# Patient Record
Sex: MALE | Race: WHITE | Employment: FULL TIME | URBAN - METROPOLITAN AREA
[De-identification: names, ages, dates, MRNs, and addresses within clinical notes are randomized per-mention and may not be internally consistent; named-entity substitution may affect disease eponyms.]

---

## 2017-03-14 PROBLEM — F43.20 ADJUSTMENT DISORDER: Status: ACTIVE | Noted: 2017-03-14

## 2017-04-08 ENCOUNTER — OFFICE VISIT (OUTPATIENT)
Dept: FAMILY MEDICINE CLINIC | Facility: CLINIC | Age: 57
End: 2017-04-08

## 2017-04-08 VITALS
BODY MASS INDEX: 27.27 KG/M2 | SYSTOLIC BLOOD PRESSURE: 138 MMHG | RESPIRATION RATE: 16 BRPM | HEIGHT: 71 IN | WEIGHT: 194.81 LBS | TEMPERATURE: 98 F | DIASTOLIC BLOOD PRESSURE: 60 MMHG | HEART RATE: 72 BPM

## 2017-04-08 DIAGNOSIS — J01.00 ACUTE MAXILLARY SINUSITIS, RECURRENCE NOT SPECIFIED: Primary | ICD-10-CM

## 2017-04-08 PROCEDURE — 99213 OFFICE O/P EST LOW 20 MIN: CPT | Performed by: PHYSICIAN ASSISTANT

## 2017-04-08 RX ORDER — IPRATROPIUM BROMIDE 21 UG/1
2 SPRAY, METERED NASAL EVERY 12 HOURS
Qty: 1 BOTTLE | Refills: 0 | Status: SHIPPED | OUTPATIENT
Start: 2017-04-08 | End: 2017-05-31

## 2017-04-08 RX ORDER — AMOXICILLIN AND CLAVULANATE POTASSIUM 875; 125 MG/1; MG/1
1 TABLET, FILM COATED ORAL 2 TIMES DAILY
Qty: 20 TABLET | Refills: 0 | Status: SHIPPED | OUTPATIENT
Start: 2017-04-08 | End: 2017-04-18

## 2017-04-08 NOTE — PATIENT INSTRUCTIONS
Thank you for choosing aMnsi Langston PA-C at [place] [address]  To Do: Paty Ahuja  1. Pt to begin medications as prescribed  2. OTC Allegra or Zyrtec  3.  If symptoms persist or increase pt to call office    • Please signup for MY CHART, which i make you healthier and to improve your quality of life.     Referrals, and Radiology Information:    If your insurance requires a referral to a specialist, please allow 5 business days to process your referral request.    If Camila Carr PA-C orders a

## 2017-04-08 NOTE — PROGRESS NOTES
[facility] Internal Medicine Progress Note    CC:  Patient presents with:  Sore Throat: x 3 days - has gotten worse  Sinus Problem  Headache      HPI:   HPI  Sore throat, sinus  Congestion  Sore throat  PND  A little cough  Denies any f/c/n/v/sob Cardiovascular: Negative for chest pain. Gastrointestinal: Negative for nausea and vomiting. Neurological: Negative for dizziness, light-headedness and headaches.          /60 mmHg  Pulse 72  Temp(Src) 98 °F (36.7 °C) (Temporal)  Resp 16  Ht 71\ barriers to learning. Medical education done. Outcome: Patient verbalizes understanding.     Problem List:  Patient Active Problem List:     BELLA on CPAP     GERD (gastroesophageal reflux disease)     Asthma     Nasal septal deviation     Depression     Empt

## 2017-04-27 ENCOUNTER — TELEPHONE (OUTPATIENT)
Dept: FAMILY MEDICINE CLINIC | Facility: CLINIC | Age: 57
End: 2017-04-27

## 2017-04-27 DIAGNOSIS — R05.9 COUGH: Primary | ICD-10-CM

## 2017-04-27 NOTE — TELEPHONE ENCOUNTER
Coughing, nasal congestion, has to constantly clear his throat, no fevers or chills, chest congestion, sore throat, and post nasal drip. Patient states Atrovent did not help. Would like to request a chest XR to rule out pneumonia.  Offered pt an appt, pt de

## 2017-04-28 ENCOUNTER — HOSPITAL ENCOUNTER (OUTPATIENT)
Dept: GENERAL RADIOLOGY | Age: 57
Discharge: HOME | End: 2017-04-28
Attending: INTERNAL MEDICINE
Payer: COMMERCIAL

## 2017-04-28 DIAGNOSIS — R05.9 COUGH: ICD-10-CM

## 2017-04-28 PROCEDURE — 71020 XR CHEST PA + LAT CHEST (CPT=71020): CPT

## 2017-04-28 RX ORDER — LEVOFLOXACIN 500 MG/1
500 TABLET, FILM COATED ORAL DAILY
Qty: 7 TABLET | Refills: 0 | Status: SHIPPED | OUTPATIENT
Start: 2017-04-28 | End: 2017-05-05

## 2017-05-30 RX ORDER — OMEPRAZOLE 20 MG/1
CAPSULE, DELAYED RELEASE ORAL
Qty: 90 CAPSULE | Refills: 3 | Status: SHIPPED | OUTPATIENT
Start: 2017-05-30 | End: 2018-05-25

## 2017-05-30 NOTE — TELEPHONE ENCOUNTER
Requesting Omeprazole 20mg  LOV: 4/8/17  RTC: prn  Last Labs: n/a  Filled: 4/19/16 #90 with 3 refills    Future Appointments  Date Time Provider Trace Carmen   5/31/2017 9:00 AM Pipo STEPHENS PA-C [facility] [address] [facility] [address]   6/13/2017 11:15 AM N[place]

## 2017-05-31 ENCOUNTER — OFFICE VISIT (OUTPATIENT)
Dept: FAMILY MEDICINE CLINIC | Facility: CLINIC | Age: 57
End: 2017-05-31

## 2017-05-31 VITALS
HEART RATE: 86 BPM | DIASTOLIC BLOOD PRESSURE: 76 MMHG | WEIGHT: 192 LBS | SYSTOLIC BLOOD PRESSURE: 130 MMHG | HEIGHT: 71 IN | RESPIRATION RATE: 16 BRPM | TEMPERATURE: 97 F | BODY MASS INDEX: 26.88 KG/M2

## 2017-05-31 DIAGNOSIS — B00.1 COLD SORE: Primary | ICD-10-CM

## 2017-05-31 DIAGNOSIS — H65.01 RIGHT ACUTE SEROUS OTITIS MEDIA, RECURRENCE NOT SPECIFIED: ICD-10-CM

## 2017-05-31 DIAGNOSIS — J45.20 MILD INTERMITTENT ASTHMA WITHOUT COMPLICATION: ICD-10-CM

## 2017-05-31 PROCEDURE — 99214 OFFICE O/P EST MOD 30 MIN: CPT | Performed by: PHYSICIAN ASSISTANT

## 2017-05-31 RX ORDER — VALACYCLOVIR HYDROCHLORIDE 500 MG/1
2000 TABLET, FILM COATED ORAL 2 TIMES DAILY
Qty: 8 TABLET | Refills: 0 | Status: SHIPPED | OUTPATIENT
Start: 2017-05-31 | End: 2017-11-10

## 2017-05-31 RX ORDER — IPRATROPIUM BROMIDE 21 UG/1
2 SPRAY, METERED NASAL EVERY 12 HOURS
Qty: 1 BOTTLE | Refills: 0 | Status: SHIPPED | OUTPATIENT
Start: 2017-05-31 | End: 2017-05-31

## 2017-05-31 RX ORDER — VALACYCLOVIR HYDROCHLORIDE 500 MG/1
2000 TABLET, FILM COATED ORAL 2 TIMES DAILY
Qty: 8 TABLET | Refills: 0 | Status: SHIPPED | OUTPATIENT
Start: 2017-05-31 | End: 2017-05-31

## 2017-05-31 RX ORDER — IPRATROPIUM BROMIDE 21 UG/1
2 SPRAY, METERED NASAL EVERY 12 HOURS
Qty: 1 BOTTLE | Refills: 0 | Status: SHIPPED | OUTPATIENT
Start: 2017-05-31 | End: 2019-01-28

## 2017-05-31 NOTE — PATIENT INSTRUCTIONS
Thank you for choosing Mansi Langston PA-C at [place] [address]  To Do: Paty Ahuja  1. Pt to begin medications as prescribed  2.  If symptoms persist or increase pt to call office    • Please signup for [facility] CHART, which is electronic access to you improve your quality of life.     Referrals, and Radiology Information:    If your insurance requires a referral to a specialist, please allow 5 business days to process your referral request.    If Mansi Langston PA-C orders a CT or MRI, it may take up

## 2017-05-31 NOTE — PROGRESS NOTES
[facility] Internal Medicine Progress Note    CC:  Patient presents with:  Derm Problem: cold sores  Ear Pain: R ear x 2 days - feels clogged      HPI:   HPI  Cold sore  Pt developed a cold sore about 3 days ago  Has been using OTC meds without r congestion, postnasal drip, rhinorrhea, sinus pressure and sore throat. Respiratory: Negative for cough, chest tightness, shortness of breath and wheezing. Cardiovascular: Negative for chest pain. Gastrointestinal: Negative for nausea and vomiting. Refills    ValACYclovir HCl 500 MG Oral Tab 8 tablet 0      Sig: Take 4 tablets (2,000 mg total) by mouth 2 (two) times daily.  X 1 day      Ipratropium Bromide (ATROVENT) 0.03 % Nasal Solution 1 Bottle 0      Si sprays by Nasal route every 12 (twelve)

## 2017-07-03 ENCOUNTER — PATIENT MESSAGE (OUTPATIENT)
Dept: FAMILY MEDICINE CLINIC | Facility: CLINIC | Age: 57
End: 2017-07-03

## 2017-07-03 DIAGNOSIS — Z12.5 ENCOUNTER FOR SCREENING FOR MALIGNANT NEOPLASM OF PROSTATE: Primary | ICD-10-CM

## 2017-07-03 DIAGNOSIS — IMO0001 UNCONTROLLED TYPE 2 DIABETES MELLITUS WITHOUT COMPLICATION, WITHOUT LONG-TERM CURRENT USE OF INSULIN: ICD-10-CM

## 2017-07-05 NOTE — TELEPHONE ENCOUNTER
Patient had general labwork in December. I see a psa was last collected in July 2016. I have pended that order. Do you feel you want any other labs?     Requesting Labs  LOV: 5/31/17  RTC: prn  Last Labs: 12/2016    Future Appointments  Date Time Provider

## 2017-07-05 NOTE — TELEPHONE ENCOUNTER
From: Kwan Jordan  To: Rogers Mccloud MD  Sent: 7/3/2017 11:15 AM CDT  Subject: Non-Urgent Medical Question    I will be going for blood work before July 18th for Dr. Ken Hidalgo and I was requesting for any needed blood work from Dr. Jluis England?   Kwan Jordan  (8

## 2017-07-17 ENCOUNTER — LAB ENCOUNTER (OUTPATIENT)
Dept: LAB | Age: 57
End: 2017-07-17
Attending: INTERNAL MEDICINE
Payer: COMMERCIAL

## 2017-07-17 DIAGNOSIS — E23.0 HYPOGONADOTROPIC HYPOGONADISM (HCC): ICD-10-CM

## 2017-07-17 DIAGNOSIS — E11.65 UNCONTROLLED TYPE 2 DIABETES MELLITUS WITH HYPERGLYCEMIA, WITHOUT LONG-TERM CURRENT USE OF INSULIN (HCC): ICD-10-CM

## 2017-07-17 DIAGNOSIS — E23.6 EMPTY SELLA SYNDROME (HCC): ICD-10-CM

## 2017-07-17 DIAGNOSIS — Z12.5 ENCOUNTER FOR SCREENING FOR MALIGNANT NEOPLASM OF PROSTATE: ICD-10-CM

## 2017-07-17 DIAGNOSIS — E78.2 MIXED HYPERLIPIDEMIA: ICD-10-CM

## 2017-07-17 DIAGNOSIS — IMO0001 UNCONTROLLED TYPE 2 DIABETES MELLITUS WITHOUT COMPLICATION, WITHOUT LONG-TERM CURRENT USE OF INSULIN: ICD-10-CM

## 2017-07-17 DIAGNOSIS — E03.9 ACQUIRED HYPOTHYROIDISM: ICD-10-CM

## 2017-07-17 LAB
COMPLEXED PSA SERPL-MCNC: 0.88 NG/ML (ref 0.01–4)
CREAT UR-SCNC: 74.7 MG/DL
EST. AVERAGE GLUCOSE BLD GHB EST-MCNC: 157 MG/DL (ref 68–126)
FREE T4: 0.8 NG/DL (ref 0.9–1.8)
HBA1C MFR BLD HPLC: 7.1 % (ref ?–5.7)
MICROALBUMIN UR-MCNC: <0.5 MG/DL

## 2017-07-17 PROCEDURE — 82043 UR ALBUMIN QUANTITATIVE: CPT

## 2017-07-17 PROCEDURE — 83036 HEMOGLOBIN GLYCOSYLATED A1C: CPT

## 2017-07-17 PROCEDURE — 84439 ASSAY OF FREE THYROXINE: CPT

## 2017-07-17 PROCEDURE — 82570 ASSAY OF URINE CREATININE: CPT

## 2017-07-17 PROCEDURE — 36415 COLL VENOUS BLD VENIPUNCTURE: CPT

## 2017-10-01 ENCOUNTER — MED REC SCAN ONLY (OUTPATIENT)
Dept: FAMILY MEDICINE CLINIC | Facility: CLINIC | Age: 57
End: 2017-10-01

## 2017-10-17 ENCOUNTER — IMMUNIZATION (OUTPATIENT)
Dept: FAMILY MEDICINE CLINIC | Facility: CLINIC | Age: 57
End: 2017-10-17

## 2017-10-17 DIAGNOSIS — Z23 NEED FOR VACCINATION: ICD-10-CM

## 2017-10-17 PROCEDURE — 90686 IIV4 VACC NO PRSV 0.5 ML IM: CPT | Performed by: INTERNAL MEDICINE

## 2017-10-17 PROCEDURE — 90471 IMMUNIZATION ADMIN: CPT | Performed by: INTERNAL MEDICINE

## 2017-11-10 ENCOUNTER — OFFICE VISIT (OUTPATIENT)
Dept: FAMILY MEDICINE CLINIC | Facility: CLINIC | Age: 57
End: 2017-11-10

## 2017-11-10 ENCOUNTER — TELEPHONE (OUTPATIENT)
Dept: FAMILY MEDICINE CLINIC | Facility: CLINIC | Age: 57
End: 2017-11-10

## 2017-11-10 ENCOUNTER — HOSPITAL ENCOUNTER (OUTPATIENT)
Dept: GENERAL RADIOLOGY | Age: 57
Discharge: HOME | End: 2017-11-10
Attending: FAMILY MEDICINE
Payer: COMMERCIAL

## 2017-11-10 VITALS
RESPIRATION RATE: 16 BRPM | WEIGHT: 199 LBS | HEART RATE: 78 BPM | DIASTOLIC BLOOD PRESSURE: 70 MMHG | SYSTOLIC BLOOD PRESSURE: 142 MMHG | OXYGEN SATURATION: 99 % | HEIGHT: 71 IN | BODY MASS INDEX: 27.86 KG/M2

## 2017-11-10 DIAGNOSIS — R06.02 SHORTNESS OF BREATH: Primary | ICD-10-CM

## 2017-11-10 DIAGNOSIS — R06.02 SHORTNESS OF BREATH: ICD-10-CM

## 2017-11-10 DIAGNOSIS — R03.0 ELEVATED BLOOD PRESSURE READING: ICD-10-CM

## 2017-11-10 PROCEDURE — 99214 OFFICE O/P EST MOD 30 MIN: CPT | Performed by: FAMILY MEDICINE

## 2017-11-10 PROCEDURE — 71020 XR CHEST PA + LAT CHEST (CPT=71020): CPT | Performed by: FAMILY MEDICINE

## 2017-11-10 NOTE — PROGRESS NOTES
HPI:   Jada Wong is a 65year old male that presents for SOB for 2 days. , exposed 2 days ago to ceiling debris at work. Very yvette, possible asbestos. Notes trouble taking deep breaths since then. No cough, wheezing.   Hx of asthma, us breath  - hx of asthma and possible exposure at work, will get CXR and start trial of flovent for SOB. Continue albuterol PRN. - CXR negative  - Fluticasone Propionate  MCG/ACT Inhalation Aerosol; Inhale 2 puffs into the lungs 2 (two) times daily.

## 2017-11-10 NOTE — PATIENT INSTRUCTIONS
Flovent 2 puffs twice a day for one month  Albuterol 2 puffs every 4-6 hours as needed  Chest xray today  Follow up if not improving or go to ER if shortness of breath worsens     Check blood pressure daily at home and bring log to next visit.   Goal <140/9 on the amount of caffeine and stimulants you consume. Follow-up care  Follow up with your healthcare provider, or as advised. If tests were done, you will be told if your treatment needs to be changed. You can call as directed for the results.   If an X-r

## 2017-11-10 NOTE — TELEPHONE ENCOUNTER
Patint states he was exposed to debris from the ceiling at work on 11/7/17, 11/8/17 and 11/9/17. Patient states after the exposure on 11/8/17 he started to experience heaviness in his lungs.  He states he has hx of asthma but this symptom feels much differe

## 2017-11-10 NOTE — TELEPHONE ENCOUNTER
Dr. Wilber Hermosillo- Pt working with dust at work and has Asthma. Pt would like to discuss the dust and cement dust that he has breathed in. Asthma is acting up. Please call. We can offer an appointment next week.   Pt wants to make sure he can wait until next week

## 2017-11-10 NOTE — TELEPHONE ENCOUNTER
Urgent messaged  Paged from patient  He has asbestos exposure at work and c/o difficul breathing  And asthma  He wants us to call    Please call him and ask him how bad is it, i'm sure he can see one of us today

## 2017-11-13 RX ORDER — ESCITALOPRAM OXALATE 10 MG/1
TABLET ORAL
Qty: 90 TABLET | Refills: 1 | Status: SHIPPED | OUTPATIENT
Start: 2017-11-13 | End: 2018-05-12

## 2017-11-13 NOTE — TELEPHONE ENCOUNTER
Requesting Escitalopram  LOV: 5/31/17  RTC: prn  Last Relevant Labs: n/a  Filled: 11/16/16 #90 with 3 refills  Time started: 109    Time ended: 113    Total time spent on chart: 4 min      Future Appointments  Date Time Provider Trace Carmen   11/27/2

## 2017-11-27 ENCOUNTER — OFFICE VISIT (OUTPATIENT)
Dept: FAMILY MEDICINE CLINIC | Facility: CLINIC | Age: 57
End: 2017-11-27

## 2017-11-27 VITALS
BODY MASS INDEX: 27.58 KG/M2 | SYSTOLIC BLOOD PRESSURE: 140 MMHG | HEART RATE: 70 BPM | HEIGHT: 71 IN | RESPIRATION RATE: 16 BRPM | WEIGHT: 197 LBS | TEMPERATURE: 99 F | DIASTOLIC BLOOD PRESSURE: 74 MMHG

## 2017-11-27 DIAGNOSIS — I10 BENIGN ESSENTIAL HTN: ICD-10-CM

## 2017-11-27 DIAGNOSIS — T46.6X5A STATIN MYOPATHY: ICD-10-CM

## 2017-11-27 DIAGNOSIS — G43.709 CHRONIC MIGRAINE WITHOUT AURA WITHOUT STATUS MIGRAINOSUS, NOT INTRACTABLE: ICD-10-CM

## 2017-11-27 DIAGNOSIS — E78.5 DYSLIPIDEMIA: ICD-10-CM

## 2017-11-27 DIAGNOSIS — E11.65 UNCONTROLLED TYPE 2 DIABETES MELLITUS WITH HYPERGLYCEMIA, WITHOUT LONG-TERM CURRENT USE OF INSULIN (HCC): Primary | ICD-10-CM

## 2017-11-27 DIAGNOSIS — G72.0 STATIN MYOPATHY: ICD-10-CM

## 2017-11-27 PROCEDURE — 99214 OFFICE O/P EST MOD 30 MIN: CPT | Performed by: INTERNAL MEDICINE

## 2017-11-27 RX ORDER — LISINOPRIL 10 MG/1
10 TABLET ORAL DAILY
Qty: 30 TABLET | Refills: 3 | Status: SHIPPED | OUTPATIENT
Start: 2017-11-27 | End: 2018-01-22

## 2017-11-27 NOTE — PATIENT INSTRUCTIONS
Thank you for choosing Alan Crawley MD at [place] [address]  To Do: Paty Ahuaj  1.  Stop simvastatin for 2 weeks and see if your body aches go away it means your simvastatin is causing aches- and then call us back we can change it to something else exercise may trigger headache    Drink Plenty of Water • A normal adult should drink plenty of water throughout the day   • Dehydration may cause headaches    Limit Caffeine, Alcohol and other Drugs • Caffeine is a stimulant and caffeine withdrawal may cau lab is closed daily from 12-12:30  Saturday lab hours would be based upon their providers schedules at that office. Patients should call the lab for appointments and hours details.    The Lab phone is the same: [phone]  • Please signup for MY CHART, w to a specialist, please allow 5 business days to process your referral request.    If Kianna Spears MD orders a CT or MRI, it may take up to 10 business days to receive approval from your insurance company.  Once our office has called informing you that the

## 2017-11-27 NOTE — PROGRESS NOTES
[facility] Internal Medicine Office Note  Chief Complaint:   Patient presents with:  Headache  Muscle Pain  HTN  Cholesterol  Diabetes      HPI:   This is a 65year old male coming in for  HPI  Pt with dm and uncontrolled htn  Seeing dorita  C/o bran Disp: 1 kit Rfl: 0   VENTOLIN  (90 BASE) MCG/ACT Inhalation Aero Soln INHALE 1 PUFF INTO THE LUNGS EVERY 6 HOURS AS NEEDED FOR WHEEZING Disp: 18 g Rfl: 0   SIMVASTATIN 40 MG Oral Tab TAKE ONE AND ONE-HALF TABLETS DAILY Disp: 135 tablet Rfl: 1   Aspi has no cervical adenopathy. Neurological: He is alert and oriented to person, place, and time. He displays normal reflexes. No cranial nerve deficit. Coordination and gait normal.   Skin: Skin is warm. No lesion and no rash noted. No erythema.    Psychiat verbalizes understanding. Patient is notified to call with any questions, complications, allergies, or worsening or changing symptoms. Patient is to call with any side effects or complications from the treatments as a result of today.    Patient Active Prob

## 2017-12-13 ENCOUNTER — TELEPHONE (OUTPATIENT)
Dept: FAMILY MEDICINE CLINIC | Facility: CLINIC | Age: 57
End: 2017-12-13

## 2017-12-13 DIAGNOSIS — G47.30 SLEEP APNEA, UNSPECIFIED TYPE: Primary | ICD-10-CM

## 2017-12-13 NOTE — TELEPHONE ENCOUNTER
Contacted pt and asked where he is going to obtain the mouth piece from. Per pt he will be going to specialized dental location that creates dental impressions and than makes the mouth piece, pt states he will call with information.  Will await information

## 2017-12-13 NOTE — TELEPHONE ENCOUNTER
Sleep study and office note from 4/20/15 faxed to [facility] @ [phone], ATTN: Soraida Osman.

## 2018-01-22 ENCOUNTER — OFFICE VISIT (OUTPATIENT)
Dept: FAMILY MEDICINE CLINIC | Facility: CLINIC | Age: 58
End: 2018-01-22

## 2018-01-22 VITALS
DIASTOLIC BLOOD PRESSURE: 72 MMHG | HEIGHT: 70 IN | OXYGEN SATURATION: 96 % | WEIGHT: 196 LBS | RESPIRATION RATE: 15 BRPM | HEART RATE: 103 BPM | BODY MASS INDEX: 28.06 KG/M2 | TEMPERATURE: 98 F | SYSTOLIC BLOOD PRESSURE: 138 MMHG

## 2018-01-22 DIAGNOSIS — G47.33 OSA ON CPAP: ICD-10-CM

## 2018-01-22 DIAGNOSIS — E11.65 UNCONTROLLED TYPE 2 DIABETES MELLITUS WITH HYPERGLYCEMIA, WITHOUT LONG-TERM CURRENT USE OF INSULIN (HCC): Primary | ICD-10-CM

## 2018-01-22 DIAGNOSIS — Z99.89 OSA ON CPAP: ICD-10-CM

## 2018-01-22 DIAGNOSIS — Z13.21 SCREENING FOR ENDOCRINE, NUTRITIONAL, METABOLIC AND IMMUNITY DISORDER: ICD-10-CM

## 2018-01-22 DIAGNOSIS — Z13.29 SCREENING FOR ENDOCRINE, NUTRITIONAL, METABOLIC AND IMMUNITY DISORDER: ICD-10-CM

## 2018-01-22 DIAGNOSIS — Z13.0 SCREENING FOR ENDOCRINE, NUTRITIONAL, METABOLIC AND IMMUNITY DISORDER: ICD-10-CM

## 2018-01-22 DIAGNOSIS — Z13.21 ENCOUNTER FOR VITAMIN DEFICIENCY SCREENING: ICD-10-CM

## 2018-01-22 DIAGNOSIS — Z13.228 SCREENING FOR ENDOCRINE, NUTRITIONAL, METABOLIC AND IMMUNITY DISORDER: ICD-10-CM

## 2018-01-22 DIAGNOSIS — I10 ESSENTIAL HYPERTENSION: ICD-10-CM

## 2018-01-22 DIAGNOSIS — E78.2 MIXED HYPERLIPIDEMIA: ICD-10-CM

## 2018-01-22 PROCEDURE — 99396 PREV VISIT EST AGE 40-64: CPT | Performed by: FAMILY MEDICINE

## 2018-01-22 RX ORDER — LOSARTAN POTASSIUM 50 MG/1
50 TABLET ORAL DAILY
Qty: 30 TABLET | Refills: 1 | Status: SHIPPED | OUTPATIENT
Start: 2018-01-22 | End: 2018-03-07

## 2018-01-22 NOTE — PROGRESS NOTES
Patient presents with:  Physical: NP, physical       Joseeta Cabot is a 65year old year old who presents for recheck of diabetes. Pt has been checking feet on a regular basis. Pt denies any tingling of the feet. Pt denies any sx's of depression.   Pt co Retinopathy - - No - - - No -   Comments - - - - - - - -   Last Depression Screening Date - - - - - - - -   How was patient screened? - - - - - - - -   PHQ2 Date - - 1/15/2018 - - - - -   PHQ2 Score - - 0 - - - - -   PHQ4 Score - - - - - - - -       Boby Cholesterol (mg/dL)   Date Value   12/17/2016 67   07/08/2016 131 (H)   04/08/2016 78   ----------  AST (U/L)   Date Value   12/17/2016 13 (L)   07/08/2016 20   09/14/2015 15   07/08/2014 24   01/13/2014 28   01/16/2013 18   06/22/2011 14 (L)   ---------- Problem List:     BELLA on CPAP     GERD (gastroesophageal reflux disease)     Asthma     Nasal septal deviation     Depression     Empty sella (HCC)     Diverticula of colon     Internal hemorrhoids     Schatzki's ring     Allergic rhinitis     Gastritis Carotid stenosis 7/15/2016   • Depression 6/20/11   • Diabetes mellitus, type 2 ([facility] [address]    • Diastolic dysfunction 2/97/9084   • Diverticula of colon 9/26/11    of sigmoid colon   • Empty sella ([facility] 7/1/2011    a 1.2 cm mass is identified within th pituitar no JVD present. No edema present. No mass and no thyromegaly present. Cardiovascular: Normal rate, regular rhythm and intact distal pulses. No murmur, rubs or gallops. Pulmonary/Chest: Effort normal and breath sounds normal. No respiratory distress.  Allen Sigala Chronic Complications, Behavior Change Strategies, Risk Reduction Strategies   Reducing Risk: Daily foot checks, BP Control, Lipid Control, Dilated eye exam, Skin/dental care, Urine for microalbumin, Weight Control  Medication: Take at appropriate times, T

## 2018-01-24 ENCOUNTER — LAB ENCOUNTER (OUTPATIENT)
Dept: LAB | Age: 58
End: 2018-01-24
Attending: FAMILY MEDICINE
Payer: COMMERCIAL

## 2018-01-24 DIAGNOSIS — Z13.21 SCREENING FOR ENDOCRINE, NUTRITIONAL, METABOLIC AND IMMUNITY DISORDER: ICD-10-CM

## 2018-01-24 DIAGNOSIS — E11.65 UNCONTROLLED TYPE 2 DIABETES MELLITUS WITH HYPERGLYCEMIA, WITHOUT LONG-TERM CURRENT USE OF INSULIN (HCC): ICD-10-CM

## 2018-01-24 DIAGNOSIS — E23.0 HYPOGONADOTROPIC HYPOGONADISM (HCC): ICD-10-CM

## 2018-01-24 DIAGNOSIS — Z13.29 SCREENING FOR ENDOCRINE, NUTRITIONAL, METABOLIC AND IMMUNITY DISORDER: ICD-10-CM

## 2018-01-24 DIAGNOSIS — E23.6 EMPTY SELLA (HCC): ICD-10-CM

## 2018-01-24 DIAGNOSIS — Z13.228 SCREENING FOR ENDOCRINE, NUTRITIONAL, METABOLIC AND IMMUNITY DISORDER: ICD-10-CM

## 2018-01-24 DIAGNOSIS — E78.2 MIXED HYPERLIPIDEMIA: ICD-10-CM

## 2018-01-24 DIAGNOSIS — Z13.0 SCREENING FOR ENDOCRINE, NUTRITIONAL, METABOLIC AND IMMUNITY DISORDER: ICD-10-CM

## 2018-01-24 DIAGNOSIS — E03.9 ACQUIRED HYPOTHYROIDISM: ICD-10-CM

## 2018-01-24 DIAGNOSIS — Z13.21 ENCOUNTER FOR VITAMIN DEFICIENCY SCREENING: ICD-10-CM

## 2018-01-24 LAB
25-HYDROXYVITAMIN D (TOTAL): 18.5 NG/ML (ref 30–100)
ALBUMIN SERPL-MCNC: 4.3 G/DL (ref 3.5–4.8)
ALP LIVER SERPL-CCNC: 81 U/L (ref 45–117)
ALT SERPL-CCNC: 30 U/L (ref 17–63)
AST SERPL-CCNC: 17 U/L (ref 15–41)
BASOPHILS # BLD AUTO: 0.03 X10(3) UL (ref 0–0.1)
BASOPHILS NFR BLD AUTO: 0.6 %
BILIRUB SERPL-MCNC: 0.8 MG/DL (ref 0.1–2)
BUN BLD-MCNC: 14 MG/DL (ref 8–20)
CALCIUM BLD-MCNC: 9.1 MG/DL (ref 8.3–10.3)
CHLORIDE: 102 MMOL/L (ref 101–111)
CHOLEST SMN-MCNC: 161 MG/DL (ref ?–200)
CO2: 29 MMOL/L (ref 22–32)
CREAT BLD-MCNC: 0.89 MG/DL (ref 0.7–1.3)
CREAT UR-SCNC: 108 MG/DL
EOSINOPHIL # BLD AUTO: 0.12 X10(3) UL (ref 0–0.3)
EOSINOPHIL NFR BLD AUTO: 2.5 %
ERYTHROCYTE [DISTWIDTH] IN BLOOD BY AUTOMATED COUNT: 13.7 % (ref 11.5–16)
EST. AVERAGE GLUCOSE BLD GHB EST-MCNC: 174 MG/DL (ref 68–126)
FREE T4: 0.7 NG/DL (ref 0.9–1.8)
GLUCOSE BLD-MCNC: 131 MG/DL (ref 70–99)
HBA1C MFR BLD HPLC: 7.7 % (ref ?–5.7)
HCT VFR BLD AUTO: 46.3 % (ref 37–53)
HDLC SERPL-MCNC: 51 MG/DL (ref 45–?)
HDLC SERPL: 3.16 {RATIO} (ref ?–4.97)
HGB BLD-MCNC: 15.2 G/DL (ref 13–17)
IMMATURE GRANULOCYTE COUNT: 0.04 X10(3) UL (ref 0–1)
IMMATURE GRANULOCYTE RATIO %: 0.8 %
LDLC SERPL CALC-MCNC: 89 MG/DL (ref ?–130)
LYMPHOCYTES # BLD AUTO: 1.37 X10(3) UL (ref 0.9–4)
LYMPHOCYTES NFR BLD AUTO: 28.3 %
M PROTEIN MFR SERPL ELPH: 7.7 G/DL (ref 6.1–8.3)
MCH RBC QN AUTO: 27.9 PG (ref 27–33.2)
MCHC RBC AUTO-ENTMCNC: 32.8 G/DL (ref 31–37)
MCV RBC AUTO: 85.1 FL (ref 80–99)
MICROALBUMIN UR-MCNC: 0.77 MG/DL
MICROALBUMIN/CREAT 24H UR-RTO: 7.1 UG/MG (ref ?–30)
MONOCYTES # BLD AUTO: 0.35 X10(3) UL (ref 0.1–0.6)
MONOCYTES NFR BLD AUTO: 7.2 %
NEUTROPHIL ABS PRELIM: 2.93 X10 (3) UL (ref 1.3–6.7)
NEUTROPHILS # BLD AUTO: 2.93 X10(3) UL (ref 1.3–6.7)
NEUTROPHILS NFR BLD AUTO: 60.6 %
NONHDLC SERPL-MCNC: 110 MG/DL (ref ?–130)
PLATELET # BLD AUTO: 179 10(3)UL (ref 150–450)
POTASSIUM SERPL-SCNC: 4.2 MMOL/L (ref 3.6–5.1)
RBC # BLD AUTO: 5.44 X10(6)UL (ref 4.3–5.7)
RED CELL DISTRIBUTION WIDTH-SD: 42.2 FL (ref 35.1–46.3)
SODIUM SERPL-SCNC: 138 MMOL/L (ref 136–144)
TRIGL SERPL-MCNC: 103 MG/DL (ref ?–150)
TSI SER-ACNC: 2.52 MIU/ML (ref 0.35–5.5)
VLDLC SERPL CALC-MCNC: 21 MG/DL (ref 5–40)
WBC # BLD AUTO: 4.8 X10(3) UL (ref 4–13)

## 2018-01-24 PROCEDURE — 80061 LIPID PANEL: CPT | Performed by: FAMILY MEDICINE

## 2018-01-24 PROCEDURE — 82306 VITAMIN D 25 HYDROXY: CPT | Performed by: FAMILY MEDICINE

## 2018-01-24 PROCEDURE — 36415 COLL VENOUS BLD VENIPUNCTURE: CPT | Performed by: FAMILY MEDICINE

## 2018-01-24 PROCEDURE — 85025 COMPLETE CBC W/AUTO DIFF WBC: CPT | Performed by: FAMILY MEDICINE

## 2018-02-06 ENCOUNTER — TELEPHONE (OUTPATIENT)
Dept: FAMILY MEDICINE CLINIC | Facility: CLINIC | Age: 58
End: 2018-02-06

## 2018-02-06 NOTE — TELEPHONE ENCOUNTER
Please see below message. Patient is requesting to try an alternative cholesterol medication d/t muscle aches from simvastatin. Please advise. Thank you!

## 2018-02-06 NOTE — TELEPHONE ENCOUNTER
Patient stopped taking cholesaterol medication and the body aches that he was having went away which this is what Dr. Amol Posadas told him to do. He would like to try a different medication.  Patient had been taking SIMVASTATIN 40 MG Oral Tab however he is only

## 2018-02-07 RX ORDER — ATORVASTATIN CALCIUM 40 MG/1
40 TABLET, FILM COATED ORAL NIGHTLY
Qty: 90 TABLET | Refills: 1 | Status: SHIPPED | OUTPATIENT
Start: 2018-02-07 | End: 2018-06-12

## 2018-02-07 NOTE — TELEPHONE ENCOUNTER
Called patient and spoke with him. Went over [address] below. Patient states understanding. Rx sent to pharmacy for patient as requested.

## 2018-03-07 DIAGNOSIS — I10 ESSENTIAL HYPERTENSION: ICD-10-CM

## 2018-03-07 RX ORDER — LOSARTAN POTASSIUM 50 MG/1
TABLET ORAL
Qty: 30 TABLET | Refills: 0 | Status: SHIPPED | OUTPATIENT
Start: 2018-03-07 | End: 2018-04-06

## 2018-04-06 DIAGNOSIS — I10 ESSENTIAL HYPERTENSION: ICD-10-CM

## 2018-04-06 RX ORDER — LOSARTAN POTASSIUM 50 MG/1
TABLET ORAL
Qty: 30 TABLET | Refills: 0 | Status: SHIPPED | OUTPATIENT
Start: 2018-04-06 | End: 2018-05-10

## 2018-04-17 ENCOUNTER — TELEPHONE (OUTPATIENT)
Dept: FAMILY MEDICINE CLINIC | Facility: CLINIC | Age: 58
End: 2018-04-17

## 2018-04-17 ENCOUNTER — OFFICE VISIT (OUTPATIENT)
Dept: FAMILY MEDICINE CLINIC | Facility: CLINIC | Age: 58
End: 2018-04-17

## 2018-04-17 VITALS
DIASTOLIC BLOOD PRESSURE: 62 MMHG | HEIGHT: 71 IN | WEIGHT: 200.38 LBS | SYSTOLIC BLOOD PRESSURE: 112 MMHG | BODY MASS INDEX: 28.05 KG/M2 | HEART RATE: 84 BPM | RESPIRATION RATE: 16 BRPM | TEMPERATURE: 98 F

## 2018-04-17 DIAGNOSIS — M54.50 ACUTE MIDLINE LOW BACK PAIN WITHOUT SCIATICA: Primary | ICD-10-CM

## 2018-04-17 DIAGNOSIS — M62.838 MUSCLE SPASM: ICD-10-CM

## 2018-04-17 PROCEDURE — 99213 OFFICE O/P EST LOW 20 MIN: CPT | Performed by: FAMILY MEDICINE

## 2018-04-17 RX ORDER — CYCLOBENZAPRINE HCL 10 MG
10 TABLET ORAL NIGHTLY PRN
Qty: 30 TABLET | Refills: 0 | Status: SHIPPED | OUTPATIENT
Start: 2018-04-17 | End: 2018-06-04 | Stop reason: ALTCHOICE

## 2018-04-17 NOTE — PATIENT INSTRUCTIONS
Alternate Tylenol and ibuprofen for pain and inflammation  Can add cyclobenzaprine muscle relaxer at night for up to 3 times per day. This medication can be sedating and do not combine with alcohol.   Follow-up if not improving    Self-Care for Low Back · You have a sharp, stabbing pain. · Your pain is constant. · You have pain or numbness in your leg. · You feel pain in a new area of your back. · You notice that the pain isn’t decreasing after more than a week.    Date Last Reviewed: 9/29/2015 © 2000

## 2018-04-17 NOTE — TELEPHONE ENCOUNTER
Patient scheduled an appointment and has since been seen by a different [facility] office. PCP has been changed. My Chart message sent to patient.

## 2018-04-17 NOTE — TELEPHONE ENCOUNTER
Patient called the office during the lunch hour and left a message. He was at the vet and lifting his dog onto the exam table and pulled a muscle in his back. He is now not able to move.  He has never had back issues in the past. He is requesting we send a

## 2018-04-17 NOTE — PROGRESS NOTES
HPI:   Kassie eJnkins is a 63year old male that presents for low back pain. He lifted his 80 pound dog up onto the Vet table 3 days ago and since then has had midline dull aching low back pain. Pain is 6 out of 10 at worst and does not radiate.   Ibupro DAILY, Disp: 90 tablet, Rfl: 1  •  Ipratropium Bromide (ATROVENT) 0.03 % Nasal Solution, 2 sprays by Nasal route every 12 (twelve) hours. , Disp: 1 Bottle, Rfl: 0  •  OMEPRAZOLE 20 MG Oral Capsule Delayed Release, TAKE 1 CAPSULE DAILY, Disp: 90 capsule, Rfl repair. Strength in right lower extremity normal.      ASSESSMENT AND PLAN:      1. Acute midline low back pain without sciatica, Muscle spasm  -Alternate Tylenol and ibuprofen for pain and inflammation.   Add muscle relaxer at night.  -Heating pad as need

## 2018-05-10 DIAGNOSIS — I10 ESSENTIAL HYPERTENSION: ICD-10-CM

## 2018-05-10 RX ORDER — LOSARTAN POTASSIUM 50 MG/1
TABLET ORAL
Qty: 30 TABLET | Refills: 0 | Status: SHIPPED | OUTPATIENT
Start: 2018-05-10 | End: 2018-06-12

## 2018-05-14 RX ORDER — ESCITALOPRAM OXALATE 10 MG/1
TABLET ORAL
Qty: 90 TABLET | Refills: 0 | Status: SHIPPED | OUTPATIENT
Start: 2018-05-14 | End: 2018-08-12

## 2018-05-14 NOTE — TELEPHONE ENCOUNTER
Requesting Escitalopram   LOV: 4/17/18  RTC: none   Last Relevant Labs: n/a   Filled: 11/13/17 #90 with 1 refills    Future Appointments  Date Time Provider Trace Carmen   7/16/2018 9:00 AM MD JOANNE Potter [facility]       Pended if okay

## 2018-05-14 NOTE — TELEPHONE ENCOUNTER
Attempted to reach pt no answer, left detailed message informing pt that he is due for his annual physical. Left office information to contact the office and schedule OV

## 2018-05-25 RX ORDER — OMEPRAZOLE 20 MG/1
CAPSULE, DELAYED RELEASE ORAL
Qty: 90 CAPSULE | Refills: 3 | Status: SHIPPED | OUTPATIENT
Start: 2018-05-25 | End: 2019-05-20

## 2018-05-25 NOTE — TELEPHONE ENCOUNTER
Requesting: Omeprazole 20mg  LOV: 4/17/18 - acute  RTC: if no improvement  Last Relevant Labs: 1/24/18 - from Dr. Chung Littlefield: 5/30/17 #90 with 3 refills    Future Appointments  Date Time Provider Trace Caremn   6/4/2018 9:00 AM Tao Moore

## 2018-06-04 ENCOUNTER — OFFICE VISIT (OUTPATIENT)
Dept: FAMILY MEDICINE CLINIC | Facility: CLINIC | Age: 58
End: 2018-06-04

## 2018-06-04 VITALS
RESPIRATION RATE: 16 BRPM | DIASTOLIC BLOOD PRESSURE: 70 MMHG | SYSTOLIC BLOOD PRESSURE: 120 MMHG | WEIGHT: 194.25 LBS | HEIGHT: 71 IN | HEART RATE: 72 BPM | BODY MASS INDEX: 27.19 KG/M2 | TEMPERATURE: 97 F

## 2018-06-04 DIAGNOSIS — E23.6 EMPTY SELLA SYNDROME (HCC): ICD-10-CM

## 2018-06-04 DIAGNOSIS — E78.2 MIXED HYPERLIPIDEMIA: ICD-10-CM

## 2018-06-04 DIAGNOSIS — F32.A DEPRESSION, UNSPECIFIED DEPRESSION TYPE: ICD-10-CM

## 2018-06-04 DIAGNOSIS — E03.8 SUBCLINICAL HYPOTHYROIDISM: ICD-10-CM

## 2018-06-04 DIAGNOSIS — E11.65 UNCONTROLLED TYPE 2 DIABETES MELLITUS WITH HYPERGLYCEMIA, WITHOUT LONG-TERM CURRENT USE OF INSULIN (HCC): Primary | ICD-10-CM

## 2018-06-04 DIAGNOSIS — K21.9 GASTROESOPHAGEAL REFLUX DISEASE WITHOUT ESOPHAGITIS: ICD-10-CM

## 2018-06-04 DIAGNOSIS — J45.20 MILD INTERMITTENT ASTHMA WITHOUT COMPLICATION: ICD-10-CM

## 2018-06-04 DIAGNOSIS — Z99.89 OSA ON CPAP: ICD-10-CM

## 2018-06-04 DIAGNOSIS — G47.33 OSA ON CPAP: ICD-10-CM

## 2018-06-04 PROCEDURE — 99214 OFFICE O/P EST MOD 30 MIN: CPT | Performed by: FAMILY MEDICINE

## 2018-06-04 RX ORDER — CANAGLIFLOZIN 100 MG/1
1 TABLET, FILM COATED ORAL DAILY
COMMUNITY
Start: 2018-04-25 | End: 2018-07-24

## 2018-06-04 NOTE — PATIENT INSTRUCTIONS
Pneumonia 23 and Shingles vaccine available at pharmacy. Please have them send a record if you receive them. Follow up in 6 months or sooner if needed.        Prevention Guidelines, Men Ages 48 to 59  Screening tests and vaccines are an important part of Obesity All men in this age group At routine exams   Prostate cancer Starting at age 39, talk to healthcare provider about risks and benefits of digital rectal exam (CAL) and prostate-specific antigen (PSA) screening1 At routine exams   Syphilis Men at inc PPSV23: 1 to 2 doses through age 59, or 1 dose at 72 or older (protects against 23 types of pneumococcal bacteria)      Tetanus/diphtheria/  pertussis (Td/Tdap) booster All men in this age group Td every 10 years, or a one-time dose of Tdap instead of a Td

## 2018-06-04 NOTE — PROGRESS NOTES
Jeff Saldana is a 63year old male that presents for follow up. History of BELLA, wears CPAP regularly. Asthma is well controlled with albuterol inhaler use 1-2 times per year, usually triggered by allergies.   Symptoms have been significantly reduc (ACCU-CHEK TERE CONNECT) W/DEVICE Does not apply Kit, 1 each by Does not apply route 4 (four) times daily. , Disp: 1 kit, Rfl: 0  •  VENTOLIN  (90 BASE) MCG/ACT Inhalation Aero Soln, INHALE 1 PUFF INTO THE LUNGS EVERY 6 HOURS AS NEEDED FOR WHEEZING, amelia's ring     Family History   Problem Relation Age of Onset   • Other (colon cancer) Other    • Diabetes Mother    • Other (COPD) Mother    • Prostate Cancer Neg      Social History Narrative  He is  and works at [employer]. He is .   He AND PLAN:   63year old year old male who presents for a complete physical exam.     1. Uncontrolled type 2 diabetes mellitus with hyperglycemia, without long-term current use of insulin (HCC)  A1c trending up.   Last A1c 7.7, repeat in 3 months  Patient on

## 2018-06-11 ENCOUNTER — TELEPHONE (OUTPATIENT)
Dept: FAMILY MEDICINE CLINIC | Facility: CLINIC | Age: 58
End: 2018-06-11

## 2018-06-11 DIAGNOSIS — I10 ESSENTIAL HYPERTENSION: ICD-10-CM

## 2018-06-11 NOTE — TELEPHONE ENCOUNTER
Pt states Dr. David Constantino is going to call in Two rx's for pt.    atorvastatin (LIPITOR) 40 MG Oral Tab 90 tablet 1 2/7/2018     Sig - Route: Take 1 tablet (40 mg total) by mouth nightly.  - Oral    E-Prescribing Status: Receipt confirmed by pharmacy (2/7/2018

## 2018-06-11 NOTE — TELEPHONE ENCOUNTER
S/W patient he called 's office for medication refill. He will be seeing . Awake/Alert and oriented to person, place and time

## 2018-06-12 ENCOUNTER — TELEPHONE (OUTPATIENT)
Dept: FAMILY MEDICINE CLINIC | Facility: CLINIC | Age: 58
End: 2018-06-12

## 2018-06-12 DIAGNOSIS — I10 ESSENTIAL HYPERTENSION: ICD-10-CM

## 2018-06-12 RX ORDER — ATORVASTATIN CALCIUM 40 MG/1
40 TABLET, FILM COATED ORAL NIGHTLY
Qty: 90 TABLET | Refills: 1 | Status: SHIPPED | OUTPATIENT
Start: 2018-06-12 | End: 2018-11-22

## 2018-06-12 RX ORDER — LOSARTAN POTASSIUM 50 MG/1
50 TABLET ORAL
Qty: 30 TABLET | Refills: 0 | OUTPATIENT
Start: 2018-06-12

## 2018-06-12 RX ORDER — ATORVASTATIN CALCIUM 40 MG/1
40 TABLET, FILM COATED ORAL NIGHTLY
Qty: 90 TABLET | Refills: 1 | OUTPATIENT
Start: 2018-06-12

## 2018-06-12 RX ORDER — LOSARTAN POTASSIUM 50 MG/1
50 TABLET ORAL
Qty: 90 TABLET | Refills: 1 | Status: SHIPPED | OUTPATIENT
Start: 2018-06-12 | End: 2018-11-22

## 2018-06-12 NOTE — TELEPHONE ENCOUNTER
Patient was seen last week by Dr. Kenji Noland. He will be continuing to see her as his PCP. He will needs refills on Atorvastatin and Losartan sent to the [facility].

## 2018-06-12 NOTE — TELEPHONE ENCOUNTER
Requesting Atorvastatin and Losartan  LOV: 6/4/18  RTC: 6 months  Last Relevant Labs: 1/24/18  Filled: 2/7/18 #90 with 1 refills  Atorvastatin  Filled: 5/10/18 #30 with 0 refills  Losartan    Future Appointments  Date Time Provider Trace Carmen   7/16

## 2018-06-12 NOTE — TELEPHONE ENCOUNTER
Future Appointments  Date Time Provider Trace Nella   7/16/2018 9:00 AM MD JOANEN Velazco [facility]   12/3/2018 10:00 AM Maria Luisa Martin DO [facility] [address] [facility] [address]     Patient has been receiving refills / appointments from [facility] [id number] a

## 2018-06-13 ENCOUNTER — APPOINTMENT (OUTPATIENT)
Dept: LAB | Age: 58
End: 2018-06-13
Attending: INTERNAL MEDICINE
Payer: COMMERCIAL

## 2018-06-13 DIAGNOSIS — E23.6 EMPTY SELLA SYNDROME (HCC): ICD-10-CM

## 2018-06-13 DIAGNOSIS — E23.0 HYPOGONADOTROPIC HYPOGONADISM (HCC): ICD-10-CM

## 2018-06-13 DIAGNOSIS — E03.9 ACQUIRED HYPOTHYROIDISM: ICD-10-CM

## 2018-06-13 DIAGNOSIS — I10 ESSENTIAL HYPERTENSION: ICD-10-CM

## 2018-06-13 DIAGNOSIS — E11.65 UNCONTROLLED TYPE 2 DIABETES MELLITUS WITH HYPERGLYCEMIA, WITHOUT LONG-TERM CURRENT USE OF INSULIN (HCC): ICD-10-CM

## 2018-06-13 DIAGNOSIS — E78.2 MIXED HYPERLIPIDEMIA: ICD-10-CM

## 2018-06-13 PROCEDURE — 82043 UR ALBUMIN QUANTITATIVE: CPT

## 2018-06-13 PROCEDURE — 80061 LIPID PANEL: CPT

## 2018-06-13 PROCEDURE — 83036 HEMOGLOBIN GLYCOSYLATED A1C: CPT

## 2018-06-13 PROCEDURE — 80053 COMPREHEN METABOLIC PANEL: CPT

## 2018-06-13 PROCEDURE — 84443 ASSAY THYROID STIM HORMONE: CPT

## 2018-06-13 PROCEDURE — 82570 ASSAY OF URINE CREATININE: CPT

## 2018-06-13 PROCEDURE — 36415 COLL VENOUS BLD VENIPUNCTURE: CPT

## 2018-06-13 PROCEDURE — 82306 VITAMIN D 25 HYDROXY: CPT

## 2018-06-13 PROCEDURE — 84439 ASSAY OF FREE THYROXINE: CPT

## 2018-08-13 RX ORDER — ESCITALOPRAM OXALATE 10 MG/1
TABLET ORAL
Qty: 90 TABLET | Refills: 0 | Status: SHIPPED | OUTPATIENT
Start: 2018-08-13 | End: 2018-11-10

## 2018-08-13 NOTE — TELEPHONE ENCOUNTER
Requesting Escitalopram   LOV: 6/4/18  RTC: 6 months   Last Relevant Labs: n/a   Filled: 5/14/18 #90 with 0 refills    Future Appointments  Date Time Provider Trace Carmen   12/3/2018 10:00 AM Ludwin Martin,  [facility] [address] [facility] [address]     Pended if oka

## 2018-09-01 ENCOUNTER — MED REC SCAN ONLY (OUTPATIENT)
Dept: FAMILY MEDICINE CLINIC | Facility: CLINIC | Age: 58
End: 2018-09-01

## 2018-09-18 ENCOUNTER — PATIENT MESSAGE (OUTPATIENT)
Dept: FAMILY MEDICINE CLINIC | Facility: CLINIC | Age: 58
End: 2018-09-18

## 2018-09-18 NOTE — TELEPHONE ENCOUNTER
From: Krys Randhawa  To: Jina Willard DO  Sent: 9/18/2018 2:48 PM CDT  Subject: Other    I recently just had my eye exam for my yearly diabetic and they should have sent you the results?  Please let me know so I can follow up with [facility]

## 2018-09-18 NOTE — PROGRESS NOTES
[address] [facility]  Phone: [phone]  LOV: 9/17/18  They will fax over [address] notes for Dr Chelle Garica to review      Patient health maintenance updated.

## 2018-09-24 ENCOUNTER — IMMUNIZATION (OUTPATIENT)
Dept: FAMILY MEDICINE CLINIC | Facility: CLINIC | Age: 58
End: 2018-09-24
Payer: COMMERCIAL

## 2018-09-24 DIAGNOSIS — Z23 NEED FOR VACCINATION: ICD-10-CM

## 2018-09-24 PROCEDURE — 90686 IIV4 VACC NO PRSV 0.5 ML IM: CPT | Performed by: FAMILY MEDICINE

## 2018-09-24 PROCEDURE — 90471 IMMUNIZATION ADMIN: CPT | Performed by: FAMILY MEDICINE

## 2018-09-26 ENCOUNTER — OFFICE VISIT (OUTPATIENT)
Dept: FAMILY MEDICINE CLINIC | Facility: CLINIC | Age: 58
End: 2018-09-26
Payer: COMMERCIAL

## 2018-09-26 VITALS
SYSTOLIC BLOOD PRESSURE: 122 MMHG | HEART RATE: 77 BPM | HEIGHT: 71 IN | RESPIRATION RATE: 16 BRPM | TEMPERATURE: 98 F | WEIGHT: 201.38 LBS | BODY MASS INDEX: 28.19 KG/M2 | DIASTOLIC BLOOD PRESSURE: 78 MMHG

## 2018-09-26 DIAGNOSIS — M54.32 LEFT SIDED SCIATICA: Primary | ICD-10-CM

## 2018-09-26 PROBLEM — M62.08 DIASTASIS OF RECTUS ABDOMINIS: Status: ACTIVE | Noted: 2018-09-26

## 2018-09-26 PROCEDURE — 99213 OFFICE O/P EST LOW 20 MIN: CPT | Performed by: FAMILY MEDICINE

## 2018-09-26 NOTE — PROGRESS NOTES
HPI:   Mariangel Tinoco is a 63year old male that presents for left buttock for 2 weeks. States it feels like sciatic nerve pain. He had back surgery for it previously in 90s. He is taking aleve for pain which helps.      Pain in left buttock, worse with be 100 MG Oral Tab, TAKE 1 TABLET DAILY, Disp: 90 tablet, Rfl: 1  •  METFORMIN HCL 1000 MG Oral Tab, TAKE 1 TABLET TWICE A DAY WITH MEALS, Disp: 180 tablet, Rfl: 1  •  Losartan Potassium 50 MG Oral Tab, Take 1 tablet (50 mg total) by mouth once daily. , Disp: Soft, nondistended, nontender, bowel sounds normal in all 4 quadrants, no masses, no hepatosplenomegaly. +diastasis recti  EXTREMITIES:  No LE edema, no cyanosis, 2+ radial and dp pulses b/l.    BACK: full ROM, no spinous process tenderness, no lumbar par

## 2018-09-26 NOTE — PATIENT INSTRUCTIONS
Sciatica    Sciatica is a condition that causes pain in the lower back that spreads down into the buttock, hip, and leg. Sometimes the leg pain can happen without any back pain.  Sciatica happens when a spinal nerve is irritated or has pressure put on it · When in bed, try to find a position that is comfortable. A firm mattress is best. Try lying flat on your back with pillows under your knees. You can also try lying on your side with your knees bent up toward your chest and a pillow between your knees.   · © 7666-2864 [employer] [address] [facility], [facility] [place]. All rights reserved. This information is not intended as a substitute for professional medical care. Always follow your healthcare professional's instructions.

## 2018-10-01 ENCOUNTER — MED REC SCAN ONLY (OUTPATIENT)
Dept: FAMILY MEDICINE CLINIC | Facility: CLINIC | Age: 58
End: 2018-10-01

## 2018-10-23 PROBLEM — I10 ESSENTIAL HYPERTENSION: Status: ACTIVE | Noted: 2018-10-23

## 2018-10-23 PROBLEM — E11.65 UNCONTROLLED TYPE 2 DIABETES MELLITUS WITH HYPERGLYCEMIA, WITHOUT LONG-TERM CURRENT USE OF INSULIN (HCC): Status: ACTIVE | Noted: 2018-10-23

## 2018-10-23 PROBLEM — E03.9 ACQUIRED HYPOTHYROIDISM: Status: ACTIVE | Noted: 2018-10-23

## 2018-11-10 DIAGNOSIS — F32.A DEPRESSION, UNSPECIFIED DEPRESSION TYPE: Primary | ICD-10-CM

## 2018-11-10 NOTE — TELEPHONE ENCOUNTER
Requesting: Escitalopram 10mg  LOV: 9/26/18  RTC: 3 months  Last Relevant Labs: 6/13/18 from Dr. Valeriano Guzmán: 8/13/18 #90 with 0 refills    Future Appointments   Date Time Provider Trace Carmen   12/3/2018 10:00 AM Ishan Martin Adjutant, DO [facility]

## 2018-11-12 RX ORDER — ESCITALOPRAM OXALATE 10 MG/1
TABLET ORAL
Qty: 90 TABLET | Refills: 3 | Status: SHIPPED | OUTPATIENT
Start: 2018-11-12 | End: 2019-11-09

## 2018-11-22 DIAGNOSIS — I10 ESSENTIAL HYPERTENSION: ICD-10-CM

## 2018-11-22 DIAGNOSIS — E78.2 MIXED HYPERLIPIDEMIA: Primary | ICD-10-CM

## 2018-11-26 RX ORDER — ATORVASTATIN CALCIUM 40 MG/1
TABLET, FILM COATED ORAL
Qty: 90 TABLET | Refills: 1 | Status: SHIPPED | OUTPATIENT
Start: 2018-11-26 | End: 2019-04-08

## 2018-11-26 RX ORDER — LOSARTAN POTASSIUM 50 MG/1
TABLET ORAL
Qty: 90 TABLET | Refills: 1 | Status: SHIPPED | OUTPATIENT
Start: 2018-11-26 | End: 2019-04-08

## 2018-11-26 NOTE — TELEPHONE ENCOUNTER
Requesting:Atorvastatin 40 mg Tabs &                Losartan Potassium 50 mg Tabs  LOV: 09/26/18 Acute & 06/04/18 for physical  RTC: 3 months  Last Relevant Labs: 06/13/18  Both Filled: 06/12/18 #90 with 1 refills    Future Appointments   Date Time Provide

## 2018-12-03 ENCOUNTER — OFFICE VISIT (OUTPATIENT)
Dept: FAMILY MEDICINE CLINIC | Facility: CLINIC | Age: 58
End: 2018-12-03
Payer: COMMERCIAL

## 2018-12-03 VITALS
HEIGHT: 71 IN | HEART RATE: 80 BPM | WEIGHT: 200.38 LBS | DIASTOLIC BLOOD PRESSURE: 70 MMHG | RESPIRATION RATE: 16 BRPM | BODY MASS INDEX: 28.05 KG/M2 | SYSTOLIC BLOOD PRESSURE: 132 MMHG | TEMPERATURE: 98 F

## 2018-12-03 DIAGNOSIS — Z23 NEED FOR VACCINATION: ICD-10-CM

## 2018-12-03 DIAGNOSIS — M54.42 CHRONIC LEFT-SIDED LOW BACK PAIN WITH LEFT-SIDED SCIATICA: Primary | ICD-10-CM

## 2018-12-03 DIAGNOSIS — G89.29 CHRONIC LEFT-SIDED LOW BACK PAIN WITH LEFT-SIDED SCIATICA: Primary | ICD-10-CM

## 2018-12-03 PROCEDURE — 90471 IMMUNIZATION ADMIN: CPT | Performed by: FAMILY MEDICINE

## 2018-12-03 PROCEDURE — 90732 PPSV23 VACC 2 YRS+ SUBQ/IM: CPT | Performed by: FAMILY MEDICINE

## 2018-12-03 PROCEDURE — 99213 OFFICE O/P EST LOW 20 MIN: CPT | Performed by: FAMILY MEDICINE

## 2018-12-03 NOTE — PROGRESS NOTES
HPI:   Claudio Covarrubias is a 63year old male that presents for follow up sciatica.   He completed PT and is doing home exercises which help somewhat but as soon as he bends forward for anything or ties his shoes he has severe shooting sciatic pains in left tablet, Rfl: 1  •  ESCITALOPRAM 10 MG Oral Tab, TAKE 1 TABLET DAILY, Disp: 90 tablet, Rfl: 3  •  INVOKANA 100 MG Oral Tab, TAKE 1 TABLET DAILY, Disp: 90 tablet, Rfl: 1  •  METFORMIN HCL 1000 MG Oral Tab, TAKE 1 TABLET TWICE A DAY WITH MEALS, Disp: 180 tabl negative SLR on R, + left lumbar paraspinal and SI joint tenderness   EXTREMITIES:  No edema, no cyanosis, 2+ radial pulses b/l. NEURO:  A&Ox3. Gait stable, coordination intact. Normal strength, sensation and reflexes throughout.          ASSESSMENT AND

## 2018-12-10 ENCOUNTER — TELEPHONE (OUTPATIENT)
Dept: SURGERY | Facility: CLINIC | Age: 58
End: 2018-12-10

## 2018-12-10 ENCOUNTER — HOSPITAL ENCOUNTER (OUTPATIENT)
Dept: MRI IMAGING | Age: 58
Discharge: HOME | End: 2018-12-10
Attending: FAMILY MEDICINE
Payer: COMMERCIAL

## 2018-12-10 DIAGNOSIS — G89.29 CHRONIC LEFT-SIDED LOW BACK PAIN WITH LEFT-SIDED SCIATICA: ICD-10-CM

## 2018-12-10 DIAGNOSIS — M54.42 CHRONIC LEFT-SIDED LOW BACK PAIN WITH LEFT-SIDED SCIATICA: ICD-10-CM

## 2018-12-10 PROCEDURE — 72148 MRI LUMBAR SPINE W/O DYE: CPT | Performed by: FAMILY MEDICINE

## 2018-12-10 NOTE — TELEPHONE ENCOUNTER
I reviewed his MRI of the lumbar spine. It does look like arachnoiditis. There does not appear to be any compressive pathology that would be amenable to intervention. He should likely see pain management.

## 2018-12-11 ENCOUNTER — TELEPHONE (OUTPATIENT)
Dept: FAMILY MEDICINE CLINIC | Facility: CLINIC | Age: 58
End: 2018-12-11

## 2018-12-11 DIAGNOSIS — G03.9 ARACHNOIDITIS: Primary | ICD-10-CM

## 2018-12-11 NOTE — TELEPHONE ENCOUNTER
Patient called back, again, to discuss his MRI results. I did explain to him. He is aware that neurosurgeon is reviewing MRI and will let us know if this is something they can help with. He is to proceed with seeing pain management, Dr. Perlita Elder.   Patient w

## 2018-12-11 NOTE — TELEPHONE ENCOUNTER
Attempted to return call to Dr. Alec Renya office, office currently  Closed. Was transferred to 's Voicemail. LM on VM for Dr. Maikel Blanc her of message below from Dr. Anabel Palomo.     No possible surgical intervention patient should be referre

## 2018-12-26 ENCOUNTER — OFFICE VISIT (OUTPATIENT)
Dept: PAIN CLINIC | Facility: CLINIC | Age: 58
End: 2018-12-26
Payer: COMMERCIAL

## 2018-12-26 VITALS
SYSTOLIC BLOOD PRESSURE: 150 MMHG | HEIGHT: 71 IN | OXYGEN SATURATION: 97 % | DIASTOLIC BLOOD PRESSURE: 68 MMHG | BODY MASS INDEX: 28.56 KG/M2 | HEART RATE: 89 BPM | WEIGHT: 204 LBS

## 2018-12-26 DIAGNOSIS — M54.16 LUMBAR RADICULITIS: Primary | ICD-10-CM

## 2018-12-26 PROCEDURE — 99204 OFFICE O/P NEW MOD 45 MIN: CPT | Performed by: ANESTHESIOLOGY

## 2018-12-26 RX ORDER — GABAPENTIN 100 MG/1
100 CAPSULE ORAL 3 TIMES DAILY
Qty: 90 CAPSULE | Refills: 0 | Status: SHIPPED | OUTPATIENT
Start: 2018-12-26 | End: 2019-04-08

## 2018-12-26 NOTE — PROGRESS NOTES
Physical Exam   Constitutional:             Location of Pain: left buttocks and left leg     Date Pain Began: 10/4/18          Work Related:   No        Receiving Work Comp/Disability:   No    Numeric Rating Scale:  Pain at Present:  3

## 2018-12-26 NOTE — PATIENT INSTRUCTIONS
Refill policies:    • Allow 2-3 business days for refills; controlled substances may take longer.   • Contact your pharmacy at least 5 days prior to running out of medication and have them send an electronic request or submit request through the “request re entire amount billed. Precertification and Prior Authorizations: If your physician has recommended that you have a procedure or additional testing performed.   [facility]) will contact your insurance carrier to obtain pre-certi

## 2018-12-29 NOTE — PROGRESS NOTES
Name: Keyanna Huang   : 3/22/1960   DOS: 2018     Chief complaint: Low back pain    History of present illness:  Keyanna Huang is a 63year old male complaining of   pain in the lower back for 2 months.   The patient had lumbar discectomy durin 12/05/2002   • Type 2 diabetes mellitus, uncontrolled ([facility] 4/7/2016        Current Outpatient Medications:  gabapentin 100 MG Oral Cap Take 1 capsule (100 mg total) by mouth 3 (three) times daily.  Disp: 90 capsule Rfl: 0   METFORMIN HCL 1000 MG Oral Tab T exertion, ischemia in extremities. Endocrine: Negative. Specifically denies thyroid disorder, diabetes mellitus, osteoporosis or any other endocrine problems. Respiratory:  Denies shortness of breath, wheezing, coughing.   Gastrointestinal:  Denies abdom Reflexes:            Biceps:     5   5   Triceps:    5   5   Brachioradialis:               5   5  Sensory Examination:    R   L   C5:     N   N   C6:     N   N   C7:     N   N   C8:     N   N   T1:     N   N    Cardiovascular system: Regular rate and rhyt N    Radiology diagnostic studies: MRI of lumbar spine was reviewed. MRI of lumbar spine showed postoperative changes at L5-S1 level, degenerative disc disease multiple level and there is a possibility of arachnoiditis at L2 level.     Assessment:  Lum

## 2018-12-31 ENCOUNTER — TELEPHONE (OUTPATIENT)
Dept: NEUROLOGY | Facility: CLINIC | Age: 58
End: 2018-12-31

## 2018-12-31 NOTE — TELEPHONE ENCOUNTER
Spoke with Delisa Cervantes at [address]    Per Delisa Cervantes request does not meet medical criteria. Provider needs to contact [facility] for physician review at [phone] and request to speak to physician reviewer and give patient's name/.      Patient is scheduled for MRI on 19

## 2019-01-02 ENCOUNTER — TELEPHONE (OUTPATIENT)
Dept: PAIN CLINIC | Facility: CLINIC | Age: 59
End: 2019-01-02

## 2019-01-03 ENCOUNTER — TELEPHONE (OUTPATIENT)
Dept: FAMILY MEDICINE CLINIC | Facility: CLINIC | Age: 59
End: 2019-01-03

## 2019-01-03 NOTE — TELEPHONE ENCOUNTER
Patient would like to know if he should continue to take his BP medication. He saw on the news that it has been recalled due to cancer causing issues.

## 2019-01-03 NOTE — TELEPHONE ENCOUNTER
DAYNAOM for pt to call our office to schedule an appt to discuss his concerns regarding his medicaiton.

## 2019-01-04 NOTE — TELEPHONE ENCOUNTER
Left message on pt's vm letting him know he needs to CB to schedule an appt to discuss medication with pcp

## 2019-01-14 ENCOUNTER — HOSPITAL ENCOUNTER (OUTPATIENT)
Dept: MRI IMAGING | Facility: HOSPITAL | Age: 59
Discharge: HOME | End: 2019-01-14
Attending: ANESTHESIOLOGY
Payer: COMMERCIAL

## 2019-01-14 DIAGNOSIS — M54.16 LUMBAR RADICULITIS: ICD-10-CM

## 2019-01-14 PROCEDURE — 82565 ASSAY OF CREATININE: CPT

## 2019-01-14 PROCEDURE — 72158 MRI LUMBAR SPINE W/O & W/DYE: CPT | Performed by: ANESTHESIOLOGY

## 2019-01-14 PROCEDURE — A9575 INJ GADOTERATE MEGLUMI 0.1ML: HCPCS | Performed by: ANESTHESIOLOGY

## 2019-01-15 LAB — CREAT SERPL-MCNC: 0.8 MG/DL (ref 0.7–1.3)

## 2019-01-16 ENCOUNTER — OFFICE VISIT (OUTPATIENT)
Dept: SURGERY | Facility: CLINIC | Age: 59
End: 2019-01-16
Payer: COMMERCIAL

## 2019-01-16 VITALS
HEART RATE: 80 BPM | BODY MASS INDEX: 28.56 KG/M2 | SYSTOLIC BLOOD PRESSURE: 140 MMHG | WEIGHT: 204 LBS | HEIGHT: 71 IN | DIASTOLIC BLOOD PRESSURE: 80 MMHG

## 2019-01-16 DIAGNOSIS — M54.42 LEFT-SIDED LOW BACK PAIN WITH LEFT-SIDED SCIATICA, UNSPECIFIED CHRONICITY: Primary | ICD-10-CM

## 2019-01-16 PROCEDURE — 99214 OFFICE O/P EST MOD 30 MIN: CPT | Performed by: PHYSICIAN ASSISTANT

## 2019-01-16 NOTE — H&P
Neurosurgery Clinic Visit  2019    Allen Lind PCP:  Nicolas Shahid DO    3/22/1960 MRN [id number]       CC:  Back/Left leg pain    HPI:    Sarah Begum is a very pleasant 63year old male who presents with back and left leg symptoms for the last f Social History    Socioeconomic History      Marital status:       Spouse name: Not on file      Number of children: Not on file      Years of education: Not on file      Highest education level: Not on file    Occupational History      Occupati 5 5 5 5     Lower extremity strength:    Iliopsoas Quad Hamstring D-Flexion EHL P-Flexion Eversion Inversion   Right 5 5 5 5 5 5 5 5   Left 5 5 5 5 5 5 5 5     Reflexes:    Biceps Brachioradialis Triceps Patellar Ankle Martinez's Clonus   Right 2+ 2+ 2+ 2+

## 2019-01-16 NOTE — PROGRESS NOTES
Location of Pain:  Pt states that he has lower back pain left sided. Pt states that he has radiating pain. In the left left. Pt states that he has no numbness or tingling in the left leg. Pt states that he has no weakness in the left leg.  Pt states that he

## 2019-01-28 ENCOUNTER — OFFICE VISIT (OUTPATIENT)
Dept: FAMILY MEDICINE CLINIC | Facility: CLINIC | Age: 59
End: 2019-01-28
Payer: COMMERCIAL

## 2019-01-28 VITALS
TEMPERATURE: 98 F | HEART RATE: 103 BPM | DIASTOLIC BLOOD PRESSURE: 70 MMHG | WEIGHT: 202 LBS | OXYGEN SATURATION: 99 % | RESPIRATION RATE: 16 BRPM | SYSTOLIC BLOOD PRESSURE: 128 MMHG | HEIGHT: 71 IN | BODY MASS INDEX: 28.28 KG/M2

## 2019-01-28 DIAGNOSIS — R05.9 COUGH: Primary | ICD-10-CM

## 2019-01-28 PROCEDURE — 99213 OFFICE O/P EST LOW 20 MIN: CPT | Performed by: FAMILY MEDICINE

## 2019-01-28 RX ORDER — ALBUTEROL SULFATE 90 UG/1
2 AEROSOL, METERED RESPIRATORY (INHALATION) EVERY 4 HOURS PRN
Qty: 1 INHALER | Refills: 5 | Status: SHIPPED | OUTPATIENT
Start: 2019-01-28 | End: 2019-12-09

## 2019-01-28 RX ORDER — BENZONATATE 100 MG/1
100 CAPSULE ORAL 3 TIMES DAILY PRN
Qty: 30 CAPSULE | Refills: 0 | Status: SHIPPED | OUTPATIENT
Start: 2019-01-28 | End: 2019-03-13

## 2019-01-28 RX ORDER — PREDNISONE 20 MG/1
20 TABLET ORAL DAILY
Qty: 5 TABLET | Refills: 0 | Status: SHIPPED | OUTPATIENT
Start: 2019-01-28 | End: 2019-02-02

## 2019-01-28 RX ORDER — AZITHROMYCIN 250 MG/1
TABLET, FILM COATED ORAL
Qty: 6 TABLET | Refills: 0 | Status: SHIPPED | OUTPATIENT
Start: 2019-01-28 | End: 2019-03-13

## 2019-01-28 NOTE — PROGRESS NOTES
HPI:   Mina Jacobo is a 63year old male that presents for chest cold for one week. States it started with runny nose then moved to chest about 4 days ago. Sick contacts: Yes, wife works at school and also sick.   Medications tried at home: albuterol in Aero Soln, Inhale 2 puffs into the lungs every 4 (four) hours as needed for Wheezing or Shortness of Breath., Disp: 1 Inhaler, Rfl: 5  •  predniSONE 20 MG Oral Tab, Take 1 tablet (20 mg total) by mouth daily for 5 days. , Disp: 5 tablet, Rfl: 0  •  azithrom effusion   Nose: patent, no nasal discharge    Throat:  No tonsillar erythema or exudate. Mouth:  No oral lesions or ulcerations, good dentition. NECK: Supple, shoddy tender cervical LAD, no thyromegaly.   SKIN: No rashes, no skin lesion, no bruising,

## 2019-03-13 ENCOUNTER — OFFICE VISIT (OUTPATIENT)
Dept: FAMILY MEDICINE CLINIC | Facility: CLINIC | Age: 59
End: 2019-03-13
Payer: COMMERCIAL

## 2019-03-13 VITALS
RESPIRATION RATE: 16 BRPM | SYSTOLIC BLOOD PRESSURE: 126 MMHG | BODY MASS INDEX: 27.86 KG/M2 | WEIGHT: 199 LBS | HEIGHT: 71 IN | HEART RATE: 71 BPM | DIASTOLIC BLOOD PRESSURE: 64 MMHG | TEMPERATURE: 97 F | OXYGEN SATURATION: 98 %

## 2019-03-13 DIAGNOSIS — B00.1 COLD SORE: Primary | ICD-10-CM

## 2019-03-13 PROCEDURE — 99213 OFFICE O/P EST LOW 20 MIN: CPT | Performed by: FAMILY MEDICINE

## 2019-03-13 RX ORDER — VALACYCLOVIR HYDROCHLORIDE 1 G/1
TABLET, FILM COATED ORAL
Qty: 30 TABLET | Refills: 1 | Status: SHIPPED | OUTPATIENT
Start: 2019-03-13 | End: 2021-06-21

## 2019-03-13 NOTE — PROGRESS NOTES
HPI:   Rosi Rogers is a 63year old male that presents for sore on lips, blister, burning sensation for past 4 days. He has been using OTC neosporin without relief. He has had cold sores in the past. Has not taken pills before. No recent illness. INVOKANA 100 MG Oral Tab, TAKE 1 TABLET DAILY, Disp: 90 tablet, Rfl: 1  •  gabapentin 100 MG Oral Cap, Take 1 capsule (100 mg total) by mouth 3 (three) times daily. , Disp: 90 capsule, Rfl: 0  •  METFORMIN HCL 1000 MG Oral Tab, TAKE 1 TABLET TWICE A DAY WIT sore  - valACYclovir HCl 1 G Oral Tab; Take 2 tablets at first sign of cold sore then repeat in 12 hours, then stop. Dispense: 30 tablet; Refill: 1      Risks, benefits, and alternatives of current treatment plan discussed in detail.   Questions and concer

## 2019-03-13 NOTE — PATIENT INSTRUCTIONS
Abreva topical cream  Valtrex pills for one day at the first sign of symptoms. Understanding Cold Sores  Cold sores are small blisters or sores on the lip or sometimes inside the mouth. Many people get them from time to time.  Cold sores usually are develops 12 to 24 hours before the sore become visible. · Flu-like symptoms, including swollen glands, headache, body ache, or fever. These typically occur only at the time of the first infection. Cold sores may also occur on fingers.  They may rarely inf area of a cold sore. When this happens, wash your hands thoroughly, for at least 20 seconds. When you can’t wash with soap and water, use an alcohol-based hand .   · Disinfect things you touch often, such as phones and keyboards.    · If you feel a

## 2019-04-01 ENCOUNTER — TELEPHONE (OUTPATIENT)
Dept: FAMILY MEDICINE CLINIC | Facility: CLINIC | Age: 59
End: 2019-04-01

## 2019-04-01 DIAGNOSIS — I10 ESSENTIAL HYPERTENSION: Primary | ICD-10-CM

## 2019-04-01 NOTE — TELEPHONE ENCOUNTER
Future Appointments   Date Time Provider Trace Carmen   4/8/2019  8:30 AM Cierra Martin, DO [facility] [address] [facility] [address]   4/22/2019  9:45 AM Ana Roth MD [facility]     CBC pended for review.   Everything else Dr. Manav Ray ordered for patient to have

## 2019-04-02 ENCOUNTER — LAB ENCOUNTER (OUTPATIENT)
Dept: LAB | Age: 59
End: 2019-04-02
Attending: INTERNAL MEDICINE
Payer: COMMERCIAL

## 2019-04-02 DIAGNOSIS — E23.6 EMPTY SELLA (HCC): ICD-10-CM

## 2019-04-02 DIAGNOSIS — E03.9 ACQUIRED HYPOTHYROIDISM: ICD-10-CM

## 2019-04-02 DIAGNOSIS — E23.0 HYPOGONADOTROPIC HYPOGONADISM (HCC): ICD-10-CM

## 2019-04-02 DIAGNOSIS — I10 ESSENTIAL HYPERTENSION: ICD-10-CM

## 2019-04-02 DIAGNOSIS — E11.65 UNCONTROLLED TYPE 2 DIABETES MELLITUS WITH HYPERGLYCEMIA, WITHOUT LONG-TERM CURRENT USE OF INSULIN (HCC): ICD-10-CM

## 2019-04-02 DIAGNOSIS — E78.2 MIXED HYPERLIPIDEMIA: ICD-10-CM

## 2019-04-02 PROCEDURE — 84443 ASSAY THYROID STIM HORMONE: CPT

## 2019-04-02 PROCEDURE — 36415 COLL VENOUS BLD VENIPUNCTURE: CPT

## 2019-04-02 PROCEDURE — 85025 COMPLETE CBC W/AUTO DIFF WBC: CPT

## 2019-04-02 PROCEDURE — 80061 LIPID PANEL: CPT

## 2019-04-02 PROCEDURE — 84439 ASSAY OF FREE THYROXINE: CPT

## 2019-04-02 PROCEDURE — 80053 COMPREHEN METABOLIC PANEL: CPT

## 2019-04-02 PROCEDURE — 83036 HEMOGLOBIN GLYCOSYLATED A1C: CPT

## 2019-04-08 ENCOUNTER — OFFICE VISIT (OUTPATIENT)
Dept: FAMILY MEDICINE CLINIC | Facility: CLINIC | Age: 59
End: 2019-04-08
Payer: COMMERCIAL

## 2019-04-08 VITALS
TEMPERATURE: 97 F | HEIGHT: 71 IN | SYSTOLIC BLOOD PRESSURE: 120 MMHG | HEART RATE: 88 BPM | BODY MASS INDEX: 27.91 KG/M2 | DIASTOLIC BLOOD PRESSURE: 60 MMHG | WEIGHT: 199.38 LBS | RESPIRATION RATE: 16 BRPM

## 2019-04-08 DIAGNOSIS — Z13.31 NEGATIVE DEPRESSION SCREENING: ICD-10-CM

## 2019-04-08 DIAGNOSIS — E11.65 UNCONTROLLED TYPE 2 DIABETES MELLITUS WITH HYPERGLYCEMIA, WITHOUT LONG-TERM CURRENT USE OF INSULIN (HCC): ICD-10-CM

## 2019-04-08 DIAGNOSIS — I10 ESSENTIAL HYPERTENSION: ICD-10-CM

## 2019-04-08 DIAGNOSIS — E78.2 MIXED HYPERLIPIDEMIA: ICD-10-CM

## 2019-04-08 DIAGNOSIS — J45.20 MILD INTERMITTENT ASTHMA WITHOUT COMPLICATION: ICD-10-CM

## 2019-04-08 DIAGNOSIS — Z00.00 ANNUAL PHYSICAL EXAM: Primary | ICD-10-CM

## 2019-04-08 PROCEDURE — 99213 OFFICE O/P EST LOW 20 MIN: CPT | Performed by: FAMILY MEDICINE

## 2019-04-08 PROCEDURE — 99396 PREV VISIT EST AGE 40-64: CPT | Performed by: FAMILY MEDICINE

## 2019-04-08 RX ORDER — GABAPENTIN 100 MG/1
100 CAPSULE ORAL 3 TIMES DAILY
Qty: 90 CAPSULE | Refills: 0 | Status: SHIPPED | OUTPATIENT
Start: 2019-04-08 | End: 2019-10-18

## 2019-04-08 RX ORDER — LOSARTAN POTASSIUM 50 MG/1
50 TABLET ORAL
Qty: 90 TABLET | Refills: 3 | Status: SHIPPED | OUTPATIENT
Start: 2019-04-08 | End: 2020-04-27

## 2019-04-08 RX ORDER — ATORVASTATIN CALCIUM 40 MG/1
TABLET, FILM COATED ORAL
Qty: 90 TABLET | Refills: 3 | Status: SHIPPED | OUTPATIENT
Start: 2019-04-08 | End: 2020-04-27

## 2019-04-08 NOTE — PATIENT INSTRUCTIONS
Thank you for allowing me to participate in your care today. I will contact you with any results from today's visit. Lab results are typically available in 2-3 days for blood tests, and 3-5 days for any cultures or Paps.    Please let me know if you hav every 3 years (yearly if your blood sugar has already begun to rise)   Type 2 diabetes All men with prediabetes Every year   Hepatitis C Men at increased risk for infection – talk with your healthcare provider At routine exams.  All men ages 48 to 79 should mumps, rubella (MMR) Men in this age group through their late 46s who have no record of these infections or vaccines 1 or 2 doses; ask your healthcare provider   Meningococcal Men at increased risk for infection – talk with your healthcare provider 1 or mo

## 2019-04-08 NOTE — PROGRESS NOTES
Isak Ratliff is a 63year old male that presents for annual physical exam. Labs completed and reviewed with patient in detail.      Diet and exercise: No  STI testing desired: No  Colonoscopy: 9/26/11  PSA: done 7/17/17 - WNL  PHQ2: 0  Vaccines: UTD    C consistent with            empty sella, a benign             process      Depression      Schatzki's ring             UGI revealed what looks to be a Schatzki's ring            2014            Colleen gi          Current Outpatient Medications:   •  Gabo sella, a benign process   • GERD (gastroesophageal reflux disease)    • Hypogonadotropic hypogonadism ([facility] 4/7/2016   • Internal hemorrhoids    • Low back pain 4/20/10    recurrent   • Mixed hyperlipidemia 4/7/2016   • Mononucleosis 7.2014   • Murmur, car Not on file        Relationship status: Not on file      Intimate partner violence:        Fear of current or ex partner: Not on file        Emotionally abused: Not on file        Physically abused: Not on file        Forced sexual activity: Not on file palpable nodules  CHEST: no chest tenderness  LUNGS: clear to auscultation  CARDIO: RRR without murmur  GI: +bowel sounds, no masses, HSM or tenderness  MUSCULOSKELETAL: back is not tender  EXTREMITIES: no cyanosis, clubbing or edema.   Bilateral barefoot s once daily. Dispense: 90 tablet; Refill: 3  - Semaglutide (OZEMPIC) 0.25 or 0.5 MG/DOSE Subcutaneous Solution Pen-injector; Inject 0.25 mg into the skin once a week.   Dispense: 1 pen; Refill: 0      Risks, benefits, and alternatives of current treatment p

## 2019-04-09 ENCOUNTER — TELEPHONE (OUTPATIENT)
Dept: FAMILY MEDICINE CLINIC | Facility: CLINIC | Age: 59
End: 2019-04-09

## 2019-04-09 DIAGNOSIS — E11.65 UNCONTROLLED TYPE 2 DIABETES MELLITUS WITH HYPERGLYCEMIA, WITHOUT LONG-TERM CURRENT USE OF INSULIN (HCC): Primary | ICD-10-CM

## 2019-04-10 NOTE — TELEPHONE ENCOUNTER
Patient has a question about his medication  Semaglutide (OZEMPIC) 0.25 or 0.5 MG/DOSE Subcutaneous Solution Pen-injector  He was given samples to use and is wondering if he is supposed to increase to 0.5 after a period of time or just stay with 0.25 injec

## 2019-04-10 NOTE — TELEPHONE ENCOUNTER
See attached phone message.   Last OV 4-8-19 Dr. Jarvis Peterson Physical partial notes:    - will add ozempic 0.25 mg weekly for 6 weeks (sample given) then titrate up to 0.5 mg weekly (will need rx) if tolerating well, recheck a1c in 3 months, patient will resume

## 2019-04-22 DIAGNOSIS — E11.65 UNCONTROLLED TYPE 2 DIABETES MELLITUS WITH HYPERGLYCEMIA, WITHOUT LONG-TERM CURRENT USE OF INSULIN (HCC): ICD-10-CM

## 2019-04-22 NOTE — TELEPHONE ENCOUNTER
Pt is calling to update Dr. eSlvin Ahmadi on new medication. This is working well and would like a refill sent to [address], [facility] [phone], [phone]    Please call in 90 day supply per insurance.     Kyle Carcamo

## 2019-04-24 NOTE — TELEPHONE ENCOUNTER
Requested Medications      Semaglutide (OZEMPIC) 0.25 or 0.5 MG/DOSE Subcutaneous Solution Pen-injector         Sig: Inject 0.25 mg into the skin once a week.     Disp:  3 pen    Refills:  0    Start: 4/22/2019    Class: Normal    For: Uncontrolled type 2 d

## 2019-05-20 RX ORDER — OMEPRAZOLE 20 MG/1
CAPSULE, DELAYED RELEASE ORAL
Qty: 90 CAPSULE | Refills: 3 | Status: SHIPPED | OUTPATIENT
Start: 2019-05-20 | End: 2020-05-14

## 2019-05-20 NOTE — TELEPHONE ENCOUNTER
Requesting: Omeprazole 20mg  LOV: 4/8/19  RTC: 3 months  Last Relevant Labs: 4/2/19  Filled: 5/25/18 #90 with 3 refills    Future Appointments   Date Time Provider Trace Carmen   7/29/2019 10:15 AM Kristofer Navarro MD [address] [place] [address]     -Medication pen

## 2019-07-08 ENCOUNTER — APPOINTMENT (OUTPATIENT)
Dept: LAB | Age: 59
End: 2019-07-08
Attending: FAMILY MEDICINE
Payer: COMMERCIAL

## 2019-07-08 DIAGNOSIS — E11.65 UNCONTROLLED TYPE 2 DIABETES MELLITUS WITH HYPERGLYCEMIA, WITHOUT LONG-TERM CURRENT USE OF INSULIN (HCC): ICD-10-CM

## 2019-07-08 LAB
EST. AVERAGE GLUCOSE BLD GHB EST-MCNC: 160 MG/DL (ref 68–126)
HBA1C MFR BLD HPLC: 7.2 % (ref ?–5.7)

## 2019-07-08 PROCEDURE — 83036 HEMOGLOBIN GLYCOSYLATED A1C: CPT

## 2019-07-08 PROCEDURE — 36415 COLL VENOUS BLD VENIPUNCTURE: CPT

## 2019-07-15 DIAGNOSIS — E11.65 UNCONTROLLED TYPE 2 DIABETES MELLITUS WITH HYPERGLYCEMIA, WITHOUT LONG-TERM CURRENT USE OF INSULIN (HCC): ICD-10-CM

## 2019-07-15 NOTE — TELEPHONE ENCOUNTER
Pt is out of town in [place] and forgot to bring the ozempic. Please send to [facility] , [address], [place]. Phone number [phone].

## 2019-08-05 ENCOUNTER — OFFICE VISIT (OUTPATIENT)
Dept: FAMILY MEDICINE CLINIC | Facility: CLINIC | Age: 59
End: 2019-08-05
Payer: COMMERCIAL

## 2019-08-05 VITALS
BODY MASS INDEX: 26.77 KG/M2 | RESPIRATION RATE: 16 BRPM | DIASTOLIC BLOOD PRESSURE: 60 MMHG | HEIGHT: 71 IN | SYSTOLIC BLOOD PRESSURE: 110 MMHG | HEART RATE: 83 BPM | TEMPERATURE: 98 F | WEIGHT: 191.25 LBS | OXYGEN SATURATION: 99 %

## 2019-08-05 DIAGNOSIS — E11.65 UNCONTROLLED TYPE 2 DIABETES MELLITUS WITH HYPERGLYCEMIA, WITHOUT LONG-TERM CURRENT USE OF INSULIN (HCC): Primary | ICD-10-CM

## 2019-08-05 DIAGNOSIS — Z23 NEED FOR VACCINATION: ICD-10-CM

## 2019-08-05 DIAGNOSIS — Z12.5 PROSTATE CANCER SCREENING: ICD-10-CM

## 2019-08-05 LAB
CREAT UR-SCNC: 68 MG/DL
MICROALBUMIN UR-MCNC: 0.64 MG/DL
MICROALBUMIN/CREAT 24H UR-RTO: 9.4 UG/MG (ref ?–30)

## 2019-08-05 PROCEDURE — 90471 IMMUNIZATION ADMIN: CPT | Performed by: FAMILY MEDICINE

## 2019-08-05 PROCEDURE — 99213 OFFICE O/P EST LOW 20 MIN: CPT | Performed by: FAMILY MEDICINE

## 2019-08-05 PROCEDURE — 82043 UR ALBUMIN QUANTITATIVE: CPT | Performed by: FAMILY MEDICINE

## 2019-08-05 PROCEDURE — 90750 HZV VACC RECOMBINANT IM: CPT | Performed by: FAMILY MEDICINE

## 2019-08-05 PROCEDURE — 82570 ASSAY OF URINE CREATININE: CPT | Performed by: FAMILY MEDICINE

## 2019-08-05 NOTE — PROGRESS NOTES
HPI:   Marilin Lorenzo is a 63year old male that presents for diabetes check-up. a1c improving 8.1-->7.2 with ozempic. Feels he still gets some high sugars on home readings.       Current meds: metformin, invokana, ozempic   Home glucose readings: glu 150 Disp: 3 pen, Rfl: 1  •  OMEPRAZOLE 20 MG Oral Capsule Delayed Release, TAKE 1 CAPSULE DAILY, Disp: 90 capsule, Rfl: 3  •  Losartan Potassium 50 MG Oral Tab, Take 1 tablet (50 mg total) by mouth once daily. , Disp: 90 tablet, Rfl: 3  •  atorvastatin 40 MG Or gallops. LUNGS: Clear to auscultation bilterally, no rales/rhonchi/wheezing. ABDOMEN:  Soft, nondistended, nontender, bowel sounds normal in all 4 quadrants, no masses, no hepatosplenomegaly. EXTREMITIES:  No edema, no cyanosis, 2+ radial pulses b/l.

## 2019-08-05 NOTE — PATIENT INSTRUCTIONS
Increase ozempic to 0.5 mg once per week  Return for labs after October 8th  Schedule eye exam, due in October

## 2019-08-12 ENCOUNTER — TELEPHONE (OUTPATIENT)
Dept: FAMILY MEDICINE CLINIC | Facility: CLINIC | Age: 59
End: 2019-08-12

## 2019-08-12 RX ORDER — PEN NEEDLE, DIABETIC 30 GX3/16"
1 NEEDLE, DISPOSABLE MISCELLANEOUS
Qty: 90 EACH | Refills: 0 | Status: SHIPPED | OUTPATIENT
Start: 2019-08-12

## 2019-08-12 NOTE — TELEPHONE ENCOUNTER
Attempted to reach pt, LMOM per HIPAA consent advising new scripts sent to [facility] for Ozempic.

## 2019-08-12 NOTE — TELEPHONE ENCOUNTER
Patient states the Ozempic .5 (increased dose) was supposed to go to mail order, can you please re-direct to [facility].

## 2019-08-19 ENCOUNTER — TELEPHONE (OUTPATIENT)
Dept: FAMILY MEDICINE CLINIC | Facility: CLINIC | Age: 59
End: 2019-08-19

## 2019-08-19 NOTE — TELEPHONE ENCOUNTER
Pt states he is having really bad sciatica pain radiating down his leg.  He would like to know what he needs to do

## 2019-08-19 NOTE — TELEPHONE ENCOUNTER
Bending a lot at work recently, pain left leg \"whole leg\", tingling and numbness in left foot, constant. Restarted the Gabapentin, on Ibuprofen. MRI Lumbar 12/2018, saw Dr. Arie Jacobson. Scheduled OV with Dr. Ashely Jimenez for 8-20-19.

## 2019-08-20 ENCOUNTER — OFFICE VISIT (OUTPATIENT)
Dept: FAMILY MEDICINE CLINIC | Facility: CLINIC | Age: 59
End: 2019-08-20
Payer: COMMERCIAL

## 2019-08-20 VITALS
BODY MASS INDEX: 26.11 KG/M2 | SYSTOLIC BLOOD PRESSURE: 110 MMHG | RESPIRATION RATE: 16 BRPM | DIASTOLIC BLOOD PRESSURE: 60 MMHG | TEMPERATURE: 97 F | HEART RATE: 88 BPM | HEIGHT: 71 IN | WEIGHT: 186.5 LBS

## 2019-08-20 DIAGNOSIS — M54.32 SCIATICA, LEFT SIDE: Primary | ICD-10-CM

## 2019-08-20 PROCEDURE — 99213 OFFICE O/P EST LOW 20 MIN: CPT | Performed by: FAMILY MEDICINE

## 2019-08-20 RX ORDER — CYCLOBENZAPRINE HCL 10 MG
10 TABLET ORAL NIGHTLY PRN
Qty: 15 TABLET | Refills: 0 | Status: SHIPPED | OUTPATIENT
Start: 2019-08-20 | End: 2020-11-23

## 2019-08-20 NOTE — PATIENT INSTRUCTIONS
Sciatic Pain  Increase gabapentin to 200 mg nightly during flare up  Cyclobenzaprine 5-10 mg nightly as needed   Call if not improving can try physical and/or steroid dose pack  Continue home stretches

## 2019-08-20 NOTE — PROGRESS NOTES
HPI:   Martínez Duncan is a 63year old male that presents for sciatica flare up, shooting pain from buttock going down left leg to foot. Some tingling at the bottom of feet b/l.   States gabapentin not helping as much as before, he is doing his PT exercis Insulin Pen Needle (PEN NEEDLES) 32G X 4 MM Does not apply Misc, 1 each by Does not apply route every 7 days. , Disp: 90 each, Rfl: 0  •  Semaglutide (OZEMPIC) 0.25 or 0.5 MG/DOSE Subcutaneous Solution Pen-injector, Inject 0.5 mg into the skin once a week. , Normocephalic, atraumatic    Eyes: no scleral icterus, conjunctivae clear, no eye discharge   NECK: Supple, no cervical LAD, no thyromegaly. SKIN: No rashes, no skin lesion, no bruising, good turgor.   HEART:  Regular rate and rhythm, no murmurs, rubs or g

## 2019-09-09 ENCOUNTER — TELEPHONE (OUTPATIENT)
Dept: FAMILY MEDICINE CLINIC | Facility: CLINIC | Age: 59
End: 2019-09-09

## 2019-09-09 NOTE — TELEPHONE ENCOUNTER
Dr. Ej William- Pt went to see Dr. Isael Mueller and it was suggested to Steroid injections. Please advise. Please call [phone]  Pt will come in for appointment to discuss if needed.

## 2019-09-09 NOTE — TELEPHONE ENCOUNTER
Informed pt of Dr. Jared Maldonado recommendations and he expressed understanding and agreement. Task completed.

## 2019-09-09 NOTE — TELEPHONE ENCOUNTER
I think trial of steroid injection is reasonable. Should not affect sugars significantly and if it helps for a few months, that would be good.       Anh Cohen, DO  Family Medicine

## 2019-09-09 NOTE — TELEPHONE ENCOUNTER
Patient went to see Dr Ren Marcano, and they recommended steroid injections for bulging disc. Patient would like Dr Ryne Post recommendations/ if MD thinks this is a good idea. Patient states he does not want to take oral steroids.      Dr Ren Marcano  Address: [address] [place] [address] [phone]

## 2019-09-09 NOTE — TELEPHONE ENCOUNTER
Attempted to reach pt, no answer, left message to contact the office. OV 8/20/19  1. Sciatica, left side  - Cyclobenzaprine HCl 10 MG Oral Tab; Take 1 tablet (10 mg total) by mouth nightly as needed for Muscle spasms. Dispense: 15 tablet;  Refill: 0  -

## 2019-09-30 ENCOUNTER — IMMUNIZATION (OUTPATIENT)
Dept: FAMILY MEDICINE CLINIC | Facility: CLINIC | Age: 59
End: 2019-09-30
Payer: COMMERCIAL

## 2019-09-30 ENCOUNTER — APPOINTMENT (OUTPATIENT)
Dept: LAB | Age: 59
End: 2019-09-30
Attending: FAMILY MEDICINE
Payer: COMMERCIAL

## 2019-09-30 DIAGNOSIS — E11.65 UNCONTROLLED TYPE 2 DIABETES MELLITUS WITH HYPERGLYCEMIA, WITHOUT LONG-TERM CURRENT USE OF INSULIN (HCC): ICD-10-CM

## 2019-09-30 DIAGNOSIS — Z23 NEED FOR VACCINATION: ICD-10-CM

## 2019-09-30 DIAGNOSIS — Z12.5 PROSTATE CANCER SCREENING: ICD-10-CM

## 2019-09-30 PROCEDURE — 80053 COMPREHEN METABOLIC PANEL: CPT

## 2019-09-30 PROCEDURE — 84153 ASSAY OF PSA TOTAL: CPT

## 2019-09-30 PROCEDURE — 36415 COLL VENOUS BLD VENIPUNCTURE: CPT

## 2019-09-30 PROCEDURE — 83036 HEMOGLOBIN GLYCOSYLATED A1C: CPT

## 2019-09-30 PROCEDURE — 90686 IIV4 VACC NO PRSV 0.5 ML IM: CPT | Performed by: FAMILY MEDICINE

## 2019-09-30 PROCEDURE — 90471 IMMUNIZATION ADMIN: CPT | Performed by: FAMILY MEDICINE

## 2019-10-05 ENCOUNTER — HOSPITAL ENCOUNTER (EMERGENCY)
Age: 59
Discharge: HOME | End: 2019-10-05
Attending: EMERGENCY MEDICINE
Payer: COMMERCIAL

## 2019-10-05 VITALS
DIASTOLIC BLOOD PRESSURE: 74 MMHG | SYSTOLIC BLOOD PRESSURE: 126 MMHG | BODY MASS INDEX: 25.2 KG/M2 | HEART RATE: 75 BPM | TEMPERATURE: 99 F | RESPIRATION RATE: 14 BRPM | WEIGHT: 180 LBS | OXYGEN SATURATION: 97 % | HEIGHT: 71 IN

## 2019-10-05 DIAGNOSIS — M54.32 SCIATICA OF LEFT SIDE: Primary | ICD-10-CM

## 2019-10-05 PROCEDURE — 99284 EMERGENCY DEPT VISIT MOD MDM: CPT

## 2019-10-05 PROCEDURE — 96374 THER/PROPH/DIAG INJ IV PUSH: CPT

## 2019-10-05 PROCEDURE — 96375 TX/PRO/DX INJ NEW DRUG ADDON: CPT

## 2019-10-05 RX ORDER — KETOROLAC TROMETHAMINE 30 MG/ML
30 INJECTION, SOLUTION INTRAMUSCULAR; INTRAVENOUS ONCE
Status: COMPLETED | OUTPATIENT
Start: 2019-10-05 | End: 2019-10-05

## 2019-10-05 RX ORDER — HYDROCODONE BITARTRATE AND ACETAMINOPHEN 5; 325 MG/1; MG/1
1-2 TABLET ORAL EVERY 6 HOURS PRN
Qty: 10 TABLET | Refills: 0 | Status: SHIPPED | OUTPATIENT
Start: 2019-10-05 | End: 2019-10-12

## 2019-10-05 RX ORDER — HYDROMORPHONE HYDROCHLORIDE 1 MG/ML
1 INJECTION, SOLUTION INTRAMUSCULAR; INTRAVENOUS; SUBCUTANEOUS ONCE
Status: COMPLETED | OUTPATIENT
Start: 2019-10-05 | End: 2019-10-05

## 2019-10-05 NOTE — ED PROVIDER NOTES
Patient Seen in: THE [facility] Emergency Department In [place]      History   Patient presents with:  Lower Extremity Injury (musculoskeletal)  Back Pain (musculoskeletal)    Stated Complaint: lt side leg pain/sciatica    HPI    Patient is a 50-year-old male 7/15/2016   • BELLA on CPAP    • Schatzki's ring 12/05/2002   • Type 2 diabetes mellitus, uncontrolled ([facility] 4/7/2016              Past Surgical History:   Procedure Laterality Date   • BACK SURGERY  1997    repair shattered L5 disc  no metal remains   • COL thomas. NEURO: Patient answers questions appropriately. No focal deficits appreciated. ED Course   Labs Reviewed - No data to display               MDM     Patient did have an IV established is given Toradol initially.   Patient had mild improveme

## 2019-10-05 NOTE — ED INITIAL ASSESSMENT (HPI)
Pt here via ems for lt sided leg pain/sciatic pain. States he was lifting a mattress today after receiving a cortisone injection on 10/2 then felt severe pain shooting down lt leg with tingling to lt foot.

## 2019-10-08 ENCOUNTER — TELEPHONE (OUTPATIENT)
Dept: SURGERY | Facility: CLINIC | Age: 59
End: 2019-10-08

## 2019-10-08 NOTE — TELEPHONE ENCOUNTER
pt is having severe sciatica pain. Pain is 7-8 on 10 scale. Pain starts left buttocks down left leg. Numbness & tingling in foot. Has had pain injection with Dr Michelle Noonan last Wednesday, will have  fajorge over the notes.  Has appt with Khalida Marie on 10/11/19

## 2019-10-08 NOTE — TELEPHONE ENCOUNTER
Relapse of symptoms from January. Had steroid  Injection last week and had temporary relief, pain is manageable, he is uncomfortable. He is able to hold out until Friday for his appointment with Astrid Arzate.   He alternating pain medication and muscle relax

## 2019-10-08 NOTE — TELEPHONE ENCOUNTER
rcvd disability benefit claim form to be completed. Has appt on 10/11/19.  Endorsed to RN to put in [address]lder for appt

## 2019-10-09 ENCOUNTER — TELEPHONE (OUTPATIENT)
Dept: SURGERY | Facility: CLINIC | Age: 59
End: 2019-10-09

## 2019-10-11 ENCOUNTER — TELEPHONE (OUTPATIENT)
Dept: SURGERY | Facility: CLINIC | Age: 59
End: 2019-10-11

## 2019-10-11 ENCOUNTER — OFFICE VISIT (OUTPATIENT)
Dept: SURGERY | Facility: CLINIC | Age: 59
End: 2019-10-11
Payer: COMMERCIAL

## 2019-10-11 VITALS — SYSTOLIC BLOOD PRESSURE: 118 MMHG | DIASTOLIC BLOOD PRESSURE: 70 MMHG | HEART RATE: 80 BPM

## 2019-10-11 DIAGNOSIS — M54.42 LEFT-SIDED LOW BACK PAIN WITH LEFT-SIDED SCIATICA, UNSPECIFIED CHRONICITY: Primary | ICD-10-CM

## 2019-10-11 PROCEDURE — 99213 OFFICE O/P EST LOW 20 MIN: CPT | Performed by: PHYSICIAN ASSISTANT

## 2019-10-11 RX ORDER — METHYLPREDNISOLONE 4 MG/1
TABLET ORAL
COMMUNITY
Start: 2019-10-08 | End: 2019-12-09 | Stop reason: ALTCHOICE

## 2019-10-11 NOTE — TELEPHONE ENCOUNTER
Note: patient had page 1 of forms which he needed to complete; pt completed this form during check out post OV. Completed page added to remaining pages on MCKAYLA Leone's desk with MCKAYLA Hannon's help; MCKAYLA Gray already left for the day.

## 2019-10-11 NOTE — TELEPHONE ENCOUNTER
Pt requesting completed forms to be faxed to both his employer & his home fax; home fax #[phone], employer's fax # on forms; per patient, forms given directly to provider during [facility]

## 2019-10-11 NOTE — PROGRESS NOTES
Pt is here for follow up/ Pt was last seen by Dr Harmony Shepherd 1/16/19. Pt was recently seen in the ER on 10/5/19 for left sciatica pain. Epidural injection lumbar: 10/2/19 with Dr Adelina Segura.      Pt states that he was at work and had another flair up where he w

## 2019-10-11 NOTE — PROGRESS NOTES
Neurosurgery Clinic Visit  10/11/2019    Kassie Gregory PCP:  Sneha Olmedo DO    3/22/1960 MRN [id number]     CC:  Back/Left leg pain    HPI:    Jody Zhou is here for f/u. Last seen 9 months ago. 2 months ago he had flare up of left leg pain.   He ha   With mixed depression and anxiety- psychology dr Dunia Banks 3.2017   • Asthma     • Carotid stenosis 7/15/2016   • Depression 1/94/93   • Diastolic dysfunction 1/23/8137   • Diverticula of colon 9/26/11     of sigmoid colon   • Empty sella ([address] 7/1/2 performed.   Neurologic: Alert and Oriented x 3, CN 2-12 GI, Speech Fluent, Negative Romberg, Negative Pronator Drift, Finger-to-Nose Intact, SILT, Gait Normal  Wound well-healed, no erythema, edema, or discharge  M/S:   Cervical Spinous Processes Non-tende

## 2019-10-14 RX ORDER — SEMAGLUTIDE 1.34 MG/ML
INJECTION, SOLUTION SUBCUTANEOUS
Qty: 4.5 ML | Refills: 4 | OUTPATIENT
Start: 2019-10-14

## 2019-10-14 NOTE — TELEPHONE ENCOUNTER
Requested Prescriptions      Name from pharmacy: [address] [place] [address] 0.25/0.5MG 1.5ML 2MG/1.5         Will file in chart as: OZEMPIC, 0.25 OR 0.5 MG/DOSE, 2 MG/1.5ML Subcutaneous Solution Pen-injector    The source prescription was discontinued on 8/20/2019 by

## 2019-10-15 ENCOUNTER — OFFICE VISIT (OUTPATIENT)
Dept: PHYSICAL THERAPY | Age: 59
End: 2019-10-15
Attending: PHYSICIAN ASSISTANT
Payer: COMMERCIAL

## 2019-10-15 ENCOUNTER — TELEPHONE (OUTPATIENT)
Dept: PHYSICAL THERAPY | Age: 59
End: 2019-10-15

## 2019-10-15 DIAGNOSIS — M54.42 LEFT-SIDED LOW BACK PAIN WITH LEFT-SIDED SCIATICA, UNSPECIFIED CHRONICITY: ICD-10-CM

## 2019-10-15 PROCEDURE — 97110 THERAPEUTIC EXERCISES: CPT

## 2019-10-15 PROCEDURE — 97162 PT EVAL MOD COMPLEX 30 MIN: CPT

## 2019-10-15 NOTE — PROGRESS NOTES
DM eye exam received dated 10/15/19 from Dr. Marcos Soriano. Exam shows NO retinopathy. Report has been abstracted.

## 2019-10-16 ENCOUNTER — TELEPHONE (OUTPATIENT)
Dept: SURGERY | Facility: CLINIC | Age: 59
End: 2019-10-16

## 2019-10-18 ENCOUNTER — TELEPHONE (OUTPATIENT)
Dept: SURGERY | Facility: CLINIC | Age: 59
End: 2019-10-18

## 2019-10-18 ENCOUNTER — APPOINTMENT (OUTPATIENT)
Dept: PHYSICAL THERAPY | Age: 59
End: 2019-10-18
Attending: PHYSICIAN ASSISTANT
Payer: COMMERCIAL

## 2019-10-18 DIAGNOSIS — E11.65 UNCONTROLLED TYPE 2 DIABETES MELLITUS WITH HYPERGLYCEMIA, WITHOUT LONG-TERM CURRENT USE OF INSULIN (HCC): ICD-10-CM

## 2019-10-21 ENCOUNTER — TELEPHONE (OUTPATIENT)
Dept: SURGERY | Facility: CLINIC | Age: 59
End: 2019-10-21

## 2019-10-21 NOTE — TELEPHONE ENCOUNTER
Spoke with patient who stated he will call [facility] to see if anything else is needed. Patient will call the office and update us if anything further needs to be done.

## 2019-10-21 NOTE — TELEPHONE ENCOUNTER
Fax from [employer] shows disability and medical leave of absence approved through 10/27/19. Pt states he will need letter or possible OV re: continued disability by Dr Frederick Peace. Please call patient.   Endorsed to [place]

## 2019-10-21 NOTE — TELEPHONE ENCOUNTER
Notice received that patient's disability was approved through 10/27/19. Faxed notice back informing [facility] patient should be off until he is re-evaluated at next appointment 11/15/19.  (Also, attached previously completed disability form and loast off

## 2019-10-22 ENCOUNTER — APPOINTMENT (OUTPATIENT)
Dept: PHYSICAL THERAPY | Age: 59
End: 2019-10-22
Attending: PHYSICIAN ASSISTANT
Payer: COMMERCIAL

## 2019-10-22 RX ORDER — GABAPENTIN 100 MG/1
CAPSULE ORAL
Qty: 90 CAPSULE | Refills: 3 | Status: SHIPPED | OUTPATIENT
Start: 2019-10-22

## 2019-10-22 NOTE — TELEPHONE ENCOUNTER
Requested Prescriptions      Name from pharmacy: GABAPENTIN CAPS 100MG         Will file in chart as: GABAPENTIN 100 MG Oral Cap         Sig: TAKE 1 CAPSULE THREE TIMES A DAY    Disp:  90 capsule    Refills:  12    Start: 10/18/2019    Class: Normal    For

## 2019-10-25 ENCOUNTER — APPOINTMENT (OUTPATIENT)
Dept: PHYSICAL THERAPY | Age: 59
End: 2019-10-25
Attending: PHYSICIAN ASSISTANT
Payer: COMMERCIAL

## 2019-11-09 DIAGNOSIS — F32.A DEPRESSION, UNSPECIFIED DEPRESSION TYPE: ICD-10-CM

## 2019-11-11 RX ORDER — ESCITALOPRAM OXALATE 10 MG/1
TABLET ORAL
Qty: 90 TABLET | Refills: 1 | Status: SHIPPED | OUTPATIENT
Start: 2019-11-11 | End: 2020-05-07

## 2019-11-11 NOTE — TELEPHONE ENCOUNTER
Requesting: escitalopram 10mg  LOV: 8/20/19 for sciatic pain  RTC: -  Last Relevant Labs: 9/30/19 - CMP  Filled: 11/12/18 #90 with 3 refills    Future Appointments   Date Time Provider Trace Carmen   11/13/2019 10:30 AM Rusty Figueroa MD [facility]

## 2019-11-13 ENCOUNTER — OFFICE VISIT (OUTPATIENT)
Dept: SURGERY | Facility: CLINIC | Age: 59
End: 2019-11-13
Payer: COMMERCIAL

## 2019-11-13 ENCOUNTER — TELEPHONE (OUTPATIENT)
Dept: SURGERY | Facility: CLINIC | Age: 59
End: 2019-11-13

## 2019-11-13 DIAGNOSIS — M54.42 LEFT-SIDED LOW BACK PAIN WITH LEFT-SIDED SCIATICA, UNSPECIFIED CHRONICITY: Primary | ICD-10-CM

## 2019-11-13 NOTE — TELEPHONE ENCOUNTER
Paperwork faxed to [facility] at fax #: [phone]. Patient received copy of form at office. Copy sent to scan.

## 2019-11-13 NOTE — PROGRESS NOTES
No visit today. No Charge. Keshia Bedolla just needed paperwork to return to work tomorrow. He is doing well and will call us if he has issues.

## 2019-11-14 ENCOUNTER — TELEPHONE (OUTPATIENT)
Dept: SURGERY | Facility: CLINIC | Age: 59
End: 2019-11-14

## 2019-12-03 ENCOUNTER — TELEPHONE (OUTPATIENT)
Dept: FAMILY MEDICINE CLINIC | Facility: CLINIC | Age: 59
End: 2019-12-03

## 2019-12-03 ENCOUNTER — TELEPHONE (OUTPATIENT)
Dept: SURGERY | Facility: CLINIC | Age: 59
End: 2019-12-03

## 2019-12-03 DIAGNOSIS — E11.65 UNCONTROLLED TYPE 2 DIABETES MELLITUS WITH HYPERGLYCEMIA, WITHOUT LONG-TERM CURRENT USE OF INSULIN (HCC): ICD-10-CM

## 2019-12-03 NOTE — TELEPHONE ENCOUNTER
Spoke with pt, states he received Ozempic from [facility] and the Rx states \"inject 0.25mg weekly\". Advised pt that the last Rx we sent in August was for 0.5mg.  Pt states he is very confused and was shorted 2 injections with the last pen because Exp

## 2019-12-03 NOTE — TELEPHONE ENCOUNTER
[facility] [facility] Discharge notification needing signature- endorsed to Migue Norris PLAUR[address] on 10/11/19 per MCKAYLA Lucas on:    \"A/P:     1. Left-sided low back pain with left-sided sciatica, unspecified chronicity       Donato Flannery is recovering from acute ra

## 2019-12-03 NOTE — TELEPHONE ENCOUNTER
- Pt is calling to discuss rx. Semaglutide (OZEMPIC) 0.25 or 0.5 MG/DOSE Subcutaneous Solution Pen-injector 3 pen 1 8/5/2019     Sig - Route: Inject 0.5 mg into the skin once a week. - Subcutaneous    Sent to pharmacy as: Semaglutide 0.25 or 0. Hourly Rounding/Enhanced Supervision

## 2019-12-04 NOTE — TELEPHONE ENCOUNTER
Spoke with [facility], pharmacy tech states that the last Rx was filled for 0.25mg weekly. Advised pharm tech that the correct dosing is \"0.5mg weekly\". Pharmacy tech states that the last Rx they received was for 0.25mg.  Went over [employer] sent, states

## 2019-12-09 ENCOUNTER — OFFICE VISIT (OUTPATIENT)
Dept: FAMILY MEDICINE CLINIC | Facility: CLINIC | Age: 59
End: 2019-12-09
Payer: COMMERCIAL

## 2019-12-09 VITALS
SYSTOLIC BLOOD PRESSURE: 100 MMHG | RESPIRATION RATE: 16 BRPM | BODY MASS INDEX: 26.39 KG/M2 | HEIGHT: 71 IN | DIASTOLIC BLOOD PRESSURE: 60 MMHG | WEIGHT: 188.5 LBS | HEART RATE: 96 BPM

## 2019-12-09 DIAGNOSIS — R20.8 NUMBNESS OF LEFT FOOT: Primary | ICD-10-CM

## 2019-12-09 DIAGNOSIS — M54.32 SCIATICA, LEFT SIDE: ICD-10-CM

## 2019-12-09 DIAGNOSIS — R05.9 COUGH: ICD-10-CM

## 2019-12-09 PROCEDURE — 99213 OFFICE O/P EST LOW 20 MIN: CPT | Performed by: FAMILY MEDICINE

## 2019-12-09 RX ORDER — ALBUTEROL SULFATE 90 UG/1
2 AEROSOL, METERED RESPIRATORY (INHALATION) EVERY 4 HOURS PRN
Qty: 3 INHALER | Refills: 1 | Status: SHIPPED | OUTPATIENT
Start: 2019-12-09 | End: 2020-06-09

## 2019-12-09 NOTE — PROGRESS NOTES
HPI:   Kailash Rob is a 63year old male that presents for medication follow-up. Sciatica is much better. Had spinal injection which went great.   He felt so good, he got ambitious and tried to lift heavy mattress which flared up his pain, but reso ring             UGI revealed what looks to be a Michael's ring            2014            Colleen gi          Current Outpatient Medications:   •  Albuterol Sulfate HFA (VENTOLIN HFA) 108 (90 Base) MCG/ACT Inhalation Aero Soln, Inhale 2 puffs into the thomas of this encounter: 71\". Weight as of this encounter: 188 lb 8 oz (85.5 kg). Vital signs reviewed. Appears stated age, well groomed. Physical Exam:  GEN:  Patient is alert, awake and oriented, well developed, well nourished, no apparent distress.   SAMEER

## 2020-01-21 NOTE — PROGRESS NOTES
INITIAL EVALUATION:   Referring Physician: Dr. Radha Villeda  Diagnosis: Left-sided low back pain with left-sided sciatica, unspecified chronicity (M54.42)        Date of Service: 10/15/2019     PATIENT SUMMARY/ASSESSMENT   Claudio Covarrubias is a 63year old y/o ma Quality 402: Tobacco Use And Help With Quitting Among Adolescents: Patient screened for tobacco and never smoked Quality 130: Documentation Of Current Medications In The Medical Record: Current Medications Documented for long periods of time, decreased LE flexibility  Clinical Presentation: Evolving   Darryle Bidding describes prior level of function as independent without limitations or significant complaints of pain.  Pt goals include decreasing his complaints of pain and retu Detail Level: Detailed lying with decreased complaints of pain in the left lower extremity and decreased complaints of numbness.  Provided patient education regarding posture and position changes    Charges: PT Eval Moderate complexity x1, TherEx x 1      Total Timed Treatment: 8

## 2020-02-03 ENCOUNTER — OFFICE VISIT (OUTPATIENT)
Dept: FAMILY MEDICINE CLINIC | Facility: CLINIC | Age: 60
End: 2020-02-03
Payer: COMMERCIAL

## 2020-02-03 VITALS
HEIGHT: 71 IN | RESPIRATION RATE: 16 BRPM | BODY MASS INDEX: 25.81 KG/M2 | WEIGHT: 184.38 LBS | TEMPERATURE: 97 F | HEART RATE: 84 BPM | DIASTOLIC BLOOD PRESSURE: 70 MMHG | SYSTOLIC BLOOD PRESSURE: 120 MMHG

## 2020-02-03 DIAGNOSIS — E11.65 UNCONTROLLED TYPE 2 DIABETES MELLITUS WITH HYPERGLYCEMIA, WITHOUT LONG-TERM CURRENT USE OF INSULIN (HCC): Primary | ICD-10-CM

## 2020-02-03 DIAGNOSIS — R05.9 COUGH: ICD-10-CM

## 2020-02-03 DIAGNOSIS — Z00.00 LABORATORY EXAM ORDERED AS PART OF ROUTINE GENERAL MEDICAL EXAMINATION: ICD-10-CM

## 2020-02-03 DIAGNOSIS — E03.9 ACQUIRED HYPOTHYROIDISM: ICD-10-CM

## 2020-02-03 DIAGNOSIS — D50.9 MICROCYTIC ANEMIA: ICD-10-CM

## 2020-02-03 DIAGNOSIS — Z23 NEED FOR VACCINATION: ICD-10-CM

## 2020-02-03 DIAGNOSIS — E78.2 MIXED HYPERLIPIDEMIA: ICD-10-CM

## 2020-02-03 LAB — HEMOGLOBIN A1C: 6.9 % (ref 4.3–5.6)

## 2020-02-03 PROCEDURE — 90750 HZV VACC RECOMBINANT IM: CPT | Performed by: FAMILY MEDICINE

## 2020-02-03 PROCEDURE — 99214 OFFICE O/P EST MOD 30 MIN: CPT | Performed by: FAMILY MEDICINE

## 2020-02-03 PROCEDURE — 90471 IMMUNIZATION ADMIN: CPT | Performed by: FAMILY MEDICINE

## 2020-02-03 RX ORDER — BENZONATATE 100 MG/1
100 CAPSULE ORAL 3 TIMES DAILY PRN
Qty: 30 CAPSULE | Refills: 0 | Status: SHIPPED | OUTPATIENT
Start: 2020-02-03 | End: 2020-06-09 | Stop reason: ALTCHOICE

## 2020-02-03 RX ORDER — AZITHROMYCIN 250 MG/1
TABLET, FILM COATED ORAL
Qty: 6 TABLET | Refills: 0 | Status: SHIPPED | OUTPATIENT
Start: 2020-02-03 | End: 2020-03-03 | Stop reason: ALTCHOICE

## 2020-02-03 NOTE — PROGRESS NOTES
HPI:   Mina Jacobo is a 63year old male that presents for diabetes follow-up. a1c stable on current meds, 6.9. He is UTD on eye and foot exam.  Sugars 120-140s fasting. Denies med side effects.       Patient also presents with a cold symptoms x 1 we Michael's ring            2014            [facility]          Current Outpatient Medications:   •  benzonatate 100 MG Oral Cap, Take 1 capsule (100 mg total) by mouth 3 (three) times daily as needed for cough. , Disp: 30 capsule, Rfl: 0  •  azithromycin (Z negative unless noted in HPI    PHYSICAL EXAM:   /70   Pulse 84   Temp 97.3 °F (36.3 °C) (Temporal)   Resp 16   Ht 71\"   Wt 184 lb 6 oz (83.6 kg)   BMI 25.72 kg/m²  Estimated body mass index is 25.72 kg/m² as calculated from the following:    Height RECOMBINANT IM NJX    4. Mixed hyperlipidemia  - COMP METABOLIC PANEL (14); Future  - LIPID PANEL; Future    5. Acquired hypothyroidism  - TSH W REFLEX TO FREE T4; Future    6. Microcytic anemia  - CBC WITH DIFFERENTIAL WITH PLATELET; Future    7.  Abigail Guardado

## 2020-03-03 ENCOUNTER — OFFICE VISIT (OUTPATIENT)
Dept: FAMILY MEDICINE CLINIC | Facility: CLINIC | Age: 60
End: 2020-03-03
Payer: COMMERCIAL

## 2020-03-03 VITALS
RESPIRATION RATE: 16 BRPM | BODY MASS INDEX: 25.83 KG/M2 | OXYGEN SATURATION: 98 % | WEIGHT: 184.5 LBS | SYSTOLIC BLOOD PRESSURE: 110 MMHG | TEMPERATURE: 98 F | DIASTOLIC BLOOD PRESSURE: 60 MMHG | HEART RATE: 105 BPM | HEIGHT: 71 IN

## 2020-03-03 DIAGNOSIS — J45.21 MILD INTERMITTENT ASTHMA WITH ACUTE EXACERBATION: Primary | ICD-10-CM

## 2020-03-03 PROCEDURE — 99213 OFFICE O/P EST LOW 20 MIN: CPT | Performed by: FAMILY MEDICINE

## 2020-03-03 RX ORDER — MONTELUKAST SODIUM 10 MG/1
10 TABLET ORAL DAILY
Qty: 30 TABLET | Refills: 0 | Status: SHIPPED | OUTPATIENT
Start: 2020-03-03 | End: 2020-06-09

## 2020-03-03 NOTE — PROGRESS NOTES
HPI:   Kumar Small is a 63year old male that presents for chest cold for past month, symptoms mostly resolved but cough is lingering. Took zpak, tessalon, cough drops and albuterol.   Sick contacts: No.  No fever, chills, headache, rash, ear pain, sor mg total) by mouth daily. , Disp: 30 tablet, Rfl: 0  •  benzonatate 100 MG Oral Cap, Take 1 capsule (100 mg total) by mouth 3 (three) times daily as needed for cough. , Disp: 30 capsule, Rfl: 0  •  Albuterol Sulfate HFA (VENTOLIN HFA) 108 (90 Base) MCG/ACT I kg/m²  Estimated body mass index is 25.73 kg/m² as calculated from the following:    Height as of this encounter: 71\". Weight as of this encounter: 184 lb 8 oz (83.7 kg). Vital signs reviewed. Appears stated age, well groomed.   Physical Exam:  GEN:  P

## 2020-03-05 NOTE — PATIENT INSTRUCTIONS
Discharge Instructions for Asthma  You have been diagnosed with an asthma attack.  With the help of your healthcare provider, you can keep your asthma under control and have less emergency department visits and stays in the hospital.    Managing asthma  · rooms with these items. · Have someone bathe your pets every week. And brush them often. If you smoke, do your best to quit. · Enroll in a stop-smoking program to increase your chance of success.   · Ask your healthcare provider about medicines or other

## 2020-03-17 ENCOUNTER — TELEPHONE (OUTPATIENT)
Dept: FAMILY MEDICINE CLINIC | Facility: CLINIC | Age: 60
End: 2020-03-17

## 2020-03-17 RX ORDER — METHYLPREDNISOLONE 4 MG/1
TABLET ORAL
Qty: 1 KIT | Refills: 0 | Status: SHIPPED | OUTPATIENT
Start: 2020-03-17 | End: 2020-06-09 | Stop reason: ALTCHOICE

## 2020-03-17 RX ORDER — DOXYCYCLINE HYCLATE 100 MG
100 TABLET ORAL 2 TIMES DAILY
Qty: 20 TABLET | Refills: 0 | Status: SHIPPED | OUTPATIENT
Start: 2020-03-17 | End: 2020-06-09 | Stop reason: ALTCHOICE

## 2020-03-17 NOTE — TELEPHONE ENCOUNTER
Pt complaining of lingering cough, PND, constantly having to clear his throat, SOB at times. He has been using his inhaler as well. Denies any f/c/n/v/CP.     Pt has not travelled to affected 5 Countries, has not travelled internationally in last 14 days

## 2020-04-01 DIAGNOSIS — E11.65 UNCONTROLLED TYPE 2 DIABETES MELLITUS WITH HYPERGLYCEMIA, WITHOUT LONG-TERM CURRENT USE OF INSULIN (HCC): ICD-10-CM

## 2020-04-02 RX ORDER — CANAGLIFLOZIN 100 MG/1
TABLET, FILM COATED ORAL
Qty: 90 TABLET | Refills: 0 | Status: SHIPPED | OUTPATIENT
Start: 2020-04-02 | End: 2020-06-09

## 2020-04-02 NOTE — TELEPHONE ENCOUNTER
Requested Prescriptions     Pending Prescriptions Disp Refills   • INVOKANA 100 MG Oral Tab [Pharmacy Med Name: INVOKANA TABS 100MG] 90 tablet 3     Sig: TAKE 1 TABLET DAILY     LOV: 3/3/20  Last Relevant Labs: 9/30/20  Filled: 4/8/19 #90 with 3 refills

## 2020-04-26 DIAGNOSIS — I10 ESSENTIAL HYPERTENSION: ICD-10-CM

## 2020-04-26 DIAGNOSIS — E78.2 MIXED HYPERLIPIDEMIA: ICD-10-CM

## 2020-04-27 RX ORDER — LOSARTAN POTASSIUM 50 MG/1
TABLET ORAL
Qty: 90 TABLET | Refills: 0 | Status: SHIPPED | OUTPATIENT
Start: 2020-04-27 | End: 2020-06-09

## 2020-04-27 RX ORDER — ATORVASTATIN CALCIUM 40 MG/1
TABLET, FILM COATED ORAL
Qty: 90 TABLET | Refills: 0 | Status: SHIPPED | OUTPATIENT
Start: 2020-04-27 | End: 2020-06-09

## 2020-04-27 NOTE — TELEPHONE ENCOUNTER
Requested Prescriptions     Pending Prescriptions Disp Refills   • ATORVASTATIN 40 MG Oral Tab [Pharmacy Med Name: ATORVASTATIN TABS 40MG] 90 tablet 3     Sig: TAKE 1 TABLET NIGHTLY   • LOSARTAN POTASSIUM 50 MG Oral Tab [Pharmacy Med Name: Mila Quintero

## 2020-05-06 DIAGNOSIS — F32.A DEPRESSION, UNSPECIFIED DEPRESSION TYPE: ICD-10-CM

## 2020-05-07 RX ORDER — ESCITALOPRAM OXALATE 10 MG/1
TABLET ORAL
Qty: 90 TABLET | Refills: 1 | Status: SHIPPED | OUTPATIENT
Start: 2020-05-07 | End: 2020-12-29

## 2020-05-07 NOTE — TELEPHONE ENCOUNTER
Requested Prescriptions     Pending Prescriptions Disp Refills   • ESCITALOPRAM 10 MG Oral Tab [Pharmacy Med Name: ESCITALOPRAM TABS 10MG] 90 tablet 3     Sig: TAKE 1 TABLET DAILY       LOV: 3/3/20 for acute asthma exacerbation  RTC: if fail to improve  Fi

## 2020-05-13 DIAGNOSIS — E11.65 UNCONTROLLED TYPE 2 DIABETES MELLITUS WITH HYPERGLYCEMIA, WITHOUT LONG-TERM CURRENT USE OF INSULIN (HCC): ICD-10-CM

## 2020-05-14 ENCOUNTER — VIRTUAL PHONE E/M (OUTPATIENT)
Dept: FAMILY MEDICINE CLINIC | Facility: CLINIC | Age: 60
End: 2020-05-14
Payer: COMMERCIAL

## 2020-05-14 ENCOUNTER — TELEPHONE (OUTPATIENT)
Dept: FAMILY MEDICINE CLINIC | Facility: CLINIC | Age: 60
End: 2020-05-14

## 2020-05-14 DIAGNOSIS — Z02.89 ENCOUNTER FOR COMPLETION OF FORM WITH PATIENT: ICD-10-CM

## 2020-05-14 DIAGNOSIS — J30.2 SEASONAL ALLERGIES: Primary | ICD-10-CM

## 2020-05-14 PROCEDURE — 99213 OFFICE O/P EST LOW 20 MIN: CPT | Performed by: FAMILY MEDICINE

## 2020-05-14 RX ORDER — OMEPRAZOLE 20 MG/1
CAPSULE, DELAYED RELEASE ORAL
Qty: 90 CAPSULE | Refills: 0 | Status: SHIPPED | OUTPATIENT
Start: 2020-05-14 | End: 2020-06-09

## 2020-05-14 NOTE — TELEPHONE ENCOUNTER
Future Appointments   Date Time Provider Trace Carmen   5/14/2020  4:30 PM Maria Luisa Martin DO [facility] [address]     Patient verbally consents to a virtual/telephone check-in service for the date and time noted above.  Patient understands and accept

## 2020-05-14 NOTE — PROGRESS NOTES
Virtual/Telephone Check-In    Rosi Rogers verbally consents to a Virtual/Telephone Check-In service on 05/14/20. Patient understands and accepts financial responsibility for any deductible, co-insurance and/or co-pays associated with this service. insulin use (HCC)      GERD (gastroesophageal reflux disease)      Mild intermittent asthma without complication      Diverticula of colon            of sigmoid colon      Empty sella (HCC)            fluid within th pituitary gland consistent with Inject 0.5 mg into the skin once a week., Disp: 3 pen, Rfl: 1  •  GABAPENTIN 100 MG Oral Cap, TAKE 1 CAPSULE THREE TIMES A DAY, Disp: 90 capsule, Rfl: 3  •  Cyclobenzaprine HCl 10 MG Oral Tab, Take 1 tablet (10 mg total) by mouth nightly as needed for Musc

## 2020-05-14 NOTE — TELEPHONE ENCOUNTER
Requested Prescriptions     Pending Prescriptions Disp Refills   • METFORMIN HCL 1000 MG Oral Tab [Pharmacy Med Name: METFORMIN HCL TABS 1000MG] 180 tablet 3     Sig: TAKE 1 TABLET TWICE A DAY WITH MEALS   • OMEPRAZOLE 20 MG Oral Capsule Delayed Release [P

## 2020-06-09 ENCOUNTER — OFFICE VISIT (OUTPATIENT)
Dept: FAMILY MEDICINE CLINIC | Facility: CLINIC | Age: 60
End: 2020-06-09
Payer: COMMERCIAL

## 2020-06-09 ENCOUNTER — PATIENT MESSAGE (OUTPATIENT)
Dept: FAMILY MEDICINE CLINIC | Facility: CLINIC | Age: 60
End: 2020-06-09

## 2020-06-09 VITALS
OXYGEN SATURATION: 99 % | HEIGHT: 71 IN | BODY MASS INDEX: 26.32 KG/M2 | HEART RATE: 93 BPM | TEMPERATURE: 97 F | SYSTOLIC BLOOD PRESSURE: 122 MMHG | RESPIRATION RATE: 16 BRPM | WEIGHT: 188 LBS | DIASTOLIC BLOOD PRESSURE: 60 MMHG

## 2020-06-09 DIAGNOSIS — Z00.00 ANNUAL PHYSICAL EXAM: Primary | ICD-10-CM

## 2020-06-09 DIAGNOSIS — E11.9 TYPE 2 DIABETES MELLITUS WITHOUT COMPLICATION, WITHOUT LONG-TERM CURRENT USE OF INSULIN (HCC): ICD-10-CM

## 2020-06-09 DIAGNOSIS — K29.50 CHRONIC GASTRITIS, PRESENCE OF BLEEDING UNSPECIFIED, UNSPECIFIED GASTRITIS TYPE: ICD-10-CM

## 2020-06-09 DIAGNOSIS — J45.20 MILD INTERMITTENT ASTHMA WITHOUT COMPLICATION: ICD-10-CM

## 2020-06-09 DIAGNOSIS — J30.2 SEASONAL ALLERGIES: ICD-10-CM

## 2020-06-09 DIAGNOSIS — Z13.31 NEGATIVE DEPRESSION SCREENING: ICD-10-CM

## 2020-06-09 DIAGNOSIS — E78.2 MIXED HYPERLIPIDEMIA: ICD-10-CM

## 2020-06-09 DIAGNOSIS — I10 ESSENTIAL HYPERTENSION: ICD-10-CM

## 2020-06-09 DIAGNOSIS — Z23 NEED FOR VACCINATION: ICD-10-CM

## 2020-06-09 PROBLEM — E11.65 UNCONTROLLED TYPE 2 DIABETES MELLITUS WITH HYPERGLYCEMIA, WITHOUT LONG-TERM CURRENT USE OF INSULIN (HCC): Status: RESOLVED | Noted: 2018-10-23 | Resolved: 2020-06-09

## 2020-06-09 PROCEDURE — 99396 PREV VISIT EST AGE 40-64: CPT | Performed by: FAMILY MEDICINE

## 2020-06-09 PROCEDURE — 99214 OFFICE O/P EST MOD 30 MIN: CPT | Performed by: FAMILY MEDICINE

## 2020-06-09 PROCEDURE — 90471 IMMUNIZATION ADMIN: CPT | Performed by: FAMILY MEDICINE

## 2020-06-09 PROCEDURE — 90472 IMMUNIZATION ADMIN EACH ADD: CPT | Performed by: FAMILY MEDICINE

## 2020-06-09 PROCEDURE — 90715 TDAP VACCINE 7 YRS/> IM: CPT | Performed by: FAMILY MEDICINE

## 2020-06-09 RX ORDER — OMEPRAZOLE 20 MG/1
20 CAPSULE, DELAYED RELEASE ORAL DAILY
Qty: 90 CAPSULE | Refills: 1 | Status: SHIPPED | OUTPATIENT
Start: 2020-06-09 | End: 2021-01-18

## 2020-06-09 RX ORDER — FLUTICASONE PROPIONATE 50 MCG
2 SPRAY, SUSPENSION (ML) NASAL DAILY
COMMUNITY
End: 2020-06-09

## 2020-06-09 RX ORDER — ALBUTEROL SULFATE 90 UG/1
2 AEROSOL, METERED RESPIRATORY (INHALATION) EVERY 4 HOURS PRN
Qty: 3 INHALER | Refills: 1 | Status: SHIPPED | OUTPATIENT
Start: 2020-06-09

## 2020-06-09 RX ORDER — FLUTICASONE PROPIONATE 50 MCG
2 SPRAY, SUSPENSION (ML) NASAL DAILY
Qty: 3 BOTTLE | Refills: 3 | Status: SHIPPED | OUTPATIENT
Start: 2020-06-09

## 2020-06-09 RX ORDER — ATORVASTATIN CALCIUM 40 MG/1
40 TABLET, FILM COATED ORAL NIGHTLY
Qty: 90 TABLET | Refills: 1 | Status: SHIPPED | OUTPATIENT
Start: 2020-06-09 | End: 2021-03-11

## 2020-06-09 RX ORDER — LOSARTAN POTASSIUM 50 MG/1
50 TABLET ORAL DAILY
Qty: 90 TABLET | Refills: 1 | Status: SHIPPED | OUTPATIENT
Start: 2020-06-09 | End: 2021-02-03

## 2020-06-09 NOTE — TELEPHONE ENCOUNTER
From: Laura Alcala  To: Jose Giron DO  Sent: 6/9/2020 11:50 AM CDT  Subject: Prescription Question    I called [facility] and the prescriptions never got sent to them today?

## 2020-06-09 NOTE — PROGRESS NOTES
Janae Ambriz is a 62year old male that presents for annual physical exam.     Diet and exercise: walking, diabetic diet   STI testing desired: No  Colonoscopy: Colonoscopy due on 09/26/2021   PSA: PSA due on 09/30/2021  PHQ2: 0 CSSR: Neg  Vaccines: due benign             process      Depression      Schatzki's ring             UGI revealed what looks to be a Schatzki's ring            [facility]          Current Outpatient Medications:   •  Fluticasone Propionate 50 MCG/ACT Nasal Suspensi (Patient not taking: Reported on 6/9/2020 ), Disp: 30 tablet, Rfl: 1    Past Medical History:   Diagnosis Date   • Acquired hypothyroidism 4/7/2016   • Adjustment disorder 3/14/2017    With mixed depression and anxiety- psychology dr Lizeth Mcintosh 3.2017   • Sexual Activity      Alcohol use:  Yes        Alcohol/week: 0.0 standard drinks        Frequency: Monthly or less        Drinks per session: 1 or 2      Drug use: No      Sexual activity: Not on file    Lifestyle      Physical activity:        Days per week denies thyroid history  ALL/ASTHMA: + hx of allergy or asthma    EXAM:   /60   Pulse 93   Temp 96.7 °F (35.9 °C) (Tympanic)   Resp 16   Ht 71\"   Wt 188 lb (85.3 kg)   SpO2 99%   BMI 26.22 kg/m²  Estimated body mass index is 26.22 kg/m² as calculated Refill: 1  - Semaglutide,0.25 or 0.5MG/DOS, (OZEMPIC, 0.25 OR 0.5 MG/DOSE,) 2 MG/1.5ML Subcutaneous Solution Pen-injector; Inject 0.5 mg into the skin once a week. Dispense: 3 pen; Refill: 1  - stable on current meds, labs pending    5.  Mixed hyperlipidem

## 2020-06-17 ENCOUNTER — TELEPHONE (OUTPATIENT)
Dept: FAMILY MEDICINE CLINIC | Facility: CLINIC | Age: 60
End: 2020-06-17

## 2020-06-17 NOTE — TELEPHONE ENCOUNTER
Pt will need a note for his employer [employer]) that he is able to wear a mask of his choosing. He currently wears an N95 with air breathers at the sides.  He needs the note to say that it has been approved by the doctor and that it is due to his

## 2020-06-22 NOTE — TELEPHONE ENCOUNTER
D/w risk management and at this time, we are not writing notes for changes to employer recommended PPE. The employee needs to work this out with their HR.   They can request their records to confirm they have a certain medical condition but otherwise we ca

## 2020-06-24 ENCOUNTER — LAB ENCOUNTER (OUTPATIENT)
Dept: LAB | Age: 60
End: 2020-06-24
Attending: FAMILY MEDICINE
Payer: COMMERCIAL

## 2020-06-24 DIAGNOSIS — Z00.00 LABORATORY EXAM ORDERED AS PART OF ROUTINE GENERAL MEDICAL EXAMINATION: ICD-10-CM

## 2020-06-24 DIAGNOSIS — E03.9 ACQUIRED HYPOTHYROIDISM: ICD-10-CM

## 2020-06-24 DIAGNOSIS — D50.9 MICROCYTIC ANEMIA: ICD-10-CM

## 2020-06-24 DIAGNOSIS — E78.2 MIXED HYPERLIPIDEMIA: ICD-10-CM

## 2020-06-24 PROCEDURE — 80061 LIPID PANEL: CPT

## 2020-06-24 PROCEDURE — 36415 COLL VENOUS BLD VENIPUNCTURE: CPT

## 2020-06-24 PROCEDURE — 80053 COMPREHEN METABOLIC PANEL: CPT

## 2020-06-24 PROCEDURE — 84443 ASSAY THYROID STIM HORMONE: CPT

## 2020-06-24 PROCEDURE — 85025 COMPLETE CBC W/AUTO DIFF WBC: CPT

## 2020-07-22 ENCOUNTER — TELEPHONE (OUTPATIENT)
Dept: FAMILY MEDICINE CLINIC | Facility: CLINIC | Age: 60
End: 2020-07-22

## 2020-07-23 ENCOUNTER — VIRTUAL PHONE E/M (OUTPATIENT)
Dept: FAMILY MEDICINE CLINIC | Facility: CLINIC | Age: 60
End: 2020-07-23
Payer: COMMERCIAL

## 2020-07-23 DIAGNOSIS — Z01.84 IMMUNITY STATUS TESTING: Primary | ICD-10-CM

## 2020-07-23 PROCEDURE — 99213 OFFICE O/P EST LOW 20 MIN: CPT | Performed by: FAMILY MEDICINE

## 2020-07-23 NOTE — PATIENT INSTRUCTIONS
Coronavirus Disease 2019 (COVID-19): Prevention  The best prevention is to have no contact with the SARS-CoV-2 virus. There is no vaccine yet. Canceling travel and other outings  Stay informed about COVID-19 in your area.  Follow local instructions about · Clean frequently-touched home surfaces often with disinfectant. This includes desk surfaces, printers, phones, kitchen counters, tables, fridge door handle, bathroom surfaces, and any soiled surface. Closely follow disinfectant label instructions.  Go to · If you feel sick in any way, go home and stay home. · Tell your supervisor if you are well but live with someone who has COVID-19. · Stay at least 6 feet away from all people. · Don't shake hands with anyone.   · Don't attend in-person meetings, or horne If you have been exposed to a person with COVID-19  If you've been exposed within that last 14 days to someone who is suspected of having COVID-19 or has tested positive for it:  · Call your healthcare provider and follow all instructions.  Your activities

## 2020-07-23 NOTE — PROGRESS NOTES
Virtual/Telephone Check-In    Jie Miranda verbally consents to a Virtual/Telephone Check-In service on 07/23/20. Patient understands and accepts financial responsibility for any deductible, co-insurance and/or co-pays associated with this service. Medications:   •  Semaglutide, 1 MG/DOSE, (OZEMPIC, 1 MG/DOSE,) 2 MG/1.5ML Subcutaneous Solution Pen-injector, Inject 1 mg into the skin once a week., Disp: 6 pen, Rfl: 1  •  Fluticasone Propionate 50 MCG/ACT Nasal Suspension, 2 sprays by Each Nare route d denies any dry mouth, no audible congestion in voice, cannot feel any enlarged cervical lymph nodes, can move neck in all directions without pain  RESP: no cough noted, no hoarseness, no audible wheezing  NEURO: A&OX3, mood and behavior appropriate, able t

## 2020-07-24 ENCOUNTER — APPOINTMENT (OUTPATIENT)
Dept: LAB | Age: 60
End: 2020-07-24
Attending: FAMILY MEDICINE
Payer: COMMERCIAL

## 2020-07-24 DIAGNOSIS — Z01.84 IMMUNITY STATUS TESTING: ICD-10-CM

## 2020-07-24 LAB — SARS-COV-2 IGG SERPLBLD QL IA.RAPID: NEGATIVE

## 2020-07-24 PROCEDURE — 36415 COLL VENOUS BLD VENIPUNCTURE: CPT

## 2020-07-24 PROCEDURE — 86769 SARS-COV-2 COVID-19 ANTIBODY: CPT

## 2020-08-06 ENCOUNTER — TELEPHONE (OUTPATIENT)
Dept: FAMILY MEDICINE CLINIC | Facility: CLINIC | Age: 60
End: 2020-08-06

## 2020-08-06 NOTE — TELEPHONE ENCOUNTER
Patient states his Ozempic was increased by his specialist and now he has been nauseated for a week. He also had been using the hand  that got recalled, maybe it's that, he would like to speak to a RN, please advise.

## 2020-08-06 NOTE — TELEPHONE ENCOUNTER
Pt states he increased his Ozempic per his specialist last week and has been feeling nauseated for the last week. Pt states he is concerned about whether it is the Ozempic or the fact that he had been using a hand  that has been recalled.   Inform

## 2020-09-23 ENCOUNTER — VIRTUAL PHONE E/M (OUTPATIENT)
Dept: FAMILY MEDICINE CLINIC | Facility: CLINIC | Age: 60
End: 2020-09-23
Payer: COMMERCIAL

## 2020-09-23 DIAGNOSIS — Z71.1 WORRIED WELL: Primary | ICD-10-CM

## 2020-09-23 PROCEDURE — 99213 OFFICE O/P EST LOW 20 MIN: CPT | Performed by: FAMILY MEDICINE

## 2020-09-23 NOTE — PROGRESS NOTES
Virtual/Telephone Check-In    Sherrill Burrell verbally consents to a Virtual/Telephone Check-In service on 09/23/20. Patient understands and accepts financial responsibility for any deductible, co-insurance and/or co-pays associated with this service. fluid within th pituitary gland consistent with            empty sella, a benign             process      Depression      Schatzki's ring             UGI revealed what looks to be a Schatzki's ring            2014            [facility]          Current mg by mouth daily. , Disp: , Rfl:     REVIEW OF SYSTEMS:     Comprehensive ROS negative unless noted in HPI    PHYSICAL EXAM:     GEN:  Patient is alert, awake and oriented, in no apparent distress, speaking in full sentences  SKIN: pt denies any rash  HEEN

## 2020-12-08 ENCOUNTER — TELEMEDICINE (OUTPATIENT)
Dept: FAMILY MEDICINE CLINIC | Facility: CLINIC | Age: 60
End: 2020-12-08
Payer: COMMERCIAL

## 2020-12-08 DIAGNOSIS — Z20.822 EXPOSURE TO COVID-19 VIRUS: Primary | ICD-10-CM

## 2020-12-08 PROCEDURE — 99213 OFFICE O/P EST LOW 20 MIN: CPT | Performed by: FAMILY MEDICINE

## 2020-12-08 NOTE — PROGRESS NOTES
Video Visit    Remona Min verbally consents to a Video Visit service on 12/08/20. Patient understands and accepts financial responsibility for any deductible, co-insurance and/or co-pays associated with this service.     Duration of the service: 5 m a benign             process      Depression      Schatzki's ring             UGI revealed what looks to be a Schatzki's ring            2014            [facility]          Current Outpatient Medications:   •  Aspirin-Calcium Carbonate  MG Oral Tab, Tab, Take 81 mg by mouth daily. , Disp: , Rfl:     REVIEW OF SYSTEMS:     Comprehensive ROS negative unless noted in HPI    PHYSICAL EXAM:     GEN:  Patient is alert, awake and oriented, in no apparent distress, speaking in full sentences  SKIN: pt denies a was spent on reviewing labs, medications, radiology tests and decision making. Appropriate medical decision-making and tests are ordered as detailed in the plan of care above.

## 2020-12-29 DIAGNOSIS — F32.A DEPRESSION, UNSPECIFIED DEPRESSION TYPE: ICD-10-CM

## 2020-12-29 DIAGNOSIS — E11.9 TYPE 2 DIABETES MELLITUS WITHOUT COMPLICATION, WITHOUT LONG-TERM CURRENT USE OF INSULIN (HCC): Primary | ICD-10-CM

## 2020-12-29 DIAGNOSIS — Z12.5 PROSTATE CANCER SCREENING: ICD-10-CM

## 2020-12-29 RX ORDER — ESCITALOPRAM OXALATE 10 MG/1
10 TABLET ORAL DAILY
Qty: 90 TABLET | Refills: 0 | Status: SHIPPED | OUTPATIENT
Start: 2020-12-29 | End: 2021-03-11

## 2020-12-29 NOTE — TELEPHONE ENCOUNTER
Name from pharmacy: ESCITALOPRAM TABS 10MG          Will file in chart as: ESCITALOPRAM 10 MG Oral Tab    Sig: TAKE 1 TABLET DAILY    Disp:  90 tablet    Refills:  3    Start: 12/29/2020    Class: Normal    Non-formulary For: Depression, unspecified depres

## 2021-01-16 DIAGNOSIS — K29.50 CHRONIC GASTRITIS, PRESENCE OF BLEEDING UNSPECIFIED, UNSPECIFIED GASTRITIS TYPE: ICD-10-CM

## 2021-01-16 DIAGNOSIS — E11.9 TYPE 2 DIABETES MELLITUS WITHOUT COMPLICATION, WITHOUT LONG-TERM CURRENT USE OF INSULIN (HCC): ICD-10-CM

## 2021-01-18 RX ORDER — OMEPRAZOLE 20 MG/1
CAPSULE, DELAYED RELEASE ORAL
Qty: 90 CAPSULE | Refills: 3 | Status: SHIPPED | OUTPATIENT
Start: 2021-01-18 | End: 2022-01-11

## 2021-01-18 NOTE — TELEPHONE ENCOUNTER
Requested Prescriptions     Name from pharmacy: METFORMIN HCL TABS 1000MG         Will file in chart as: METFORMIN HCL 1000 MG Oral Tab    Sig: TAKE 1 TABLET TWICE A DAY WITH MEALS    Disp:  180 tablet    Refills:  3    Start: 1/16/2021    Class: Normal

## 2021-01-27 ENCOUNTER — TELEPHONE (OUTPATIENT)
Dept: FAMILY MEDICINE CLINIC | Facility: CLINIC | Age: 61
End: 2021-01-27

## 2021-01-27 ENCOUNTER — TELEMEDICINE (OUTPATIENT)
Dept: FAMILY MEDICINE CLINIC | Facility: CLINIC | Age: 61
End: 2021-01-27

## 2021-01-27 DIAGNOSIS — R20.8 NUMBNESS OF LEFT FOOT: Primary | ICD-10-CM

## 2021-01-27 PROCEDURE — 99214 OFFICE O/P EST MOD 30 MIN: CPT | Performed by: FAMILY MEDICINE

## 2021-01-27 RX ORDER — METHYLPREDNISOLONE 4 MG/1
TABLET ORAL
Qty: 1 KIT | Refills: 0 | Status: SHIPPED | OUTPATIENT
Start: 2021-01-27 | End: 2021-03-11

## 2021-01-27 NOTE — PROGRESS NOTES
Video Visit    Nicolasa Barbosa verbally consents to a Video Visit service on 01/27/21. Patient understands and accepts financial responsibility for any deductible, co-insurance and/or co-pays associated with this service.     Duration of the service: 10 Kim Watkins          Current Outpatient Medications:   •  methylPREDNISolone (MEDROL) 4 MG Oral Tablet Therapy Pack, As directed., Disp: 1 kit, Rfl: 0  •  OZEMPIC, 1 MG/DOSE, 2 MG/1.5ML Subcutaneous Solution Pen-injector, INJECT 1 MG UNDER THE SKIN ONCE A W HPI    PHYSICAL EXAM:     GEN:  Patient is alert, awake and oriented, in no apparent distress, speaking in full sentences  SKIN: pt denies any rash  HEENT: can move neck in all directions without pain  RESP: no cough noted  NEURO: A&OX3, mood and behavior

## 2021-01-27 NOTE — PATIENT INSTRUCTIONS
Please schedule MRI of spine and start steroid (medrol) dose pack.     Avoid heavy lifting over 10 lbs until imaging results

## 2021-01-27 NOTE — TELEPHONE ENCOUNTER
Future Appointments   Date Time Provider Trace Carmen   1/27/2021 10:45 AM Mynor Minors, DO [facility] [address] [facility] [address]   4/13/2021  9:00 AM Suzie Hicks NP [facility]     Patient verbally consents to a virtual/telephone check-in service for the

## 2021-01-29 ENCOUNTER — TELEPHONE (OUTPATIENT)
Dept: FAMILY MEDICINE CLINIC | Facility: CLINIC | Age: 61
End: 2021-01-29

## 2021-01-29 NOTE — TELEPHONE ENCOUNTER
MRI spine is typically without contrast. What issue did he have? Ok to call radiology and change order if needed.     Preston Angela, DO  Family Medicine

## 2021-01-29 NOTE — TELEPHONE ENCOUNTER
MRI SPINE LUMBAR (CPT=72148) (Order #[id number]) on 1/27/21    · Pt is calling bc he noticed this order was put in without contrast.  · Pt states the last time he had an MRI it was ordered without contrast and he had issues and it needed to be with contrast

## 2021-02-02 ENCOUNTER — HOSPITAL ENCOUNTER (OUTPATIENT)
Dept: MRI IMAGING | Age: 61
Discharge: HOME | End: 2021-02-02
Attending: FAMILY MEDICINE
Payer: COMMERCIAL

## 2021-02-02 DIAGNOSIS — R20.8 NUMBNESS OF LEFT FOOT: ICD-10-CM

## 2021-02-02 DIAGNOSIS — E11.9 TYPE 2 DIABETES MELLITUS WITHOUT COMPLICATION, WITHOUT LONG-TERM CURRENT USE OF INSULIN (HCC): ICD-10-CM

## 2021-02-02 DIAGNOSIS — I10 ESSENTIAL HYPERTENSION: ICD-10-CM

## 2021-02-02 PROCEDURE — 72148 MRI LUMBAR SPINE W/O DYE: CPT | Performed by: FAMILY MEDICINE

## 2021-02-03 RX ORDER — LOSARTAN POTASSIUM 50 MG/1
TABLET ORAL
Qty: 90 TABLET | Refills: 3 | Status: SHIPPED | OUTPATIENT
Start: 2021-02-03 | End: 2022-01-28

## 2021-02-03 RX ORDER — CANAGLIFLOZIN 100 MG/1
TABLET, FILM COATED ORAL
Qty: 90 TABLET | Refills: 3 | Status: SHIPPED | OUTPATIENT
Start: 2021-02-03 | End: 2022-01-06

## 2021-02-03 NOTE — TELEPHONE ENCOUNTER
Name from pharmacy: [facility] TABS 100MG          Will file in chart as: INVOKANA 100 MG Oral Tab    Sig: TAKE 1 TABLET DAILY    Disp:  90 tablet    Refills:  3    Start: 2/2/2021    Class: Normal    Non-formulary For: Type 2 diabetes mellitus without compl

## 2021-02-11 DIAGNOSIS — Z23 NEED FOR VACCINATION: ICD-10-CM

## 2021-02-12 ENCOUNTER — TELEPHONE (OUTPATIENT)
Dept: FAMILY MEDICINE CLINIC | Facility: CLINIC | Age: 61
End: 2021-02-12

## 2021-02-12 NOTE — TELEPHONE ENCOUNTER
Future Appointments   Date Time Provider Trace Nella   2/13/2021  7:40 AM Sue Ross MD [facility] [address] [facility] [address]   2/16/2021 10:00 AM [facility] COVID VACCINE RESOURCE [facility] COVID[facility]AC [place]   4/13/2021  9:00 AM Gerri Carrel, NP [facility]

## 2021-02-16 ENCOUNTER — IMMUNIZATION (OUTPATIENT)
Dept: LAB | Age: 61
End: 2021-02-16
Attending: HOSPITALIST
Payer: COMMERCIAL

## 2021-02-16 DIAGNOSIS — Z23 NEED FOR VACCINATION: Primary | ICD-10-CM

## 2021-02-16 PROCEDURE — 0001A SARSCOV2 VAC 30MCG/0.3ML IM: CPT

## 2021-02-17 ENCOUNTER — TELEPHONE (OUTPATIENT)
Dept: FAMILY MEDICINE CLINIC | Facility: CLINIC | Age: 61
End: 2021-02-17

## 2021-02-17 ENCOUNTER — TELEMEDICINE (OUTPATIENT)
Dept: FAMILY MEDICINE CLINIC | Facility: CLINIC | Age: 61
End: 2021-02-17

## 2021-02-17 ENCOUNTER — PATIENT MESSAGE (OUTPATIENT)
Dept: FAMILY MEDICINE CLINIC | Facility: CLINIC | Age: 61
End: 2021-02-17

## 2021-02-17 DIAGNOSIS — Z20.822 EXPOSURE TO COVID-19 VIRUS: Primary | ICD-10-CM

## 2021-02-17 NOTE — TELEPHONE ENCOUNTER
From: Agus Hudson  To: Lanesboromelba Kruegr DO  Sent: 2/17/2021 11:43 AM CST  Subject: Non-Urgent Medical Question    Hello,  I was exposed to my dentist week ago from last Monday 1-8-2021 and needed to follow my quarantine for 10 days and or get a negativ

## 2021-02-17 NOTE — PATIENT INSTRUCTIONS
Coronavirus Disease 2019 (COVID-19): Prevention  The best prevention is to have no contact with the SARS-CoV-2 virus. There is no vaccine yet. Canceling travel and other outings  Stay informed about COVID-19 in your area.  Follow local instructions about b · Clean frequently-touched home surfaces often with disinfectant. This includes desk surfaces, printers, phones, kitchen counters, tables, fridge door handle, bathroom surfaces, and any soiled surface. Closely follow disinfectant label instructions.  Go to · If you feel sick in any way, go home and stay home. · Tell your supervisor if you are well but live with someone who has COVID-19. · Stay at least 6 feet away from all people. · Don't shake hands with anyone.   · Don't attend in-person meetings, or horne If you have been exposed to a person with COVID-19  If you've been exposed within that last 14 days to someone who is suspected of having COVID-19 or has tested positive for it:  · Call your healthcare provider and follow all instructions.  Your activities

## 2021-02-17 NOTE — PROGRESS NOTES
Negative covid test and completed 10 days of quarantine with no symptoms after exposure. Ok to RTW. Note given. Doing well after first dose of Pfizer vaccine this week. No charge.       Lisa Duff,   Family Medicine

## 2021-02-25 ENCOUNTER — TELEMEDICINE (OUTPATIENT)
Dept: FAMILY MEDICINE CLINIC | Facility: CLINIC | Age: 61
End: 2021-02-25

## 2021-02-25 DIAGNOSIS — R05.9 COUGH: Primary | ICD-10-CM

## 2021-02-25 PROCEDURE — 99213 OFFICE O/P EST LOW 20 MIN: CPT | Performed by: PHYSICIAN ASSISTANT

## 2021-02-25 RX ORDER — BENZONATATE 200 MG/1
200 CAPSULE ORAL 3 TIMES DAILY PRN
Qty: 30 CAPSULE | Refills: 0 | Status: SHIPPED | OUTPATIENT
Start: 2021-02-25 | End: 2021-03-11

## 2021-02-25 NOTE — PATIENT INSTRUCTIONS
Patient Isolation Advice:  • Those with mild symptoms*, are presumed to have COVID unless they test negative and have been cleared by their physician and / or an alternate diagnosis to explain their symptoms has been established.   • Those with mild symptom for asymptomatic patients, 2 days prior to test specimen collection. This also includes any direct physical contact for less than 10 min, of a COVID positive individual for caregiving activities.     What to do if you are sick with coronavirus disease 2019 please do not take aspirin if you have a history of ulcers, Crohn's disease, or Ulcerative Colitis. 6. Famotidine (trade name Pepcid): 20 to 40 mg daily for 28 days.     Call ahead before visiting your doctor  If you have a medical appointment, call the he touching your eyes, nose, and mouth with unwashed hands. Clean all \"high-touch\" surfaces every day  High touch surfaces include counters, tabletops, doorknobs, bathroom fixtures, toilets, phones, keyboards, tablets, and beside tables.  Also, clean any your health at home:  If you have possible or confirmed COVID-19:  • Stay home from work, school, and away from other public places. If you must go out, avoid using any kind of public transportation, ridesharing, or taxis.   • Monitor your symptoms carefull in the same household as a COVID-19 positive patient can consider these 5 interventions. These interventions are only recommended for 1-2 months to get past surge periods of time or high risk situations. 1. Aspirin 81 mg daily  2.  Vitamin D 2,000 IU kenya

## 2021-02-25 NOTE — PROGRESS NOTES
This visit is conducted using Telemedicine with live, interactive video and audio. Patient has been referred to the [facility] website at [email or web address] to review the yearly Consent to Treat document.     Patient understands and accepts financial res Disp: 90 tablet, Rfl: 0    •  Aspirin-Calcium Carbonate  MG Oral Tab, Take 81 mg by mouth., Disp: , Rfl:     •  EPINEPHrine (EPIPEN 2-SELVIN) 0.3 MG/0.3ML Injection Solution Auto-injector, Take as directed--brand or generic ok, Disp: 2 each, Rfl: 0    • distress. Pulmonary/Chest:   No increased effort or work with breathing   Neurological: He is alert and oriented to person, place, and time. Speaking fluently and in clear sentences   Skin: No rash noted. No erythema.    On visible skin   Psychiatric: M higher), chills, dry cough, tiredness; (less common) aches and pains, sore throat, congestion or runny nose, diarrhea, conjunctivitis, headache, loss of taste or smell, skin rash or discoloration of fingers or toes.     **Severe symptoms include: Difficulty taking.)  2. Vitamin D: 5,000 International Units (IU) daily for 28 days. 3. Vitamin C: 500 mg twice daily for 28 days. (Do not exceed 2,000 mg per day.)  4. Iron – Ferrous Sulfate: 325 mg daily for 28 days.  (You may add Colace 100 mg daily as needed for drink glasses, cups, eating utensils, towels, or bedding with other people or pets in your home. After using these items, they should be washed thoroughly with soap and water.     Clean your hands often  Wash your hands often with soap and water for at leas notify the dispatch personnel that you have, or are being evaluated for COVID-19. If possible, put on a facemask before emergency medical services personnel arrive.     Discontinuing home isolation  Patients with confirmed COVID-19 should remain under home only for adult patients (18 and older) who are not currently pregnant or breastfeeding.    Low risk adult patients   No high risk medical conditions as defined by the CDC: [employer]si- done. Outcome: Patient verbalizes understanding.     Problem List:  Patient Active Problem List:     BELLA on CPAP     Type 2 diabetes mellitus without complication, unspecified whether long term insulin use (HCC)     GERD (gastroesophageal reflux disease)

## 2021-03-09 ENCOUNTER — IMMUNIZATION (OUTPATIENT)
Dept: LAB | Age: 61
End: 2021-03-09
Attending: HOSPITALIST
Payer: COMMERCIAL

## 2021-03-09 DIAGNOSIS — Z23 NEED FOR VACCINATION: Primary | ICD-10-CM

## 2021-03-09 PROCEDURE — 0002A SARSCOV2 VAC 30MCG/0.3ML IM: CPT

## 2021-03-11 DIAGNOSIS — E78.2 MIXED HYPERLIPIDEMIA: ICD-10-CM

## 2021-03-11 DIAGNOSIS — F32.A DEPRESSION, UNSPECIFIED DEPRESSION TYPE: ICD-10-CM

## 2021-03-11 RX ORDER — ESCITALOPRAM OXALATE 10 MG/1
TABLET ORAL
Qty: 90 TABLET | Refills: 2 | Status: SHIPPED | OUTPATIENT
Start: 2021-03-11 | End: 2021-11-15

## 2021-03-11 RX ORDER — ATORVASTATIN CALCIUM 40 MG/1
40 TABLET, FILM COATED ORAL NIGHTLY
Qty: 90 TABLET | Refills: 2 | Status: SHIPPED | OUTPATIENT
Start: 2021-03-11 | End: 2021-11-15

## 2021-03-11 NOTE — TELEPHONE ENCOUNTER
Requested Prescriptions     Name from pharmacy: ESCITALOPRAM TABS 10MG         Will file in chart as: ESCITALOPRAM 10 MG Oral Tab    Sig: TAKE 1 TABLET DAILY    Disp:  90 tablet    Refills:  3    Start: 3/11/2021    Class: Normal    Non-formulary         F

## 2021-06-21 ENCOUNTER — LAB ENCOUNTER (OUTPATIENT)
Dept: LAB | Age: 61
End: 2021-06-21
Attending: FAMILY MEDICINE
Payer: COMMERCIAL

## 2021-06-21 ENCOUNTER — OFFICE VISIT (OUTPATIENT)
Dept: FAMILY MEDICINE CLINIC | Facility: CLINIC | Age: 61
End: 2021-06-21
Payer: COMMERCIAL

## 2021-06-21 VITALS
HEART RATE: 101 BPM | RESPIRATION RATE: 18 BRPM | SYSTOLIC BLOOD PRESSURE: 134 MMHG | BODY MASS INDEX: 26.74 KG/M2 | WEIGHT: 191 LBS | DIASTOLIC BLOOD PRESSURE: 74 MMHG | HEIGHT: 71 IN | TEMPERATURE: 98 F

## 2021-06-21 DIAGNOSIS — Z12.5 PROSTATE CANCER SCREENING: ICD-10-CM

## 2021-06-21 DIAGNOSIS — E11.9 TYPE 2 DIABETES MELLITUS WITHOUT COMPLICATION, UNSPECIFIED WHETHER LONG TERM INSULIN USE (HCC): ICD-10-CM

## 2021-06-21 DIAGNOSIS — Z00.00 ANNUAL PHYSICAL EXAM: Primary | ICD-10-CM

## 2021-06-21 DIAGNOSIS — Z12.11 COLON CANCER SCREENING: ICD-10-CM

## 2021-06-21 DIAGNOSIS — B00.1 COLD SORE: ICD-10-CM

## 2021-06-21 DIAGNOSIS — Z00.00 ANNUAL PHYSICAL EXAM: ICD-10-CM

## 2021-06-21 DIAGNOSIS — M17.11 PRIMARY OSTEOARTHRITIS OF RIGHT KNEE: ICD-10-CM

## 2021-06-21 PROBLEM — G62.9 NEUROPATHY: Status: ACTIVE | Noted: 2021-06-21

## 2021-06-21 PROCEDURE — 82570 ASSAY OF URINE CREATININE: CPT | Performed by: FAMILY MEDICINE

## 2021-06-21 PROCEDURE — 36415 COLL VENOUS BLD VENIPUNCTURE: CPT

## 2021-06-21 PROCEDURE — 3075F SYST BP GE 130 - 139MM HG: CPT | Performed by: FAMILY MEDICINE

## 2021-06-21 PROCEDURE — 80053 COMPREHEN METABOLIC PANEL: CPT

## 2021-06-21 PROCEDURE — 99396 PREV VISIT EST AGE 40-64: CPT | Performed by: FAMILY MEDICINE

## 2021-06-21 PROCEDURE — 80061 LIPID PANEL: CPT

## 2021-06-21 PROCEDURE — 84443 ASSAY THYROID STIM HORMONE: CPT

## 2021-06-21 PROCEDURE — 85025 COMPLETE CBC W/AUTO DIFF WBC: CPT

## 2021-06-21 PROCEDURE — 3008F BODY MASS INDEX DOCD: CPT | Performed by: FAMILY MEDICINE

## 2021-06-21 PROCEDURE — 3078F DIAST BP <80 MM HG: CPT | Performed by: FAMILY MEDICINE

## 2021-06-21 PROCEDURE — 82043 UR ALBUMIN QUANTITATIVE: CPT | Performed by: FAMILY MEDICINE

## 2021-06-21 RX ORDER — VALACYCLOVIR HYDROCHLORIDE 1 G/1
TABLET, FILM COATED ORAL
Qty: 30 TABLET | Refills: 1 | Status: SHIPPED | OUTPATIENT
Start: 2021-06-21

## 2021-06-21 RX ORDER — VALACYCLOVIR HYDROCHLORIDE 1 G/1
TABLET, FILM COATED ORAL
Qty: 30 TABLET | Refills: 1 | Status: SHIPPED | OUTPATIENT
Start: 2021-06-21 | End: 2021-06-21

## 2021-06-21 NOTE — PROGRESS NOTES
Claudio Covarrubias is a 62year old male that presents for annual physical exam.     Patient presents with: Annual: PHQ2:0, CSSR: 0. routine, pend labs-PSA 12/29/2020 active. A1c 6.7% 04/2021.    Asthma: No routine inhalers until peak season, uses once or twi with            empty sella, a benign             process      Depression      Schatzki's ring             UGI revealed what looks to be a Schatzki's ring            2014            [facility]          Current Outpatient Medications:   •  valACYclovir HCl hypothyroidism 4/7/2016   • Adjustment disorder 3/14/2017    With mixed depression and anxiety- psychology dr Eulogio Torres 3.2017   • Asthma    • Carotid stenosis 7/15/2016   • Depression 0/81/84   • Diastolic dysfunction 2/63/8268   • Diverticula of colon Asked        Sleep Concern: Not Asked        Stress Concern: Not Asked        Weight Concern: Not Asked        Special Diet: Not Asked        Back Care: Not Asked        Exercise: Yes          walking        Bike Helmet: Not Asked        Seat Belt: Not Ask BMI 26.64 kg/m²  Estimated body mass index is 26.64 kg/m² as calculated from the following:    Height as of this encounter: 5' 11\" (1.803 m). Weight as of this encounter: 191 lb (86.6 kg).    GENERAL: well developed, well nourished, in no apparent dist detail. Questions and concerns addressed. Red flags to RTC or ED reviewed. Patient (or parent) agrees to plan. Return in about 6 months (around 12/21/2021) for medication management, or sooner if needed.     Rosa Paz, DO  Family Medicine   6/2

## 2021-06-28 ENCOUNTER — TELEPHONE (OUTPATIENT)
Dept: FAMILY MEDICINE CLINIC | Facility: CLINIC | Age: 61
End: 2021-06-28

## 2021-06-28 NOTE — TELEPHONE ENCOUNTER
Spoke with pt, states he called [facility] to schedule appt with Dr. Ambrosio Bradley and was told that he is no longer practicing. Advised pt he can see any provider in that group, he is asking for a specific doctor.  Recommended Dr. Shiv Nelson, Dr. Kelsie Heredia, and Dr. Carmen Trinidad

## 2021-06-28 NOTE — TELEPHONE ENCOUNTER
Patient calling, requesting another GI referral for Colonoscopy. Referred to Dr Melina Garay, office states specialist no longer practicing. Patient will like another recommendation from PCP.

## 2021-10-05 ENCOUNTER — PATIENT MESSAGE (OUTPATIENT)
Dept: FAMILY MEDICINE CLINIC | Facility: CLINIC | Age: 61
End: 2021-10-05

## 2021-10-05 NOTE — TELEPHONE ENCOUNTER
From: Claudio Covarrubias  To: Courtney Jara DO  Sent: 10/5/2021 3:44 PM CDT  Subject: Flu Shot    I called your office last week for my schedule flu shot and was told that haven't arrive yet.  i wanted to know if they have arrived and if so I can be availab

## 2021-10-11 PROBLEM — E11.69 DYSLIPIDEMIA ASSOCIATED WITH TYPE 2 DIABETES MELLITUS (HCC): Status: ACTIVE | Noted: 2021-10-11

## 2021-10-11 PROBLEM — E11.69 DYSLIPIDEMIA ASSOCIATED WITH TYPE 2 DIABETES MELLITUS  (HCC): Status: ACTIVE | Noted: 2021-10-11

## 2021-10-11 PROBLEM — E78.5 DYSLIPIDEMIA ASSOCIATED WITH TYPE 2 DIABETES MELLITUS: Status: ACTIVE | Noted: 2021-10-11

## 2021-10-11 PROBLEM — E11.69 DYSLIPIDEMIA ASSOCIATED WITH TYPE 2 DIABETES MELLITUS: Status: ACTIVE | Noted: 2021-10-11

## 2021-10-11 PROBLEM — E03.8 SECONDARY HYPOTHYROIDISM: Status: ACTIVE | Noted: 2021-10-11

## 2021-10-11 PROBLEM — E78.5 DYSLIPIDEMIA ASSOCIATED WITH TYPE 2 DIABETES MELLITUS  (HCC): Status: ACTIVE | Noted: 2021-10-11

## 2021-10-11 PROBLEM — E78.5 DYSLIPIDEMIA ASSOCIATED WITH TYPE 2 DIABETES MELLITUS (HCC): Status: ACTIVE | Noted: 2021-10-11

## 2021-11-15 DIAGNOSIS — F32.A DEPRESSION, UNSPECIFIED DEPRESSION TYPE: ICD-10-CM

## 2021-11-15 DIAGNOSIS — E78.2 MIXED HYPERLIPIDEMIA: ICD-10-CM

## 2021-11-15 RX ORDER — ESCITALOPRAM OXALATE 10 MG/1
TABLET ORAL
Qty: 90 TABLET | Refills: 2 | Status: SHIPPED | OUTPATIENT
Start: 2021-11-15

## 2021-11-15 RX ORDER — ATORVASTATIN CALCIUM 40 MG/1
TABLET, FILM COATED ORAL
Qty: 90 TABLET | Refills: 2 | Status: SHIPPED | OUTPATIENT
Start: 2021-11-15

## 2021-11-19 ENCOUNTER — PATIENT MESSAGE (OUTPATIENT)
Dept: FAMILY MEDICINE CLINIC | Facility: CLINIC | Age: 61
End: 2021-11-19

## 2021-11-22 NOTE — TELEPHONE ENCOUNTER
From: Gurinder Spence  To: Aaliyah Pappas DO  Sent: 11/19/2021 3:49 PM CST  Subject: Prescription    I’ve just received this email from [phone] in regards to what my doctor is required to do in order to continue my coverage do [employer].  Please can

## 2021-11-23 ENCOUNTER — IMMUNIZATION (OUTPATIENT)
Dept: LAB | Facility: HOSPITAL | Age: 61
End: 2021-11-23
Attending: EMERGENCY MEDICINE
Payer: COMMERCIAL

## 2021-11-23 DIAGNOSIS — Z23 NEED FOR VACCINATION: Primary | ICD-10-CM

## 2021-11-23 PROCEDURE — 0004A SARSCOV2 VAC 30MCG/0.3ML IM: CPT | Performed by: NURSE PRACTITIONER

## 2022-01-05 ENCOUNTER — PATIENT MESSAGE (OUTPATIENT)
Dept: FAMILY MEDICINE CLINIC | Facility: CLINIC | Age: 62
End: 2022-01-05

## 2022-01-05 DIAGNOSIS — E11.9 TYPE 2 DIABETES MELLITUS WITHOUT COMPLICATION, WITHOUT LONG-TERM CURRENT USE OF INSULIN (HCC): ICD-10-CM

## 2022-01-06 RX ORDER — CANAGLIFLOZIN 100 MG/1
TABLET, FILM COATED ORAL
Qty: 90 TABLET | Refills: 1 | Status: SHIPPED | OUTPATIENT
Start: 2022-01-06 | End: 2022-01-31

## 2022-01-06 NOTE — TELEPHONE ENCOUNTER
Canagliflozin (INVOKANA) 100 MG Oral Tab          Sig: TAKE 1 TABLET DAILY    Disp:  90 tablet    Refills:  1    Start: 1/6/2022    Class: Normal    Non-formulary For: Type 2 diabetes mellitus without complication, without long-term current use of insulin

## 2022-01-06 NOTE — TELEPHONE ENCOUNTER
From: Elvia Hassan  To: Monica Solis DO  Sent: 1/5/2022 7:49 PM CST  Subject: Prescription refill    Prescription refill with new company.    [employer]  Consider contacting your doctor to speed up the process  Request number: [id number]    Medications i

## 2022-01-10 DIAGNOSIS — E11.9 TYPE 2 DIABETES MELLITUS WITHOUT COMPLICATION, WITHOUT LONG-TERM CURRENT USE OF INSULIN (HCC): ICD-10-CM

## 2022-01-10 DIAGNOSIS — K29.50 CHRONIC GASTRITIS, PRESENCE OF BLEEDING UNSPECIFIED, UNSPECIFIED GASTRITIS TYPE: ICD-10-CM

## 2022-01-11 NOTE — TELEPHONE ENCOUNTER
Diabetic Medication Protocol Failed 01/10/2022 11:54 PM   Protocol Details  Appointment in past 6 or next 3 months    HgBA1C procedure resulted in past 6 months    Last HgBA1C < 7.5    Microalbumin procedure in past 12 months or taking ACE/ARB     Refill p

## 2022-01-12 RX ORDER — OMEPRAZOLE 20 MG/1
CAPSULE, DELAYED RELEASE ORAL
Qty: 90 CAPSULE | Refills: 0 | Status: SHIPPED | OUTPATIENT
Start: 2022-01-12 | End: 2022-01-31

## 2022-01-12 NOTE — TELEPHONE ENCOUNTER
Pt states that he has about two months of the medications. Does not use [facility] any longer.    Future Appointments   Date Time Provider Trace Carmen   1/19/2022  2:20 PM Honey Ji MD [facility] [address]   2/14/2022 10:30 AM Veronica

## 2022-01-28 DIAGNOSIS — E11.9 TYPE 2 DIABETES MELLITUS WITHOUT COMPLICATION, WITHOUT LONG-TERM CURRENT USE OF INSULIN (HCC): ICD-10-CM

## 2022-01-28 DIAGNOSIS — I10 ESSENTIAL HYPERTENSION: ICD-10-CM

## 2022-01-28 DIAGNOSIS — K29.50 CHRONIC GASTRITIS, PRESENCE OF BLEEDING UNSPECIFIED, UNSPECIFIED GASTRITIS TYPE: ICD-10-CM

## 2022-01-28 RX ORDER — OMEPRAZOLE 20 MG/1
20 CAPSULE, DELAYED RELEASE ORAL DAILY
Qty: 90 CAPSULE | Refills: 0 | Status: CANCELLED | OUTPATIENT
Start: 2022-01-28

## 2022-01-28 RX ORDER — SEMAGLUTIDE 1.34 MG/ML
1 INJECTION, SOLUTION SUBCUTANEOUS WEEKLY
Qty: 9 ML | Refills: 1 | Status: CANCELLED | OUTPATIENT
Start: 2022-01-28

## 2022-01-31 ENCOUNTER — TELEPHONE (OUTPATIENT)
Dept: FAMILY MEDICINE CLINIC | Facility: CLINIC | Age: 62
End: 2022-01-31

## 2022-01-31 DIAGNOSIS — E11.9 TYPE 2 DIABETES MELLITUS WITHOUT COMPLICATION, WITHOUT LONG-TERM CURRENT USE OF INSULIN (HCC): ICD-10-CM

## 2022-01-31 DIAGNOSIS — I10 ESSENTIAL HYPERTENSION: ICD-10-CM

## 2022-01-31 RX ORDER — LOSARTAN POTASSIUM 50 MG/1
50 TABLET ORAL DAILY
Qty: 90 TABLET | Refills: 0 | Status: SHIPPED | OUTPATIENT
Start: 2022-01-31

## 2022-01-31 RX ORDER — OMEPRAZOLE 20 MG/1
20 CAPSULE, DELAYED RELEASE ORAL DAILY
Qty: 90 CAPSULE | Refills: 0 | Status: SHIPPED | OUTPATIENT
Start: 2022-01-31

## 2022-01-31 RX ORDER — SEMAGLUTIDE 1.34 MG/ML
1 INJECTION, SOLUTION SUBCUTANEOUS WEEKLY
Qty: 9 ML | Refills: 1 | Status: SHIPPED | OUTPATIENT
Start: 2022-01-31

## 2022-01-31 RX ORDER — LOSARTAN POTASSIUM 50 MG/1
TABLET ORAL
Qty: 90 TABLET | Refills: 3 | Status: SHIPPED | OUTPATIENT
Start: 2022-01-31

## 2022-01-31 RX ORDER — CANAGLIFLOZIN 100 MG/1
TABLET, FILM COATED ORAL
Qty: 90 TABLET | Refills: 0 | Status: SHIPPED | OUTPATIENT
Start: 2022-01-31

## 2022-01-31 NOTE — TELEPHONE ENCOUNTER
Patient calling on refills-Metformin,Omeprazole,Ozempic. Patient no longer using [facility] as mail order, using [facility] now.  Pharmacies have been updated, prior refills might have been sent to [facility]-please send to [facility] mail order

## 2022-01-31 NOTE — TELEPHONE ENCOUNTER
Requested Prescriptions     losartan 50 MG Oral Tab          Possible duplicate: Antonio to review recent actions on this medication    Sig: Take 1 tablet (50 mg total) by mouth daily.     Disp:  90 tablet    Refills:  3    Start: 1/28/2022    Class: Normal Appointments   Date Time Provider Trace Nella   2/14/2022 10:30 AM Broton, Re[place],  EMG [address]   3/15/2022 10:20 AM Krupa Escobar MD [facility]   4/12/2022 10:00 AM Rigoberto Leone NP [facility]     Ozempic, metformin,

## 2022-02-01 RX ORDER — LOSARTAN POTASSIUM 50 MG/1
TABLET ORAL
Qty: 90 TABLET | Refills: 3 | OUTPATIENT
Start: 2022-02-01

## 2022-02-14 ENCOUNTER — OFFICE VISIT (OUTPATIENT)
Dept: FAMILY MEDICINE CLINIC | Facility: CLINIC | Age: 62
End: 2022-02-14
Payer: COMMERCIAL

## 2022-02-14 VITALS
BODY MASS INDEX: 26.46 KG/M2 | RESPIRATION RATE: 16 BRPM | OXYGEN SATURATION: 98 % | HEART RATE: 117 BPM | WEIGHT: 189 LBS | DIASTOLIC BLOOD PRESSURE: 70 MMHG | TEMPERATURE: 97 F | HEIGHT: 71 IN | SYSTOLIC BLOOD PRESSURE: 110 MMHG

## 2022-02-14 DIAGNOSIS — E78.2 MIXED HYPERLIPIDEMIA: ICD-10-CM

## 2022-02-14 DIAGNOSIS — R09.89 CHRONIC THROAT CLEARING: ICD-10-CM

## 2022-02-14 DIAGNOSIS — F32.A DEPRESSION, UNSPECIFIED DEPRESSION TYPE: ICD-10-CM

## 2022-02-14 DIAGNOSIS — J45.20 MILD INTERMITTENT ASTHMA WITHOUT COMPLICATION: ICD-10-CM

## 2022-02-14 DIAGNOSIS — K29.50 CHRONIC GASTRITIS, PRESENCE OF BLEEDING UNSPECIFIED, UNSPECIFIED GASTRITIS TYPE: ICD-10-CM

## 2022-02-14 DIAGNOSIS — E11.9 TYPE 2 DIABETES MELLITUS WITHOUT COMPLICATION, WITHOUT LONG-TERM CURRENT USE OF INSULIN (HCC): Primary | ICD-10-CM

## 2022-02-14 DIAGNOSIS — I10 ESSENTIAL HYPERTENSION: ICD-10-CM

## 2022-02-14 PROCEDURE — 3078F DIAST BP <80 MM HG: CPT | Performed by: FAMILY MEDICINE

## 2022-02-14 PROCEDURE — 99214 OFFICE O/P EST MOD 30 MIN: CPT | Performed by: FAMILY MEDICINE

## 2022-02-14 PROCEDURE — 3074F SYST BP LT 130 MM HG: CPT | Performed by: FAMILY MEDICINE

## 2022-02-14 PROCEDURE — 3008F BODY MASS INDEX DOCD: CPT | Performed by: FAMILY MEDICINE

## 2022-02-14 RX ORDER — OMEPRAZOLE 20 MG/1
20 CAPSULE, DELAYED RELEASE ORAL DAILY
Qty: 90 CAPSULE | Refills: 3 | Status: SHIPPED | OUTPATIENT
Start: 2022-02-14

## 2022-02-14 RX ORDER — ESCITALOPRAM OXALATE 10 MG/1
10 TABLET ORAL DAILY
Qty: 90 TABLET | Refills: 3 | Status: SHIPPED | OUTPATIENT
Start: 2022-02-14

## 2022-02-14 RX ORDER — LOSARTAN POTASSIUM 50 MG/1
50 TABLET ORAL DAILY
Qty: 90 TABLET | Refills: 3 | Status: SHIPPED | OUTPATIENT
Start: 2022-02-14

## 2022-02-14 RX ORDER — CANAGLIFLOZIN 100 MG/1
100 TABLET, FILM COATED ORAL DAILY
Qty: 90 TABLET | Refills: 3 | Status: SHIPPED | OUTPATIENT
Start: 2022-02-14

## 2022-02-14 RX ORDER — ATORVASTATIN CALCIUM 40 MG/1
40 TABLET, FILM COATED ORAL NIGHTLY
Qty: 90 TABLET | Refills: 3 | Status: SHIPPED | OUTPATIENT
Start: 2022-02-14

## 2022-04-26 ENCOUNTER — LAB REQUISITION (OUTPATIENT)
Age: 62
End: 2022-04-26
Payer: COMMERCIAL

## 2022-04-26 PROCEDURE — 88305 TISSUE EXAM BY PATHOLOGIST: CPT | Performed by: INTERNAL MEDICINE

## 2022-05-24 RX ORDER — SEMAGLUTIDE 1.34 MG/ML
INJECTION, SOLUTION SUBCUTANEOUS
Qty: 9 ML | Refills: 3 | Status: SHIPPED | OUTPATIENT
Start: 2022-05-24

## 2022-05-31 LAB — AMB EXT HGBA1C: 6.8 %

## 2022-05-31 PROCEDURE — 3044F HG A1C LEVEL LT 7.0%: CPT | Performed by: FAMILY MEDICINE

## 2022-06-06 ENCOUNTER — LAB ENCOUNTER (OUTPATIENT)
Dept: LAB | Age: 62
End: 2022-06-06
Attending: FAMILY MEDICINE
Payer: COMMERCIAL

## 2022-06-06 DIAGNOSIS — E23.6: Primary | ICD-10-CM

## 2022-06-06 DIAGNOSIS — E03.8 TOXIC DIFFUSE GOITER WITH PRETIBIAL MYXEDEMA: ICD-10-CM

## 2022-06-06 DIAGNOSIS — E78.2 MIXED HYPERLIPIDEMIA: ICD-10-CM

## 2022-06-06 DIAGNOSIS — E05.00 TOXIC DIFFUSE GOITER WITH PRETIBIAL MYXEDEMA: ICD-10-CM

## 2022-06-06 DIAGNOSIS — E11.9 TYPE 2 DIABETES MELLITUS WITHOUT COMPLICATION, WITHOUT LONG-TERM CURRENT USE OF INSULIN (HCC): ICD-10-CM

## 2022-06-06 LAB
ALBUMIN SERPL-MCNC: 3.9 G/DL (ref 3.4–5)
ALBUMIN/GLOB SERPL: 1.3 {RATIO} (ref 1–2)
ALP LIVER SERPL-CCNC: 90 U/L
ALT SERPL-CCNC: 23 U/L
ANION GAP SERPL CALC-SCNC: 5 MMOL/L (ref 0–18)
AST SERPL-CCNC: 12 U/L (ref 15–37)
BILIRUB SERPL-MCNC: 0.7 MG/DL (ref 0.1–2)
BUN BLD-MCNC: 13 MG/DL (ref 7–18)
CALCIUM BLD-MCNC: 9.3 MG/DL (ref 8.5–10.1)
CHLORIDE SERPL-SCNC: 106 MMOL/L (ref 98–112)
CHOLEST SERPL-MCNC: 121 MG/DL (ref ?–200)
CO2 SERPL-SCNC: 27 MMOL/L (ref 21–32)
CREAT BLD-MCNC: 0.92 MG/DL
CREAT UR-SCNC: 94.8 MG/DL
FASTING PATIENT LIPID ANSWER: YES
FASTING STATUS PATIENT QL REPORTED: YES
GLOBULIN PLAS-MCNC: 3 G/DL (ref 2.8–4.4)
GLUCOSE BLD-MCNC: 143 MG/DL (ref 70–99)
HDLC SERPL-MCNC: 44 MG/DL (ref 40–59)
LDLC SERPL CALC-MCNC: 62 MG/DL (ref ?–100)
MICROALBUMIN UR-MCNC: 0.6 MG/DL
MICROALBUMIN/CREAT 24H UR-RTO: 6.3 UG/MG (ref ?–30)
NONHDLC SERPL-MCNC: 77 MG/DL (ref ?–130)
OSMOLALITY SERPL CALC.SUM OF ELEC: 289 MOSM/KG (ref 275–295)
POTASSIUM SERPL-SCNC: 4.5 MMOL/L (ref 3.5–5.1)
PROT SERPL-MCNC: 6.9 G/DL (ref 6.4–8.2)
SODIUM SERPL-SCNC: 138 MMOL/L (ref 136–145)
T4 FREE SERPL-MCNC: 0.7 NG/DL (ref 0.8–1.7)
TRIGL SERPL-MCNC: 76 MG/DL (ref 30–149)
TSI SER-ACNC: 1.12 MIU/ML (ref 0.36–3.74)
VLDLC SERPL CALC-MCNC: 11 MG/DL (ref 0–30)

## 2022-06-06 PROCEDURE — 80061 LIPID PANEL: CPT

## 2022-06-06 PROCEDURE — 3061F NEG MICROALBUMINURIA REV: CPT | Performed by: FAMILY MEDICINE

## 2022-06-06 PROCEDURE — 82043 UR ALBUMIN QUANTITATIVE: CPT

## 2022-06-06 PROCEDURE — 84439 ASSAY OF FREE THYROXINE: CPT

## 2022-06-06 PROCEDURE — 82570 ASSAY OF URINE CREATININE: CPT

## 2022-06-06 PROCEDURE — 36415 COLL VENOUS BLD VENIPUNCTURE: CPT

## 2022-06-06 PROCEDURE — 80053 COMPREHEN METABOLIC PANEL: CPT

## 2022-06-06 PROCEDURE — 84443 ASSAY THYROID STIM HORMONE: CPT

## 2022-07-01 ENCOUNTER — TELEPHONE (OUTPATIENT)
Dept: FAMILY MEDICINE CLINIC | Facility: CLINIC | Age: 62
End: 2022-07-01

## 2022-07-01 LAB — AMB EXT COVID-19 RESULT: DETECTED

## 2022-07-01 NOTE — TELEPHONE ENCOUNTER
Pt states he left [facility] and is at [facility] waiting for the Paxlovid that was prescribed to him. Pt states he feels he is getting worse and states [facility] advised pt to go to the hospital, pt states \"my breathing is fine, like 97% so. \" This writer advised pt to be seen at [facility] in [facility] as he can receive MAB infusion, informed pt he is a candidate. Pt states he will \"keep that in mind\" if he is not feeling better in the next day or 2. Pt thanked for the call back.

## 2022-07-01 NOTE — TELEPHONE ENCOUNTER
Pt says he was exposed last week to covid at work where the people he worked with did not wear masks. He has started to feel a very sore throat, and body aches, and his home test shows positive. [facility], but he is wondering what he should do now. Is it possible for dr to send him in something to help his symptoms? Or recommend something over the counter for the weekend? He would like a call back from a nurse to let him know what he might be able to do.

## 2022-07-05 ENCOUNTER — TELEPHONE (OUTPATIENT)
Dept: FAMILY MEDICINE CLINIC | Facility: CLINIC | Age: 62
End: 2022-07-05

## 2022-07-05 NOTE — TELEPHONE ENCOUNTER
Pt called and said he is just leaving [facility]. He had a COVID test and it was negative but the EKG was abnormal and he was told to follow up with PCP in 3 days. No availability.

## 2022-07-05 NOTE — TELEPHONE ENCOUNTER
Pt states he returned to [facility] to be retested for Covid so he could return to work in sooner than 10 days. Pt states he tested negative but was tachycardic so they did an EKG, pt states he was still tachyardic with HR ~130s. Pt states he has a \"higher\" heart rate but he has never seen it that high, pt states he did have to wait in his car for 30 minutes and even with the air on \"I was dying in this heat. \" Pt also had taken Paxlovid. Pt states he has been resting at home and HR is currently in the 90s which is normal per pt. Pt states he has no chest pain, dizziness, shortness of breath, \"no nothing,\" recommended pt follow up with PCP next week when she returns to the office, pt in agreement with this plan and appointment scheduled. Discussed red flag symptoms to go go to ER for immediate evaluation, pt verbalized understanding and states \"my wife is watching me like a hawk. \" Asked pt to call back with any questions prior to his appointment, pt thanked for the call back.    Future Appointments   Date Time Provider Tarce Carmen   7/12/2022  4:30 PM Mario Kidd DO EMG [address]   8/2/2022  1:30 PM Maria Luisa Martin,  EMG [address]

## 2022-07-05 NOTE — TELEPHONE ENCOUNTER
Pt is covid positive as of 7-1-22. There are currently no openings for rest of the week with the providers, unless video visit.

## 2022-07-12 ENCOUNTER — OFFICE VISIT (OUTPATIENT)
Dept: FAMILY MEDICINE CLINIC | Facility: CLINIC | Age: 62
End: 2022-07-12
Payer: COMMERCIAL

## 2022-07-12 VITALS
WEIGHT: 190.13 LBS | RESPIRATION RATE: 18 BRPM | BODY MASS INDEX: 26.62 KG/M2 | TEMPERATURE: 97 F | HEIGHT: 71 IN | HEART RATE: 92 BPM | SYSTOLIC BLOOD PRESSURE: 126 MMHG | OXYGEN SATURATION: 98 % | DIASTOLIC BLOOD PRESSURE: 68 MMHG

## 2022-07-12 DIAGNOSIS — Z86.16 HISTORY OF COVID-19: Primary | ICD-10-CM

## 2022-07-12 DIAGNOSIS — R00.0 TACHYCARDIA: ICD-10-CM

## 2022-07-12 PROCEDURE — 3008F BODY MASS INDEX DOCD: CPT | Performed by: FAMILY MEDICINE

## 2022-07-12 PROCEDURE — 99213 OFFICE O/P EST LOW 20 MIN: CPT | Performed by: FAMILY MEDICINE

## 2022-07-12 PROCEDURE — 3074F SYST BP LT 130 MM HG: CPT | Performed by: FAMILY MEDICINE

## 2022-07-12 PROCEDURE — 3078F DIAST BP <80 MM HG: CPT | Performed by: FAMILY MEDICINE

## 2022-07-14 ENCOUNTER — TELEPHONE (OUTPATIENT)
Dept: FAMILY MEDICINE CLINIC | Facility: CLINIC | Age: 62
End: 2022-07-14

## 2022-07-14 NOTE — TELEPHONE ENCOUNTER
Ángela Cancino called and said she needs some information about Zeeshan's COVID positive date and also dates he was seen.

## 2022-07-14 NOTE — TELEPHONE ENCOUNTER
Ángela from [facility] is returning call to [employer]. Ángela needs Disability dates, diagnosis and treat. Fax [phone]    Ángela faxed paperwork on the 6th for completion.     Please call  [phone]

## 2022-07-14 NOTE — TELEPHONE ENCOUNTER
Talked to pt yesterday and he said paperwork was only for 1 day off and it was overtime day and likely wouldn't get paid, ok to disregard.       Jesika Sousa, DO  Family Medicine

## 2022-07-18 ENCOUNTER — TELEPHONE (OUTPATIENT)
Dept: FAMILY MEDICINE CLINIC | Facility: CLINIC | Age: 62
End: 2022-07-18

## 2022-07-18 RX ORDER — BENZONATATE 100 MG/1
100 CAPSULE ORAL 3 TIMES DAILY PRN
Qty: 30 CAPSULE | Refills: 0 | Status: SHIPPED | OUTPATIENT
Start: 2022-07-18

## 2022-07-18 NOTE — TELEPHONE ENCOUNTER
Last OV 7/12/22  ASSESSMENT AND PLAN:    1.  History of COVID-19  - doing well on paxlovid, symptoms mostly resolved  - continue supportive care and f/u prn    Pt still with cough, requesting medication

## 2022-07-18 NOTE — TELEPHONE ENCOUNTER
Will send him tessalon for cough. Let me know if not improving, could consider inhaler if any wheezing/tightness in chest with cough.      Remington Mitchell, DO  Family Medicine

## 2022-07-18 NOTE — TELEPHONE ENCOUNTER
- Pt had an appointment this past week and is still coughing. Pt states he has to suck on lozenges to keep from coughing. Pt would like to know if there is something he can take to get the cough and tickle under control. Constantly having to clear throat. [facility] [address] [facility], [address] [place] [address] AT [facility] OF [address], [phone], [phone]      Please call to advise.   [phone]

## 2022-07-27 RX ORDER — BENZONATATE 100 MG/1
CAPSULE ORAL
Qty: 30 CAPSULE | Refills: 0 | Status: SHIPPED | OUTPATIENT
Start: 2022-07-27

## 2022-08-02 ENCOUNTER — OFFICE VISIT (OUTPATIENT)
Dept: FAMILY MEDICINE CLINIC | Facility: CLINIC | Age: 62
End: 2022-08-02
Payer: COMMERCIAL

## 2022-08-02 VITALS
DIASTOLIC BLOOD PRESSURE: 60 MMHG | TEMPERATURE: 97 F | HEIGHT: 71 IN | RESPIRATION RATE: 16 BRPM | WEIGHT: 190.38 LBS | BODY MASS INDEX: 26.65 KG/M2 | HEART RATE: 66 BPM | SYSTOLIC BLOOD PRESSURE: 122 MMHG | OXYGEN SATURATION: 99 %

## 2022-08-02 DIAGNOSIS — Z13.31 NEGATIVE DEPRESSION SCREENING: ICD-10-CM

## 2022-08-02 DIAGNOSIS — Z00.00 ANNUAL PHYSICAL EXAM: Primary | ICD-10-CM

## 2022-08-02 PROBLEM — E11.65 UNCONTROLLED TYPE 2 DIABETES MELLITUS WITH HYPERGLYCEMIA, WITHOUT LONG-TERM CURRENT USE OF INSULIN (HCC): Status: RESOLVED | Noted: 2018-10-23 | Resolved: 2022-08-02

## 2022-08-02 PROBLEM — F43.20 ADJUSTMENT DISORDER: Status: RESOLVED | Noted: 2017-03-14 | Resolved: 2022-08-02

## 2022-08-02 PROCEDURE — 3008F BODY MASS INDEX DOCD: CPT | Performed by: FAMILY MEDICINE

## 2022-08-02 PROCEDURE — 3074F SYST BP LT 130 MM HG: CPT | Performed by: FAMILY MEDICINE

## 2022-08-02 PROCEDURE — 3078F DIAST BP <80 MM HG: CPT | Performed by: FAMILY MEDICINE

## 2022-08-02 PROCEDURE — 99396 PREV VISIT EST AGE 40-64: CPT | Performed by: FAMILY MEDICINE

## 2022-08-04 NOTE — TELEPHONE ENCOUNTER
Future Appointments   Date Time Provider Trace Carmen   7/23/2020 12:00 PM Susan Louis DO EMG [address]   10/13/2020  9:15 AM Violeta Mcgowan MD [address] [place] [address]     Patient verbally consents to a virtual/telephone check-in service for 07/23 Unknown

## 2022-08-29 ENCOUNTER — TELEPHONE (OUTPATIENT)
Dept: NEUROLOGY | Facility: CLINIC | Age: 62
End: 2022-08-29

## 2022-08-29 NOTE — TELEPHONE ENCOUNTER
Discussed with Ja Desai RN and Dr. Tiffanie Oropeza. Ok to schedule patient in PF for EMG. Routed to .

## 2022-08-29 NOTE — TELEPHONE ENCOUNTER
pt is calling to schedule an EMG with provider but he is not a current pt and has an order to get an EMG referred by Dr. Jett Nguyen. pt does not want to see Dr. Debby Martinez as he is the only provider who will do outside EMG orders. pt is wanting to know if Dr. Smita Truong will do the EMG?

## 2022-10-24 ENCOUNTER — OFFICE VISIT (OUTPATIENT)
Dept: FAMILY MEDICINE CLINIC | Facility: CLINIC | Age: 62
End: 2022-10-24
Payer: COMMERCIAL

## 2022-10-24 ENCOUNTER — ORDER TRANSCRIPTION (OUTPATIENT)
Dept: ADMINISTRATIVE | Facility: HOSPITAL | Age: 62
End: 2022-10-24

## 2022-10-24 ENCOUNTER — TELEPHONE (OUTPATIENT)
Dept: FAMILY MEDICINE CLINIC | Facility: CLINIC | Age: 62
End: 2022-10-24

## 2022-10-24 VITALS
TEMPERATURE: 97 F | RESPIRATION RATE: 14 BRPM | SYSTOLIC BLOOD PRESSURE: 132 MMHG | WEIGHT: 193 LBS | HEART RATE: 100 BPM | OXYGEN SATURATION: 96 % | HEIGHT: 71 IN | BODY MASS INDEX: 27.02 KG/M2 | DIASTOLIC BLOOD PRESSURE: 72 MMHG

## 2022-10-24 DIAGNOSIS — K29.50 CHRONIC GASTRITIS, PRESENCE OF BLEEDING UNSPECIFIED, UNSPECIFIED GASTRITIS TYPE: ICD-10-CM

## 2022-10-24 DIAGNOSIS — E03.9 ACQUIRED HYPOTHYROIDISM: ICD-10-CM

## 2022-10-24 DIAGNOSIS — E11.9 TYPE 2 DIABETES MELLITUS WITHOUT COMPLICATION, WITHOUT LONG-TERM CURRENT USE OF INSULIN (HCC): ICD-10-CM

## 2022-10-24 DIAGNOSIS — F32.A DEPRESSION, UNSPECIFIED DEPRESSION TYPE: ICD-10-CM

## 2022-10-24 DIAGNOSIS — B00.1 COLD SORE: ICD-10-CM

## 2022-10-24 DIAGNOSIS — Q82.4: ICD-10-CM

## 2022-10-24 DIAGNOSIS — M25.541 ARTHRALGIA OF BOTH HANDS: ICD-10-CM

## 2022-10-24 DIAGNOSIS — J45.20 MILD INTERMITTENT ASTHMA WITHOUT COMPLICATION: ICD-10-CM

## 2022-10-24 DIAGNOSIS — Z23 NEED FOR VACCINATION: ICD-10-CM

## 2022-10-24 DIAGNOSIS — M25.542 ARTHRALGIA OF BOTH HANDS: ICD-10-CM

## 2022-10-24 DIAGNOSIS — I10 ESSENTIAL HYPERTENSION: ICD-10-CM

## 2022-10-24 DIAGNOSIS — E11.9 TYPE 2 DIABETES MELLITUS WITHOUT COMPLICATION, UNSPECIFIED WHETHER LONG TERM INSULIN USE (HCC): Primary | ICD-10-CM

## 2022-10-24 PROBLEM — E11.40 TYPE 2 DIABETES MELLITUS WITH DIABETIC NEUROPATHY (HCC): Status: ACTIVE | Noted: 2022-10-24

## 2022-10-24 PROBLEM — F33.0 MAJOR DEPRESSIVE DISORDER, RECURRENT, MILD (HCC): Status: ACTIVE | Noted: 2022-10-24

## 2022-10-24 PROBLEM — F33.0 MAJOR DEPRESSIVE DISORDER, RECURRENT, MILD: Status: ACTIVE | Noted: 2022-10-24

## 2022-10-24 PROCEDURE — 3008F BODY MASS INDEX DOCD: CPT | Performed by: FAMILY MEDICINE

## 2022-10-24 PROCEDURE — 99214 OFFICE O/P EST MOD 30 MIN: CPT | Performed by: FAMILY MEDICINE

## 2022-10-24 PROCEDURE — 90471 IMMUNIZATION ADMIN: CPT | Performed by: FAMILY MEDICINE

## 2022-10-24 PROCEDURE — 3078F DIAST BP <80 MM HG: CPT | Performed by: FAMILY MEDICINE

## 2022-10-24 PROCEDURE — 90686 IIV4 VACC NO PRSV 0.5 ML IM: CPT | Performed by: FAMILY MEDICINE

## 2022-10-24 PROCEDURE — 3075F SYST BP GE 130 - 139MM HG: CPT | Performed by: FAMILY MEDICINE

## 2022-10-24 RX ORDER — CANAGLIFLOZIN 100 MG/1
100 TABLET, FILM COATED ORAL DAILY
Qty: 90 TABLET | Refills: 3 | Status: SHIPPED | OUTPATIENT
Start: 2022-10-24

## 2022-10-24 RX ORDER — OMEPRAZOLE 20 MG/1
20 CAPSULE, DELAYED RELEASE ORAL DAILY
Qty: 90 CAPSULE | Refills: 3 | Status: SHIPPED | OUTPATIENT
Start: 2022-10-24

## 2022-10-24 RX ORDER — LOSARTAN POTASSIUM 50 MG/1
50 TABLET ORAL DAILY
Qty: 90 TABLET | Refills: 3 | Status: SHIPPED | OUTPATIENT
Start: 2022-10-24

## 2022-10-24 RX ORDER — ALBUTEROL SULFATE 90 UG/1
2 AEROSOL, METERED RESPIRATORY (INHALATION) EVERY 4 HOURS PRN
Qty: 1 EACH | Refills: 3 | Status: SHIPPED | OUTPATIENT
Start: 2022-10-24

## 2022-10-24 RX ORDER — VALACYCLOVIR HYDROCHLORIDE 1 G/1
TABLET, FILM COATED ORAL
Qty: 30 TABLET | Refills: 1 | Status: SHIPPED | OUTPATIENT
Start: 2022-10-24

## 2022-10-24 RX ORDER — ESCITALOPRAM OXALATE 10 MG/1
10 TABLET ORAL DAILY
Qty: 90 TABLET | Refills: 3 | Status: SHIPPED | OUTPATIENT
Start: 2022-10-24

## 2022-10-24 RX ORDER — MELOXICAM 15 MG/1
15 TABLET ORAL DAILY PRN
Qty: 30 TABLET | Refills: 0 | Status: SHIPPED | OUTPATIENT
Start: 2022-10-24 | End: 2022-11-23

## 2022-10-24 RX ORDER — MELOXICAM 15 MG/1
15 TABLET ORAL DAILY PRN
Qty: 30 TABLET | Refills: 0 | Status: SHIPPED | OUTPATIENT
Start: 2022-10-24 | End: 2022-10-24

## 2022-10-24 RX ORDER — SEMAGLUTIDE 1.34 MG/ML
INJECTION, SOLUTION SUBCUTANEOUS
Qty: 9 ML | Refills: 3 | Status: SHIPPED | OUTPATIENT
Start: 2022-10-24

## 2022-10-24 NOTE — TELEPHONE ENCOUNTER
One of the meds today was for pain and he needs it to go to [facility], he does not know what it was called, please advise.

## 2022-10-25 ENCOUNTER — HOSPITAL ENCOUNTER (OUTPATIENT)
Dept: GENERAL RADIOLOGY | Age: 62
Discharge: HOME | End: 2022-10-25
Attending: FAMILY MEDICINE
Payer: COMMERCIAL

## 2022-10-25 DIAGNOSIS — M25.542 ARTHRALGIA OF BOTH HANDS: ICD-10-CM

## 2022-10-25 DIAGNOSIS — M25.541 ARTHRALGIA OF BOTH HANDS: ICD-10-CM

## 2022-10-25 PROCEDURE — 73130 X-RAY EXAM OF HAND: CPT | Performed by: FAMILY MEDICINE

## 2022-11-15 ENCOUNTER — LAB ENCOUNTER (OUTPATIENT)
Dept: LAB | Age: 62
End: 2022-11-15
Attending: FAMILY MEDICINE
Payer: COMMERCIAL

## 2022-11-15 DIAGNOSIS — I10 ESSENTIAL HYPERTENSION: ICD-10-CM

## 2022-11-15 DIAGNOSIS — E11.9 TYPE 2 DIABETES MELLITUS WITHOUT COMPLICATION, WITHOUT LONG-TERM CURRENT USE OF INSULIN (HCC): ICD-10-CM

## 2022-11-15 DIAGNOSIS — M25.541 ARTHRALGIA OF BOTH HANDS: ICD-10-CM

## 2022-11-15 DIAGNOSIS — M25.542 ARTHRALGIA OF BOTH HANDS: ICD-10-CM

## 2022-11-15 DIAGNOSIS — E03.9 ACQUIRED HYPOTHYROIDISM: ICD-10-CM

## 2022-11-15 LAB
ALBUMIN SERPL-MCNC: 4.2 G/DL (ref 3.4–5)
ALBUMIN/GLOB SERPL: 1.7 {RATIO} (ref 1–2)
ALP LIVER SERPL-CCNC: 93 U/L
ALT SERPL-CCNC: 34 U/L
ANION GAP SERPL CALC-SCNC: 3 MMOL/L (ref 0–18)
AST SERPL-CCNC: 17 U/L (ref 15–37)
BASOPHILS # BLD AUTO: 0.04 X10(3) UL (ref 0–0.2)
BASOPHILS NFR BLD AUTO: 0.8 %
BILIRUB SERPL-MCNC: 0.7 MG/DL (ref 0.1–2)
BUN BLD-MCNC: 12 MG/DL (ref 7–18)
CALCIUM BLD-MCNC: 9.5 MG/DL (ref 8.5–10.1)
CHLORIDE SERPL-SCNC: 105 MMOL/L (ref 98–112)
CHOLEST SERPL-MCNC: 181 MG/DL (ref ?–200)
CO2 SERPL-SCNC: 30 MMOL/L (ref 21–32)
CREAT BLD-MCNC: 0.84 MG/DL
CREAT UR-SCNC: 113 MG/DL
CRP SERPL-MCNC: 0.67 MG/DL (ref ?–0.3)
EOSINOPHIL # BLD AUTO: 0.16 X10(3) UL (ref 0–0.7)
EOSINOPHIL NFR BLD AUTO: 3.1 %
ERYTHROCYTE [DISTWIDTH] IN BLOOD BY AUTOMATED COUNT: 15.5 %
ERYTHROCYTE [SEDIMENTATION RATE] IN BLOOD: 6 MM/HR
EST. AVERAGE GLUCOSE BLD GHB EST-MCNC: 177 MG/DL (ref 68–126)
FASTING PATIENT LIPID ANSWER: YES
FASTING STATUS PATIENT QL REPORTED: YES
GFR SERPLBLD BASED ON 1.73 SQ M-ARVRAT: 99 ML/MIN/1.73M2 (ref 60–?)
GLOBULIN PLAS-MCNC: 2.5 G/DL (ref 2.8–4.4)
GLUCOSE BLD-MCNC: 145 MG/DL (ref 70–99)
HBA1C MFR BLD: 7.8 % (ref ?–5.7)
HCT VFR BLD AUTO: 41.4 %
HDLC SERPL-MCNC: 48 MG/DL (ref 40–59)
HGB BLD-MCNC: 12.8 G/DL
IMM GRANULOCYTES # BLD AUTO: 0.03 X10(3) UL (ref 0–1)
IMM GRANULOCYTES NFR BLD: 0.6 %
LDLC SERPL CALC-MCNC: 111 MG/DL (ref ?–100)
LYMPHOCYTES # BLD AUTO: 1.15 X10(3) UL (ref 1–4)
LYMPHOCYTES NFR BLD AUTO: 22.2 %
MCH RBC QN AUTO: 26.1 PG (ref 26–34)
MCHC RBC AUTO-ENTMCNC: 30.9 G/DL (ref 31–37)
MCV RBC AUTO: 84.3 FL
MICROALBUMIN UR-MCNC: 1.22 MG/DL
MICROALBUMIN/CREAT 24H UR-RTO: 10.8 UG/MG (ref ?–30)
MONOCYTES # BLD AUTO: 0.43 X10(3) UL (ref 0.1–1)
MONOCYTES NFR BLD AUTO: 8.3 %
NEUTROPHILS # BLD AUTO: 3.37 X10 (3) UL (ref 1.5–7.7)
NEUTROPHILS # BLD AUTO: 3.37 X10(3) UL (ref 1.5–7.7)
NEUTROPHILS NFR BLD AUTO: 65 %
NONHDLC SERPL-MCNC: 133 MG/DL (ref ?–130)
OSMOLALITY SERPL CALC.SUM OF ELEC: 288 MOSM/KG (ref 275–295)
PLATELET # BLD AUTO: 224 10(3)UL (ref 150–450)
POTASSIUM SERPL-SCNC: 4 MMOL/L (ref 3.5–5.1)
PROT SERPL-MCNC: 6.7 G/DL (ref 6.4–8.2)
RBC # BLD AUTO: 4.91 X10(6)UL
RHEUMATOID FACT SERPL-ACNC: <10 IU/ML (ref ?–15)
SODIUM SERPL-SCNC: 138 MMOL/L (ref 136–145)
TRIGL SERPL-MCNC: 121 MG/DL (ref 30–149)
TSI SER-ACNC: 1.72 MIU/ML (ref 0.36–3.74)
URATE SERPL-MCNC: 2.6 MG/DL
VLDLC SERPL CALC-MCNC: 21 MG/DL (ref 0–30)
WBC # BLD AUTO: 5.2 X10(3) UL (ref 4–11)

## 2022-11-15 PROCEDURE — 84443 ASSAY THYROID STIM HORMONE: CPT

## 2022-11-15 PROCEDURE — 86038 ANTINUCLEAR ANTIBODIES: CPT

## 2022-11-15 PROCEDURE — 83036 HEMOGLOBIN GLYCOSYLATED A1C: CPT

## 2022-11-15 PROCEDURE — 84550 ASSAY OF BLOOD/URIC ACID: CPT

## 2022-11-15 PROCEDURE — 80053 COMPREHEN METABOLIC PANEL: CPT

## 2022-11-15 PROCEDURE — 86140 C-REACTIVE PROTEIN: CPT

## 2022-11-15 PROCEDURE — 86200 CCP ANTIBODY: CPT

## 2022-11-15 PROCEDURE — 82570 ASSAY OF URINE CREATININE: CPT

## 2022-11-15 PROCEDURE — 85652 RBC SED RATE AUTOMATED: CPT

## 2022-11-15 PROCEDURE — 36415 COLL VENOUS BLD VENIPUNCTURE: CPT

## 2022-11-15 PROCEDURE — 86431 RHEUMATOID FACTOR QUANT: CPT

## 2022-11-15 PROCEDURE — 86225 DNA ANTIBODY NATIVE: CPT

## 2022-11-15 PROCEDURE — 85025 COMPLETE CBC W/AUTO DIFF WBC: CPT

## 2022-11-15 PROCEDURE — 82043 UR ALBUMIN QUANTITATIVE: CPT

## 2022-11-15 PROCEDURE — 80061 LIPID PANEL: CPT

## 2022-11-15 RX ORDER — MELOXICAM 15 MG/1
TABLET ORAL
Qty: 30 TABLET | Refills: 11 | Status: SHIPPED | OUTPATIENT
Start: 2022-11-15

## 2022-11-17 ENCOUNTER — PATIENT MESSAGE (OUTPATIENT)
Dept: FAMILY MEDICINE CLINIC | Facility: CLINIC | Age: 62
End: 2022-11-17

## 2022-11-18 LAB
CCP IGG SERPL-ACNC: 0.6 U/ML (ref 0–6.9)
DSDNA IGG SERPL IA-ACNC: 1.6 IU/ML
ENA AB SER QL IA: 0.1 UG/L
ENA AB SER QL IA: NEGATIVE

## 2022-12-05 ENCOUNTER — OFFICE VISIT (OUTPATIENT)
Dept: ELECTROPHYSIOLOGY | Facility: HOSPITAL | Age: 62
End: 2022-12-05
Attending: Other
Payer: COMMERCIAL

## 2022-12-05 DIAGNOSIS — G62.9 NEUROPATHY: ICD-10-CM

## 2022-12-05 PROCEDURE — 95886 MUSC TEST DONE W/N TEST COMP: CPT | Performed by: OTHER

## 2022-12-05 PROCEDURE — 95911 NRV CNDJ TEST 9-10 STUDIES: CPT | Performed by: OTHER

## 2022-12-13 ENCOUNTER — LAB ENCOUNTER (OUTPATIENT)
Dept: LAB | Age: 62
End: 2022-12-13
Attending: PHYSICIAN ASSISTANT
Payer: COMMERCIAL

## 2022-12-13 ENCOUNTER — HOSPITAL ENCOUNTER (OUTPATIENT)
Dept: GENERAL RADIOLOGY | Age: 62
Discharge: HOME | End: 2022-12-13
Attending: PHYSICIAN ASSISTANT
Payer: COMMERCIAL

## 2022-12-13 ENCOUNTER — EKG ENCOUNTER (OUTPATIENT)
Dept: LAB | Age: 62
End: 2022-12-13
Attending: PHYSICIAN ASSISTANT
Payer: COMMERCIAL

## 2022-12-13 ENCOUNTER — TELEPHONE (OUTPATIENT)
Dept: FAMILY MEDICINE CLINIC | Facility: CLINIC | Age: 62
End: 2022-12-13

## 2022-12-13 DIAGNOSIS — M25.541 ARTHRALGIA OF BOTH HANDS: ICD-10-CM

## 2022-12-13 DIAGNOSIS — G56.01 CARPAL TUNNEL SYNDROME ON RIGHT: ICD-10-CM

## 2022-12-13 DIAGNOSIS — M25.542 ARTHRALGIA OF BOTH HANDS: ICD-10-CM

## 2022-12-13 DIAGNOSIS — E11.9 TYPE 2 DIABETES MELLITUS WITHOUT COMPLICATION, WITHOUT LONG-TERM CURRENT USE OF INSULIN (HCC): Primary | ICD-10-CM

## 2022-12-13 DIAGNOSIS — G56.02 CARPAL TUNNEL SYNDROME, LEFT UPPER LIMB: ICD-10-CM

## 2022-12-13 DIAGNOSIS — E03.9 ACQUIRED HYPOTHYROIDISM: ICD-10-CM

## 2022-12-13 DIAGNOSIS — G56.01 CARPAL TUNNEL SYNDROME, RIGHT UPPER LIMB: ICD-10-CM

## 2022-12-13 DIAGNOSIS — G56.02 CARPAL TUNNEL SYNDROME ON LEFT: ICD-10-CM

## 2022-12-13 LAB
ALBUMIN SERPL-MCNC: 4.4 G/DL (ref 3.4–5)
ALBUMIN/GLOB SERPL: 1.6 {RATIO} (ref 1–2)
ALP LIVER SERPL-CCNC: 95 U/L
ALT SERPL-CCNC: 23 U/L
ANION GAP SERPL CALC-SCNC: 8 MMOL/L (ref 0–18)
AST SERPL-CCNC: 13 U/L (ref 15–37)
ATRIAL RATE: 86 BPM
BILIRUB SERPL-MCNC: 0.7 MG/DL (ref 0.1–2)
BUN BLD-MCNC: 11 MG/DL (ref 7–18)
CALCIUM BLD-MCNC: 9.8 MG/DL (ref 8.5–10.1)
CHLORIDE SERPL-SCNC: 105 MMOL/L (ref 98–112)
CO2 SERPL-SCNC: 24 MMOL/L (ref 21–32)
CREAT BLD-MCNC: 0.95 MG/DL
FASTING STATUS PATIENT QL REPORTED: NO
GFR SERPLBLD BASED ON 1.73 SQ M-ARVRAT: 91 ML/MIN/1.73M2 (ref 60–?)
GLOBULIN PLAS-MCNC: 2.7 G/DL (ref 2.8–4.4)
GLUCOSE BLD-MCNC: 146 MG/DL (ref 70–99)
OSMOLALITY SERPL CALC.SUM OF ELEC: 286 MOSM/KG (ref 275–295)
P AXIS: 46 DEGREES
P-R INTERVAL: 182 MS
POTASSIUM SERPL-SCNC: 4.1 MMOL/L (ref 3.5–5.1)
PROT SERPL-MCNC: 7.1 G/DL (ref 6.4–8.2)
Q-T INTERVAL: 334 MS
QRS DURATION: 78 MS
QTC CALCULATION (BEZET): 399 MS
R AXIS: 8 DEGREES
SODIUM SERPL-SCNC: 137 MMOL/L (ref 136–145)
T AXIS: 48 DEGREES
VENTRICULAR RATE: 86 BPM

## 2022-12-13 PROCEDURE — 93010 ELECTROCARDIOGRAM REPORT: CPT | Performed by: INTERNAL MEDICINE

## 2022-12-13 PROCEDURE — 71046 X-RAY EXAM CHEST 2 VIEWS: CPT | Performed by: PHYSICIAN ASSISTANT

## 2022-12-13 PROCEDURE — 80053 COMPREHEN METABOLIC PANEL: CPT

## 2022-12-13 PROCEDURE — 36415 COLL VENOUS BLD VENIPUNCTURE: CPT

## 2022-12-13 PROCEDURE — 93005 ELECTROCARDIOGRAM TRACING: CPT

## 2022-12-13 NOTE — TELEPHONE ENCOUNTER
Future Appointments   Date Time Provider [facility] Nella   12/17/2022  9:30 AM MATI Hudson EMG [address]   12/27/2022 10:00 AM Tom Henson, DO EMG [address]

## 2022-12-13 NOTE — TELEPHONE ENCOUNTER
I left a message for Zaira Deras to call the office. He needs an appt for a Pre-Op for a procedure he is having on 12/20/22. He can be put on Jasmyne Borden schedule if pt is ok with that. The paperwork for the Pre-Op is in the [facility] folder.

## 2022-12-15 NOTE — LETTER
ASTHMA ACTION PLAN for Zeeshan Yang     : 3/22/1960     Date: 2024  Provider:  Alen Henson DO  Phone for doctor or clinic: [facility], [address] [place] [address] 100  [facility] [id number] [phone]           You can use the colors of a traffic light to help learn about your asthma medicines.      1. Green - Go! % of Personal Best Peak Flow Use controller medicine.   Breathing is good  No cough or wheeze  Can work and play Medicine How much to take When to take it          2. Yellow - Caution. 50-79% Personal Best Peak  Flow.  Use reliever medicine to keep an asthma attack from getting bad.   Cough  Wheezing  Tight Chest  Wake up at night Medicine How much to take When to take it    Albuterol inhaler,  2 puffs every four hours as needed.         Additional instructions         3. Red - Stop! Danger!  <50% Personal Best Peak  Flow. Take these medications until  Get help from a doctor   Medicine not helping  Breathing is hard and fast  Nose opens wide  Can't walk  Ribs show  Can't talk well Medicine How much to take When to take it    Take albuterol 2 puffs every 15 minutes and go to the ER or call 911      Additional Instructions If your symptoms do not improve and you cannot contact your doctor, [facility] or call 911 immediately!     [x] Asthma Action Plan reviewed with patient (and caregiver if necessary) and a copy of the plan was given to the patient/caregiver.   [] Asthma Action Plan reviewed with patient (and caregiver if necessary) on the phone and mailed copy to patient or submitted via [employer].     Signatures:  Provider  Alen Henson DO   Patient Caretaker      [facility] Adult Unit Daily Assessment  Nursing Progress Note    Room: [facility]/607-02   Name: Marty Mckenzie   Age: 28 y.o. Gender: male   Dx: Depression with suicidal ideation  Precautions: suicide risk  Inpatient Status: voluntary       SLEEP:  Sleep Quality Good  Sleep Medications:    PRN Sleep Meds:        MEDICAL:  Other PRN Meds:    Med Compliant: Yes  Accu-Chek: No  Oxygen/CPAP/BiPAP: No  CIWA/CINA: No   PAIN Assessment: none  Side Effects from medication: No      Metabolic Screening:  Lab Results   Component Value Date    LABA1C 4.8 12/13/2022     Lab Results   Component Value Date    CHOL 116 (L) 02/07/2022     Lab Results   Component Value Date    TRIG 98 02/07/2022     Lab Results   Component Value Date    HDL 52 (L) 02/07/2022     No components found for: LDLCAL  No results found for: LABVLDL  Body mass index is 18.83 kg/m². BP Readings from Last 2 Encounters:   12/15/22 118/70   09/09/22 116/70         Medical Bed:   Is patient in a medical bed? no   If medical bed is in use, has nursing secured room while patient is awake and out of the room? NA  Has safety checks by nursing been completed on the bed/room this shift? NA    Protective Factors:  Patient identifies protective factors with nursing staff as follows: Identifies reasons for living: Yes   Supportive Social Network or family: No    Belief that suicide is immoral/high spirituality: Yes   Responsibility to family or others/living with family: No   Fear of death or dying due to pain and suffering: Yes   Engaged in work or school: No     If Patient is unable to identify, reason why? PSYCH:  Depression: better   Anxiety: better  SI denies suicidal ideation   Risk of Suicide: No Risk  HI Negative for homicidal ideation      AVH:no If Hallucinations are present, describe?          GENERAL:  Appetite: good   Percent Meals: 75%   Social: Yes   Speech: normal   Appearance: appropriately dressed and healthy looking    GROUP:  Group Participation: Yes  Participation Quality: Active Listener    Notes: Patient is awake and in the day area for breakfast and medications. He is anticipating discharge today. Attending and participating is groups. He met with his provider and discharge is noted. DISCHARGED TO [facility].        Electronically signed by Collie Severe, RN on 12/15/22 at 1:16 PM CST

## 2022-12-17 ENCOUNTER — OFFICE VISIT (OUTPATIENT)
Dept: FAMILY MEDICINE CLINIC | Facility: CLINIC | Age: 62
End: 2022-12-17
Payer: COMMERCIAL

## 2022-12-17 VITALS
TEMPERATURE: 98 F | HEART RATE: 80 BPM | WEIGHT: 193 LBS | HEIGHT: 71 IN | OXYGEN SATURATION: 98 % | RESPIRATION RATE: 16 BRPM | SYSTOLIC BLOOD PRESSURE: 124 MMHG | BODY MASS INDEX: 27.02 KG/M2 | DIASTOLIC BLOOD PRESSURE: 60 MMHG

## 2022-12-17 DIAGNOSIS — R01.1 MURMUR, CARDIAC: ICD-10-CM

## 2022-12-17 DIAGNOSIS — E11.9 TYPE 2 DIABETES MELLITUS WITHOUT COMPLICATION, UNSPECIFIED WHETHER LONG TERM INSULIN USE (HCC): ICD-10-CM

## 2022-12-17 DIAGNOSIS — Z99.89 OSA ON CPAP: ICD-10-CM

## 2022-12-17 DIAGNOSIS — F32.A DEPRESSION, UNSPECIFIED DEPRESSION TYPE: ICD-10-CM

## 2022-12-17 DIAGNOSIS — E11.69 DYSLIPIDEMIA ASSOCIATED WITH TYPE 2 DIABETES MELLITUS (HCC): ICD-10-CM

## 2022-12-17 DIAGNOSIS — I10 ESSENTIAL HYPERTENSION: ICD-10-CM

## 2022-12-17 DIAGNOSIS — E78.5 DYSLIPIDEMIA ASSOCIATED WITH TYPE 2 DIABETES MELLITUS (HCC): ICD-10-CM

## 2022-12-17 DIAGNOSIS — G47.33 OSA ON CPAP: ICD-10-CM

## 2022-12-17 DIAGNOSIS — Z00.00 ROUTINE HISTORY AND PHYSICAL EXAMINATION OF ADULT: Primary | ICD-10-CM

## 2022-12-17 DIAGNOSIS — J45.20 MILD INTERMITTENT ASTHMA WITHOUT COMPLICATION: ICD-10-CM

## 2022-12-17 RX ORDER — HYDROCODONE BITARTRATE AND ACETAMINOPHEN 5; 325 MG/1; MG/1
TABLET ORAL
COMMUNITY
Start: 2022-12-16

## 2022-12-17 RX ORDER — HYDROCODONE BITARTRATE AND ACETAMINOPHEN 5; 325 MG/1; MG/1
1 TABLET ORAL
COMMUNITY
Start: 2022-12-16

## 2022-12-19 ENCOUNTER — TELEPHONE (OUTPATIENT)
Dept: FAMILY MEDICINE CLINIC | Facility: CLINIC | Age: 62
End: 2022-12-19

## 2022-12-19 NOTE — TELEPHONE ENCOUNTER
Office requesting recent notes and EKG from office visit on 12-17, can fax to [phone]. Scheduled for surgery tomorrow 12-20.

## 2023-02-27 ENCOUNTER — LAB ENCOUNTER (OUTPATIENT)
Dept: LAB | Age: 63
End: 2023-02-27
Attending: NURSE PRACTITIONER
Payer: COMMERCIAL

## 2023-02-27 DIAGNOSIS — E11.00 TYPE II DIABETES MELLITUS WITH HYPEROSMOLARITY, UNCONTROLLED (HCC): Primary | ICD-10-CM

## 2023-02-27 DIAGNOSIS — E11.65 TYPE II DIABETES MELLITUS WITH HYPEROSMOLARITY, UNCONTROLLED (HCC): Primary | ICD-10-CM

## 2023-02-27 LAB
EST. AVERAGE GLUCOSE BLD GHB EST-MCNC: 160 MG/DL (ref 68–126)
HBA1C MFR BLD: 7.2 % (ref ?–5.7)

## 2023-02-27 PROCEDURE — 36415 COLL VENOUS BLD VENIPUNCTURE: CPT

## 2023-02-27 PROCEDURE — 83036 HEMOGLOBIN GLYCOSYLATED A1C: CPT

## 2023-02-27 PROCEDURE — 3051F HG A1C>EQUAL 7.0%<8.0%: CPT | Performed by: FAMILY MEDICINE

## 2023-03-14 ENCOUNTER — OFFICE VISIT (OUTPATIENT)
Facility: CLINIC | Age: 63
End: 2023-03-14
Payer: COMMERCIAL

## 2023-03-14 ENCOUNTER — TELEPHONE (OUTPATIENT)
Facility: CLINIC | Age: 63
End: 2023-03-14

## 2023-03-14 VITALS
HEIGHT: 71 IN | RESPIRATION RATE: 18 BRPM | DIASTOLIC BLOOD PRESSURE: 66 MMHG | WEIGHT: 186.38 LBS | BODY MASS INDEX: 26.09 KG/M2 | OXYGEN SATURATION: 97 % | HEART RATE: 88 BPM | SYSTOLIC BLOOD PRESSURE: 114 MMHG

## 2023-03-14 DIAGNOSIS — I65.29 STENOSIS OF CAROTID ARTERY, UNSPECIFIED LATERALITY: ICD-10-CM

## 2023-03-14 DIAGNOSIS — Z99.89 OSA ON CPAP: Primary | ICD-10-CM

## 2023-03-14 DIAGNOSIS — I10 ESSENTIAL HYPERTENSION: ICD-10-CM

## 2023-03-14 DIAGNOSIS — I51.89 DIASTOLIC DYSFUNCTION: ICD-10-CM

## 2023-03-14 DIAGNOSIS — G47.33 OSA ON CPAP: Primary | ICD-10-CM

## 2023-03-14 DIAGNOSIS — G62.9 NEUROPATHY: ICD-10-CM

## 2023-03-14 DIAGNOSIS — G47.33 OSA (OBSTRUCTIVE SLEEP APNEA): Primary | ICD-10-CM

## 2023-03-14 PROCEDURE — 3074F SYST BP LT 130 MM HG: CPT | Performed by: OTHER

## 2023-03-14 PROCEDURE — 3008F BODY MASS INDEX DOCD: CPT | Performed by: OTHER

## 2023-03-14 PROCEDURE — 3078F DIAST BP <80 MM HG: CPT | Performed by: OTHER

## 2023-03-14 PROCEDURE — 99204 OFFICE O/P NEW MOD 45 MIN: CPT | Performed by: OTHER

## 2023-03-14 NOTE — TELEPHONE ENCOUNTER
Pt notified cpap machine ordered to [address] [place] [address]. DME will verify insurance and once approved will contact pt to arrange delivery and instructions. Pt instructed to follow up with Dr. Kylah Love once pt starts PAP therapy per insurance compliance requirement. Pt verbalized understanding of instructions and agrees with the plan.

## 2023-04-17 ENCOUNTER — TELEPHONE (OUTPATIENT)
Dept: FAMILY MEDICINE CLINIC | Facility: CLINIC | Age: 63
End: 2023-04-17

## 2023-04-17 DIAGNOSIS — E11.9 TYPE 2 DIABETES MELLITUS WITHOUT COMPLICATION, WITHOUT LONG-TERM CURRENT USE OF INSULIN (HCC): ICD-10-CM

## 2023-04-17 DIAGNOSIS — I10 ESSENTIAL HYPERTENSION: ICD-10-CM

## 2023-04-17 DIAGNOSIS — E78.2 MIXED HYPERLIPIDEMIA: ICD-10-CM

## 2023-04-17 RX ORDER — ATORVASTATIN CALCIUM 40 MG/1
40 TABLET, FILM COATED ORAL NIGHTLY
Qty: 30 TABLET | Refills: 0 | Status: SHIPPED | OUTPATIENT
Start: 2023-04-17 | End: 2023-04-17

## 2023-04-17 RX ORDER — ATORVASTATIN CALCIUM 40 MG/1
TABLET, FILM COATED ORAL
Qty: 90 TABLET | Refills: 0 | Status: SHIPPED | OUTPATIENT
Start: 2023-04-17

## 2023-04-17 RX ORDER — LOSARTAN POTASSIUM 50 MG/1
TABLET ORAL
Qty: 90 TABLET | Refills: 0 | Status: SHIPPED | OUTPATIENT
Start: 2023-04-17

## 2023-04-17 RX ORDER — LOSARTAN POTASSIUM 50 MG/1
50 TABLET ORAL DAILY
Qty: 30 TABLET | Refills: 0 | Status: SHIPPED | OUTPATIENT
Start: 2023-04-17 | End: 2023-04-17

## 2023-04-17 NOTE — TELEPHONE ENCOUNTER
Patient requesting refills on metFORMIN HCl 1000 MG Oral Tab,atorvastatin 40 MG Oral Tab and escitalopram 10 MG Oral Tab. Patient currently out of [place], please send refills to [place] in [place].

## 2023-04-18 DIAGNOSIS — E11.9 TYPE 2 DIABETES MELLITUS WITHOUT COMPLICATION, WITHOUT LONG-TERM CURRENT USE OF INSULIN (HCC): ICD-10-CM

## 2023-04-18 DIAGNOSIS — F32.A DEPRESSION, UNSPECIFIED DEPRESSION TYPE: ICD-10-CM

## 2023-04-18 NOTE — TELEPHONE ENCOUNTER
Patient requesting escitalopram 10 MG Oral Tab, patient still in [place]. Patient will need short term supply, will be returning this weekend. Patient requesting to use pharmacy in [place].

## 2023-04-19 RX ORDER — ESCITALOPRAM OXALATE 10 MG/1
10 TABLET ORAL DAILY
Qty: 30 TABLET | Refills: 0 | Status: SHIPPED | OUTPATIENT
Start: 2023-04-19

## 2023-05-15 ENCOUNTER — EKG ENCOUNTER (OUTPATIENT)
Dept: LAB | Age: 63
End: 2023-05-15
Attending: ORTHOPAEDIC SURGERY
Payer: COMMERCIAL

## 2023-05-15 ENCOUNTER — LAB ENCOUNTER (OUTPATIENT)
Dept: LAB | Age: 63
End: 2023-05-15
Attending: ORTHOPAEDIC SURGERY
Payer: COMMERCIAL

## 2023-05-15 DIAGNOSIS — E03.8 TOXIC DIFFUSE GOITER WITH PRETIBIAL MYXEDEMA: ICD-10-CM

## 2023-05-15 DIAGNOSIS — E23.6 RATHKE'S POUCH CYST (HCC): Primary | ICD-10-CM

## 2023-05-15 DIAGNOSIS — E05.00 TOXIC DIFFUSE GOITER WITH PRETIBIAL MYXEDEMA: ICD-10-CM

## 2023-05-15 DIAGNOSIS — G56.02 CARPAL TUNNEL SYNDROME ON LEFT: ICD-10-CM

## 2023-05-15 DIAGNOSIS — G56.02 CARPAL TUNNEL SYNDROME ON LEFT: Primary | ICD-10-CM

## 2023-05-15 LAB
ALBUMIN SERPL-MCNC: 4.3 G/DL (ref 3.4–5)
ALBUMIN/GLOB SERPL: 1.4 {RATIO} (ref 1–2)
ALP LIVER SERPL-CCNC: 93 U/L
ALT SERPL-CCNC: 24 U/L
ANION GAP SERPL CALC-SCNC: 4 MMOL/L (ref 0–18)
AST SERPL-CCNC: 16 U/L (ref 15–37)
ATRIAL RATE: 77 BPM
BILIRUB SERPL-MCNC: 0.8 MG/DL (ref 0.1–2)
BUN BLD-MCNC: 14 MG/DL (ref 7–18)
CALCIUM BLD-MCNC: 9.3 MG/DL (ref 8.5–10.1)
CHLORIDE SERPL-SCNC: 105 MMOL/L (ref 98–112)
CO2 SERPL-SCNC: 28 MMOL/L (ref 21–32)
CREAT BLD-MCNC: 0.86 MG/DL
FASTING STATUS PATIENT QL REPORTED: YES
GFR SERPLBLD BASED ON 1.73 SQ M-ARVRAT: 98 ML/MIN/1.73M2 (ref 60–?)
GLOBULIN PLAS-MCNC: 3.1 G/DL (ref 2.8–4.4)
GLUCOSE BLD-MCNC: 131 MG/DL (ref 70–99)
OSMOLALITY SERPL CALC.SUM OF ELEC: 286 MOSM/KG (ref 275–295)
P AXIS: 46 DEGREES
P-R INTERVAL: 186 MS
POTASSIUM SERPL-SCNC: 4 MMOL/L (ref 3.5–5.1)
PROT SERPL-MCNC: 7.4 G/DL (ref 6.4–8.2)
Q-T INTERVAL: 348 MS
QRS DURATION: 82 MS
QTC CALCULATION (BEZET): 393 MS
R AXIS: 8 DEGREES
SODIUM SERPL-SCNC: 137 MMOL/L (ref 136–145)
T AXIS: 47 DEGREES
T4 FREE SERPL-MCNC: 0.7 NG/DL (ref 0.8–1.7)
TSI SER-ACNC: 1.82 MIU/ML (ref 0.36–3.74)
VENTRICULAR RATE: 77 BPM

## 2023-05-15 PROCEDURE — 84439 ASSAY OF FREE THYROXINE: CPT

## 2023-05-15 PROCEDURE — 93005 ELECTROCARDIOGRAM TRACING: CPT

## 2023-05-15 PROCEDURE — 36415 COLL VENOUS BLD VENIPUNCTURE: CPT

## 2023-05-15 PROCEDURE — 84443 ASSAY THYROID STIM HORMONE: CPT

## 2023-05-15 PROCEDURE — 93010 ELECTROCARDIOGRAM REPORT: CPT | Performed by: INTERNAL MEDICINE

## 2023-05-15 PROCEDURE — 80053 COMPREHEN METABOLIC PANEL: CPT

## 2023-05-19 DIAGNOSIS — F32.A DEPRESSION, UNSPECIFIED DEPRESSION TYPE: ICD-10-CM

## 2023-05-19 RX ORDER — ESCITALOPRAM OXALATE 10 MG/1
10 TABLET ORAL DAILY
Qty: 90 TABLET | Refills: 0 | Status: SHIPPED | OUTPATIENT
Start: 2023-05-19

## 2023-05-19 NOTE — TELEPHONE ENCOUNTER
Fax received from [facility] requesting r/f on escitalopram.    Future Appointments   Date Time Provider Trace Carmen   5/22/2023  8:00 AM Alea John, APRN EMG [address]     LOV: 12/17/22  Last r/f: 4/19/23 # 30 0 r/fs  Labs: 5/15/23       Please advise

## 2023-05-22 ENCOUNTER — TELEPHONE (OUTPATIENT)
Dept: FAMILY MEDICINE CLINIC | Facility: CLINIC | Age: 63
End: 2023-05-22

## 2023-05-22 ENCOUNTER — OFFICE VISIT (OUTPATIENT)
Dept: FAMILY MEDICINE CLINIC | Facility: CLINIC | Age: 63
End: 2023-05-22
Payer: COMMERCIAL

## 2023-05-22 VITALS
DIASTOLIC BLOOD PRESSURE: 66 MMHG | BODY MASS INDEX: 26.18 KG/M2 | HEART RATE: 83 BPM | WEIGHT: 187 LBS | TEMPERATURE: 97 F | SYSTOLIC BLOOD PRESSURE: 108 MMHG | RESPIRATION RATE: 16 BRPM | OXYGEN SATURATION: 97 % | HEIGHT: 71 IN

## 2023-05-22 DIAGNOSIS — Z01.818 PREOP EXAMINATION: Primary | ICD-10-CM

## 2023-05-22 PROCEDURE — 3074F SYST BP LT 130 MM HG: CPT | Performed by: FAMILY MEDICINE

## 2023-05-22 PROCEDURE — 99214 OFFICE O/P EST MOD 30 MIN: CPT | Performed by: FAMILY MEDICINE

## 2023-05-22 PROCEDURE — 3078F DIAST BP <80 MM HG: CPT | Performed by: FAMILY MEDICINE

## 2023-05-22 PROCEDURE — 3008F BODY MASS INDEX DOCD: CPT | Performed by: FAMILY MEDICINE

## 2023-05-22 NOTE — TELEPHONE ENCOUNTER
Zaira from [facility], nurse in pre-admission, looking for clearance for pts surgery tomorrow. Fax to [phone].

## 2023-05-22 NOTE — TELEPHONE ENCOUNTER
Pam Howard with [facility] is calling to request EKG, Labs, and clearance note.       Surgery is tomorrow at [facility]..    Please fax to [phone]

## 2023-07-13 ENCOUNTER — TELEPHONE (OUTPATIENT)
Dept: FAMILY MEDICINE CLINIC | Facility: CLINIC | Age: 63
End: 2023-07-13

## 2023-07-13 DIAGNOSIS — E03.9 ACQUIRED HYPOTHYROIDISM: ICD-10-CM

## 2023-07-13 DIAGNOSIS — E78.2 MIXED HYPERLIPIDEMIA: ICD-10-CM

## 2023-07-13 DIAGNOSIS — E11.9 TYPE 2 DIABETES MELLITUS WITHOUT COMPLICATION, UNSPECIFIED WHETHER LONG TERM INSULIN USE (HCC): Primary | ICD-10-CM

## 2023-07-13 DIAGNOSIS — I10 ESSENTIAL HYPERTENSION: ICD-10-CM

## 2023-07-13 NOTE — TELEPHONE ENCOUNTER
Future Appointments   Date Time Provider Trace Cannoni   7/24/2023  9:30 AM Gerhard Benitez MD EMG [address]     Patient scheduled DM follow up, patient requesting lab orders to do prior to visit.  Please advise

## 2023-07-17 ENCOUNTER — LAB ENCOUNTER (OUTPATIENT)
Dept: LAB | Age: 63
End: 2023-07-17
Attending: FAMILY MEDICINE
Payer: COMMERCIAL

## 2023-07-17 DIAGNOSIS — I10 ESSENTIAL HYPERTENSION: ICD-10-CM

## 2023-07-17 DIAGNOSIS — E03.9 ACQUIRED HYPOTHYROIDISM: ICD-10-CM

## 2023-07-17 DIAGNOSIS — E11.9 TYPE 2 DIABETES MELLITUS WITHOUT COMPLICATION, UNSPECIFIED WHETHER LONG TERM INSULIN USE (HCC): ICD-10-CM

## 2023-07-17 DIAGNOSIS — E78.2 MIXED HYPERLIPIDEMIA: ICD-10-CM

## 2023-07-17 LAB
ALBUMIN SERPL-MCNC: 4.2 G/DL (ref 3.4–5)
ALBUMIN/GLOB SERPL: 1.4 {RATIO} (ref 1–2)
ALP LIVER SERPL-CCNC: 86 U/L
ALT SERPL-CCNC: 22 U/L
ANION GAP SERPL CALC-SCNC: 6 MMOL/L (ref 0–18)
AST SERPL-CCNC: 14 U/L (ref 15–37)
BILIRUB SERPL-MCNC: 0.6 MG/DL (ref 0.1–2)
BUN BLD-MCNC: 13 MG/DL (ref 7–18)
CALCIUM BLD-MCNC: 9.2 MG/DL (ref 8.5–10.1)
CHLORIDE SERPL-SCNC: 105 MMOL/L (ref 98–112)
CHOLEST SERPL-MCNC: 108 MG/DL (ref ?–200)
CO2 SERPL-SCNC: 24 MMOL/L (ref 21–32)
CREAT BLD-MCNC: 0.77 MG/DL
CREAT UR-SCNC: 132 MG/DL
EST. AVERAGE GLUCOSE BLD GHB EST-MCNC: 163 MG/DL (ref 68–126)
FASTING PATIENT LIPID ANSWER: YES
FASTING STATUS PATIENT QL REPORTED: YES
GFR SERPLBLD BASED ON 1.73 SQ M-ARVRAT: 101 ML/MIN/1.73M2 (ref 60–?)
GLOBULIN PLAS-MCNC: 2.9 G/DL (ref 2.8–4.4)
GLUCOSE BLD-MCNC: 116 MG/DL (ref 70–99)
HBA1C MFR BLD: 7.3 % (ref ?–5.7)
HDLC SERPL-MCNC: 46 MG/DL (ref 40–59)
LDLC SERPL CALC-MCNC: 45 MG/DL (ref ?–100)
MICROALBUMIN UR-MCNC: 1.43 MG/DL
MICROALBUMIN/CREAT 24H UR-RTO: 10.8 UG/MG (ref ?–30)
NONHDLC SERPL-MCNC: 62 MG/DL (ref ?–130)
OSMOLALITY SERPL CALC.SUM OF ELEC: 281 MOSM/KG (ref 275–295)
POTASSIUM SERPL-SCNC: 4 MMOL/L (ref 3.5–5.1)
PROT SERPL-MCNC: 7.1 G/DL (ref 6.4–8.2)
SODIUM SERPL-SCNC: 135 MMOL/L (ref 136–145)
TRIGL SERPL-MCNC: 84 MG/DL (ref 30–149)
TSI SER-ACNC: 2.22 MIU/ML (ref 0.36–3.74)
VLDLC SERPL CALC-MCNC: 12 MG/DL (ref 0–30)

## 2023-07-17 PROCEDURE — 82570 ASSAY OF URINE CREATININE: CPT

## 2023-07-17 PROCEDURE — 3051F HG A1C>EQUAL 7.0%<8.0%: CPT | Performed by: FAMILY MEDICINE

## 2023-07-17 PROCEDURE — 82043 UR ALBUMIN QUANTITATIVE: CPT

## 2023-07-17 PROCEDURE — 80053 COMPREHEN METABOLIC PANEL: CPT

## 2023-07-17 PROCEDURE — 84443 ASSAY THYROID STIM HORMONE: CPT

## 2023-07-17 PROCEDURE — 3061F NEG MICROALBUMINURIA REV: CPT | Performed by: FAMILY MEDICINE

## 2023-07-17 PROCEDURE — 80061 LIPID PANEL: CPT

## 2023-07-17 PROCEDURE — 83036 HEMOGLOBIN GLYCOSYLATED A1C: CPT

## 2023-07-17 PROCEDURE — 36415 COLL VENOUS BLD VENIPUNCTURE: CPT

## 2023-08-19 DIAGNOSIS — F32.A DEPRESSION, UNSPECIFIED DEPRESSION TYPE: ICD-10-CM

## 2023-08-21 RX ORDER — ESCITALOPRAM OXALATE 10 MG/1
10 TABLET ORAL DAILY
Qty: 90 TABLET | Refills: 1 | Status: SHIPPED | OUTPATIENT
Start: 2023-08-21

## 2023-08-21 NOTE — TELEPHONE ENCOUNTER
Requested Renewals     Name from pharmacy: Escitalopram Oxalate 10 MG Oral Tablet         Will file in chart as: ESCITALOPRAM 10 MG Oral Tab    Sig: TAKE 1 TABLET BY MOUTH DAILY    Disp: 90 tablet    Refills: 3    Start: 8/19/2023    Class: Normal    Non-formulary For: Depression, unspecified depression type    To pharmacy: Please send a replace/new response with 90-Day Supply if appropriate to maximize member benefit. Requesting 1 year supply. Future Appointments   Date Time Provider Trace Carmen   8/28/2023 10:30 AM Myra Menjivar DO EMG [address]     LOV: 5/22/23 for pre-op exam  Labs done 7/17/23  Last refill given on 5/19/23 for #90    -medication pended for review.

## 2023-08-28 ENCOUNTER — TELEPHONE (OUTPATIENT)
Dept: FAMILY MEDICINE CLINIC | Facility: CLINIC | Age: 63
End: 2023-08-28

## 2023-08-28 ENCOUNTER — OFFICE VISIT (OUTPATIENT)
Dept: FAMILY MEDICINE CLINIC | Facility: CLINIC | Age: 63
End: 2023-08-28
Payer: COMMERCIAL

## 2023-08-28 VITALS
TEMPERATURE: 98 F | BODY MASS INDEX: 25.56 KG/M2 | DIASTOLIC BLOOD PRESSURE: 72 MMHG | HEIGHT: 71 IN | HEART RATE: 107 BPM | WEIGHT: 182.56 LBS | OXYGEN SATURATION: 98 % | SYSTOLIC BLOOD PRESSURE: 108 MMHG | RESPIRATION RATE: 18 BRPM

## 2023-08-28 DIAGNOSIS — K29.50 CHRONIC GASTRITIS, PRESENCE OF BLEEDING UNSPECIFIED, UNSPECIFIED GASTRITIS TYPE: ICD-10-CM

## 2023-08-28 DIAGNOSIS — E11.9 TYPE 2 DIABETES MELLITUS WITHOUT COMPLICATION, WITHOUT LONG-TERM CURRENT USE OF INSULIN (HCC): Primary | ICD-10-CM

## 2023-08-28 DIAGNOSIS — E78.2 MIXED HYPERLIPIDEMIA: ICD-10-CM

## 2023-08-28 DIAGNOSIS — Z12.5 PROSTATE CANCER SCREENING: ICD-10-CM

## 2023-08-28 DIAGNOSIS — F32.A DEPRESSION, UNSPECIFIED DEPRESSION TYPE: ICD-10-CM

## 2023-08-28 DIAGNOSIS — Z23 NEED FOR PNEUMOCOCCAL 20-VALENT CONJUGATE VACCINATION: ICD-10-CM

## 2023-08-28 DIAGNOSIS — G62.9 NEUROPATHY: ICD-10-CM

## 2023-08-28 DIAGNOSIS — J45.20 MILD INTERMITTENT ASTHMA WITHOUT COMPLICATION: ICD-10-CM

## 2023-08-28 PROCEDURE — 3072F LOW RISK FOR RETINOPATHY: CPT | Performed by: FAMILY MEDICINE

## 2023-08-28 PROCEDURE — 3078F DIAST BP <80 MM HG: CPT | Performed by: FAMILY MEDICINE

## 2023-08-28 PROCEDURE — 90471 IMMUNIZATION ADMIN: CPT | Performed by: FAMILY MEDICINE

## 2023-08-28 PROCEDURE — 3074F SYST BP LT 130 MM HG: CPT | Performed by: FAMILY MEDICINE

## 2023-08-28 PROCEDURE — 99214 OFFICE O/P EST MOD 30 MIN: CPT | Performed by: FAMILY MEDICINE

## 2023-08-28 PROCEDURE — 90677 PCV20 VACCINE IM: CPT | Performed by: FAMILY MEDICINE

## 2023-08-28 PROCEDURE — 3008F BODY MASS INDEX DOCD: CPT | Performed by: FAMILY MEDICINE

## 2023-08-28 RX ORDER — OMEPRAZOLE 20 MG/1
20 CAPSULE, DELAYED RELEASE ORAL DAILY
Qty: 90 CAPSULE | Refills: 1 | Status: CANCELLED
Start: 2023-08-28

## 2023-08-28 RX ORDER — ALBUTEROL SULFATE 90 UG/1
2 AEROSOL, METERED RESPIRATORY (INHALATION) EVERY 4 HOURS PRN
Qty: 1 EACH | Refills: 3 | Status: CANCELLED
Start: 2023-08-28

## 2023-08-28 RX ORDER — ATORVASTATIN CALCIUM 40 MG/1
40 TABLET, FILM COATED ORAL NIGHTLY
Qty: 90 TABLET | Refills: 1 | Status: CANCELLED
Start: 2023-08-28

## 2023-08-28 RX ORDER — GABAPENTIN 300 MG/1
300 CAPSULE ORAL NIGHTLY
Qty: 90 CAPSULE | Refills: 0 | Status: SHIPPED | OUTPATIENT
Start: 2023-08-28 | End: 2023-11-26

## 2023-08-28 RX ORDER — SEMAGLUTIDE 1.34 MG/ML
INJECTION, SOLUTION SUBCUTANEOUS
Qty: 9 ML | Refills: 3 | Status: CANCELLED
Start: 2023-08-28

## 2023-10-01 DIAGNOSIS — E11.9 TYPE 2 DIABETES MELLITUS WITHOUT COMPLICATION, WITHOUT LONG-TERM CURRENT USE OF INSULIN (HCC): ICD-10-CM

## 2023-10-02 RX ORDER — CANAGLIFLOZIN 100 MG/1
TABLET, FILM COATED ORAL DAILY
Qty: 90 TABLET | Refills: 3 | Status: SHIPPED | OUTPATIENT
Start: 2023-10-02

## 2023-10-02 NOTE — TELEPHONE ENCOUNTER
Name from pharmacy: metFORMIN HCl 1000 MG Oral Tablet         Will file in chart as: METFORMIN HCL 1000 MG Oral Tab    Sig: TAKE 1 TABLET BY MOUTH  TWICE DAILY WITH MEALS    Disp: 180 tablet    Refills: 3    Start: 10/1/2023    Class: Normal    Non-formulary For: Type 2 diabetes mellitus without complication, without long-term current use of insulin ([facility] [address]    To pharmacy: Please send a replace/new response with 90-Day Supply if appropriate to maximize member benefit. Requesting 1 year supply. Last ordered: 5 months ago (4/17/2023) by Nadine Martin DO    Last refill: 8/7/2023    Rx #: [id number]    Diabetic Medication Protocol [id number]/01/2023 09:21 PM   Protocol Details HgBA1C procedure resulted in past 6 months    Last HgBA1C < 7.5    Microalbumin procedure in past 12 months or taking ACE/ARB    Appointment in past 6 or next 3 months       Name from pharmacy: [address] [place] [address]         Will file in chart as: INVOKANA 100 MG Oral Tab    Sig: TAKE 1 TABLET BY MOUTH DAILY    Disp: 90 tablet    Refills: 3    Start: 10/1/2023    Class: Normal    Non-formulary For: Type 2 diabetes mellitus without complication, without long-term current use of insulin ([facility] [address]    To pharmacy: Please send a replace/new response with 90-Day Supply if appropriate to maximize member benefit. Requesting 1 year supply.     Last ordered: 11 months ago (10/24/2022) by Nadine Martin DO    Last refill: 8/7/2023    Rx #: [id number]    Diabetic Medication Protocol [id number]/01/2023 09:21 PM   Protocol Details HgBA1C procedure resulted in past 6 months    Last HgBA1C < 7.5    Microalbumin procedure in past 12 months or taking ACE/ARB    Appointment in past 6 or next 3 months      To be filled at: [facility], [address] [phone], [phone]

## 2023-10-16 ENCOUNTER — OFFICE VISIT (OUTPATIENT)
Dept: FAMILY MEDICINE CLINIC | Facility: CLINIC | Age: 63
End: 2023-10-16
Payer: COMMERCIAL

## 2023-10-16 VITALS
HEART RATE: 98 BPM | WEIGHT: 180 LBS | RESPIRATION RATE: 16 BRPM | DIASTOLIC BLOOD PRESSURE: 60 MMHG | SYSTOLIC BLOOD PRESSURE: 110 MMHG | OXYGEN SATURATION: 98 % | TEMPERATURE: 98 F | BODY MASS INDEX: 25.2 KG/M2 | HEIGHT: 71 IN

## 2023-10-16 DIAGNOSIS — Z23 NEED FOR IMMUNIZATION AGAINST INFLUENZA: ICD-10-CM

## 2023-10-16 DIAGNOSIS — G62.9 NEUROPATHY: ICD-10-CM

## 2023-10-16 DIAGNOSIS — Z12.5 PROSTATE CANCER SCREENING: ICD-10-CM

## 2023-10-16 DIAGNOSIS — E11.40 TYPE 2 DIABETES MELLITUS WITH DIABETIC NEUROPATHY, WITHOUT LONG-TERM CURRENT USE OF INSULIN (HCC): ICD-10-CM

## 2023-10-16 DIAGNOSIS — B00.1 COLD SORE: ICD-10-CM

## 2023-10-16 DIAGNOSIS — F32.A DEPRESSION, UNSPECIFIED DEPRESSION TYPE: ICD-10-CM

## 2023-10-16 DIAGNOSIS — I10 ESSENTIAL HYPERTENSION: ICD-10-CM

## 2023-10-16 DIAGNOSIS — Z00.00 WELL ADULT EXAM: Primary | ICD-10-CM

## 2023-10-16 DIAGNOSIS — E03.9 ACQUIRED HYPOTHYROIDISM: ICD-10-CM

## 2023-10-16 PROBLEM — M62.08 DIASTASIS OF RECTUS ABDOMINIS: Status: RESOLVED | Noted: 2018-09-26 | Resolved: 2023-10-16

## 2023-10-16 PROBLEM — M54.32 SCIATICA, LEFT SIDE: Status: RESOLVED | Noted: 2018-09-26 | Resolved: 2023-10-16

## 2023-10-16 PROCEDURE — 3008F BODY MASS INDEX DOCD: CPT | Performed by: FAMILY MEDICINE

## 2023-10-16 PROCEDURE — 90686 IIV4 VACC NO PRSV 0.5 ML IM: CPT | Performed by: FAMILY MEDICINE

## 2023-10-16 PROCEDURE — 90471 IMMUNIZATION ADMIN: CPT | Performed by: FAMILY MEDICINE

## 2023-10-16 PROCEDURE — 99396 PREV VISIT EST AGE 40-64: CPT | Performed by: FAMILY MEDICINE

## 2023-10-16 PROCEDURE — 3078F DIAST BP <80 MM HG: CPT | Performed by: FAMILY MEDICINE

## 2023-10-16 PROCEDURE — 3074F SYST BP LT 130 MM HG: CPT | Performed by: FAMILY MEDICINE

## 2023-10-16 RX ORDER — SEMAGLUTIDE 2.68 MG/ML
2 INJECTION, SOLUTION SUBCUTANEOUS WEEKLY
COMMUNITY
Start: 2023-09-30

## 2023-10-27 DIAGNOSIS — I10 ESSENTIAL HYPERTENSION: ICD-10-CM

## 2023-10-30 RX ORDER — LOSARTAN POTASSIUM 50 MG/1
50 TABLET ORAL DAILY
Qty: 90 TABLET | Refills: 3 | Status: SHIPPED | OUTPATIENT
Start: 2023-10-30

## 2023-11-03 DIAGNOSIS — G62.9 NEUROPATHY: ICD-10-CM

## 2023-11-03 RX ORDER — GABAPENTIN 300 MG/1
300 CAPSULE ORAL NIGHTLY
Qty: 90 CAPSULE | Refills: 3 | Status: SHIPPED | OUTPATIENT
Start: 2023-11-03

## 2023-11-03 NOTE — TELEPHONE ENCOUNTER
Patient is requesting a refill on: Gabapentin 300 mg # 90  Last LOV: 10/16/23  Last Refill: 8/28/23  Last relevant labs:RTC:  *Offered to start patient on amitriptyline. Patient states he does not need medication at the moment.    *At [address] states states does not take Gabapentin     Non- protocol Medication  RX pended and routed to provider for approval

## 2023-11-07 ENCOUNTER — TELEPHONE (OUTPATIENT)
Dept: FAMILY MEDICINE CLINIC | Facility: CLINIC | Age: 63
End: 2023-11-07

## 2023-11-07 NOTE — TELEPHONE ENCOUNTER
Pt in [place], says he caught a cough on the plane, has a tickle in his throat and wants something called to:   [facility] in [place], [place] phone # [phone]

## 2023-12-20 DIAGNOSIS — E78.2 MIXED HYPERLIPIDEMIA: ICD-10-CM

## 2023-12-20 RX ORDER — ATORVASTATIN CALCIUM 40 MG/1
40 TABLET, FILM COATED ORAL NIGHTLY
Qty: 90 TABLET | Refills: 1 | Status: SHIPPED | OUTPATIENT
Start: 2023-12-20

## 2023-12-20 NOTE — TELEPHONE ENCOUNTER
Pt needs a refill on ATORVASTATIN 40 MG Oral Tab sent to [facility] for a 90 day supply.     Future Appointments   Date Time Provider Trace Nella   1/16/2024  9:30 AM Binu Hamm DO EMG [address]

## 2024-01-08 ENCOUNTER — LAB ENCOUNTER (OUTPATIENT)
Dept: LAB | Age: 64
End: 2024-01-08
Attending: FAMILY MEDICINE
Payer: COMMERCIAL

## 2024-01-08 DIAGNOSIS — E11.40 TYPE 2 DIABETES MELLITUS WITH DIABETIC NEUROPATHY, WITHOUT LONG-TERM CURRENT USE OF INSULIN (HCC): ICD-10-CM

## 2024-01-08 DIAGNOSIS — Z12.5 PROSTATE CANCER SCREENING: ICD-10-CM

## 2024-01-08 DIAGNOSIS — E11.9 TYPE 2 DIABETES MELLITUS WITHOUT COMPLICATION, WITHOUT LONG-TERM CURRENT USE OF INSULIN (HCC): ICD-10-CM

## 2024-01-08 DIAGNOSIS — Z00.00 WELL ADULT EXAM: ICD-10-CM

## 2024-01-08 LAB
ALBUMIN SERPL-MCNC: 4.1 G/DL (ref 3.4–5)
ALBUMIN/GLOB SERPL: 1.4 {RATIO} (ref 1–2)
ALP LIVER SERPL-CCNC: 95 U/L
ALT SERPL-CCNC: 16 U/L
ANION GAP SERPL CALC-SCNC: 5 MMOL/L (ref 0–18)
AST SERPL-CCNC: 9 U/L (ref 15–37)
BASOPHILS # BLD AUTO: 0.03 X10(3) UL (ref 0–0.2)
BASOPHILS NFR BLD AUTO: 0.7 %
BILIRUB SERPL-MCNC: 0.9 MG/DL (ref 0.1–2)
BUN BLD-MCNC: 12 MG/DL (ref 9–23)
CALCIUM BLD-MCNC: 8.9 MG/DL (ref 8.5–10.1)
CHLORIDE SERPL-SCNC: 107 MMOL/L (ref 98–112)
CHOLEST SERPL-MCNC: 95 MG/DL (ref ?–200)
CO2 SERPL-SCNC: 27 MMOL/L (ref 21–32)
COMPLEXED PSA SERPL-MCNC: 1.27 NG/ML (ref ?–4)
CREAT BLD-MCNC: 1.02 MG/DL
EGFRCR SERPLBLD CKD-EPI 2021: 83 ML/MIN/1.73M2 (ref 60–?)
EOSINOPHIL # BLD AUTO: 0.12 X10(3) UL (ref 0–0.7)
EOSINOPHIL NFR BLD AUTO: 3 %
ERYTHROCYTE [DISTWIDTH] IN BLOOD BY AUTOMATED COUNT: 14.6 %
EST. AVERAGE GLUCOSE BLD GHB EST-MCNC: 157 MG/DL (ref 68–126)
FASTING PATIENT LIPID ANSWER: YES
FASTING STATUS PATIENT QL REPORTED: YES
GLOBULIN PLAS-MCNC: 2.9 G/DL (ref 2.8–4.4)
GLUCOSE BLD-MCNC: 116 MG/DL (ref 70–99)
HBA1C MFR BLD: 7.1 % (ref ?–5.7)
HCT VFR BLD AUTO: 40.2 %
HDLC SERPL-MCNC: 44 MG/DL (ref 40–59)
HGB BLD-MCNC: 13 G/DL
IMM GRANULOCYTES # BLD AUTO: 0.02 X10(3) UL (ref 0–1)
IMM GRANULOCYTES NFR BLD: 0.5 %
LDLC SERPL CALC-MCNC: 34 MG/DL (ref ?–100)
LYMPHOCYTES # BLD AUTO: 0.97 X10(3) UL (ref 1–4)
LYMPHOCYTES NFR BLD AUTO: 24.1 %
MCH RBC QN AUTO: 26.6 PG (ref 26–34)
MCHC RBC AUTO-ENTMCNC: 32.3 G/DL (ref 31–37)
MCV RBC AUTO: 82.4 FL
MONOCYTES # BLD AUTO: 0.36 X10(3) UL (ref 0.1–1)
MONOCYTES NFR BLD AUTO: 8.9 %
NEUTROPHILS # BLD AUTO: 2.53 X10 (3) UL (ref 1.5–7.7)
NEUTROPHILS # BLD AUTO: 2.53 X10(3) UL (ref 1.5–7.7)
NEUTROPHILS NFR BLD AUTO: 62.8 %
NONHDLC SERPL-MCNC: 51 MG/DL (ref ?–130)
OSMOLALITY SERPL CALC.SUM OF ELEC: 289 MOSM/KG (ref 275–295)
PLATELET # BLD AUTO: 202 10(3)UL (ref 150–450)
POTASSIUM SERPL-SCNC: 3.7 MMOL/L (ref 3.5–5.1)
PROT SERPL-MCNC: 7 G/DL (ref 6.4–8.2)
RBC # BLD AUTO: 4.88 X10(6)UL
SODIUM SERPL-SCNC: 139 MMOL/L (ref 136–145)
TRIGL SERPL-MCNC: 86 MG/DL (ref 30–149)
TSI SER-ACNC: 1.77 MIU/ML (ref 0.36–3.74)
VLDLC SERPL CALC-MCNC: 12 MG/DL (ref 0–30)
WBC # BLD AUTO: 4 X10(3) UL (ref 4–11)

## 2024-01-08 PROCEDURE — 84443 ASSAY THYROID STIM HORMONE: CPT

## 2024-01-08 PROCEDURE — 85025 COMPLETE CBC W/AUTO DIFF WBC: CPT

## 2024-01-08 PROCEDURE — 80053 COMPREHEN METABOLIC PANEL: CPT

## 2024-01-08 PROCEDURE — 80061 LIPID PANEL: CPT

## 2024-01-08 PROCEDURE — 3051F HG A1C>EQUAL 7.0%<8.0%: CPT | Performed by: FAMILY MEDICINE

## 2024-01-08 PROCEDURE — 83036 HEMOGLOBIN GLYCOSYLATED A1C: CPT

## 2024-01-08 PROCEDURE — 36415 COLL VENOUS BLD VENIPUNCTURE: CPT

## 2024-01-16 ENCOUNTER — OFFICE VISIT (OUTPATIENT)
Dept: FAMILY MEDICINE CLINIC | Facility: CLINIC | Age: 64
End: 2024-01-16
Payer: COMMERCIAL

## 2024-01-16 VITALS
HEIGHT: 71 IN | HEART RATE: 97 BPM | DIASTOLIC BLOOD PRESSURE: 70 MMHG | TEMPERATURE: 97 F | SYSTOLIC BLOOD PRESSURE: 104 MMHG | RESPIRATION RATE: 18 BRPM | WEIGHT: 180 LBS | BODY MASS INDEX: 25.2 KG/M2 | OXYGEN SATURATION: 97 %

## 2024-01-16 DIAGNOSIS — I10 ESSENTIAL HYPERTENSION: ICD-10-CM

## 2024-01-16 DIAGNOSIS — J45.20 MILD INTERMITTENT ASTHMA WITHOUT COMPLICATION: ICD-10-CM

## 2024-01-16 DIAGNOSIS — G47.33 OSA ON CPAP: ICD-10-CM

## 2024-01-16 DIAGNOSIS — E78.2 MIXED HYPERLIPIDEMIA: ICD-10-CM

## 2024-01-16 DIAGNOSIS — E11.9 TYPE 2 DIABETES MELLITUS WITHOUT COMPLICATION, UNSPECIFIED WHETHER LONG TERM INSULIN USE (HCC): Primary | ICD-10-CM

## 2024-01-16 PROCEDURE — 3078F DIAST BP <80 MM HG: CPT | Performed by: FAMILY MEDICINE

## 2024-01-16 PROCEDURE — 3074F SYST BP LT 130 MM HG: CPT | Performed by: FAMILY MEDICINE

## 2024-01-16 PROCEDURE — 3008F BODY MASS INDEX DOCD: CPT | Performed by: FAMILY MEDICINE

## 2024-01-16 PROCEDURE — 99214 OFFICE O/P EST MOD 30 MIN: CPT | Performed by: FAMILY MEDICINE

## 2024-01-16 RX ORDER — ATORVASTATIN CALCIUM 20 MG/1
20 TABLET, FILM COATED ORAL NIGHTLY
COMMUNITY

## 2024-01-16 NOTE — PROGRESS NOTES
Chief Complaint   Patient presents with    Lab Results     Has eye exam next month      Note to patient: The  [employer] Act makes medical notes like these available to patients in the interest of transparency. However, be advised this is a medical document. It is intended as peer to peer communication. It is written in medical language and may contain abbreviations or verbiage that are unfamiliar. It may appear blunt or direct. Medical documents are intended to carry relevant information, facts as evident, and the clinical opinion of the practitioner.     HPI:    63-year-old male presenting  for DM follow up.   He has a history of type 2 diabetes currently taking Ozempic 2 mg per week, Invokana 100mg daily, metformin 1000 mg bid tolerating medications well.Last A1c value was 7.1% done 2024. He is seeing Endo. Denies fmhx of MEN. No hx of thyroid Ca.   Average FB  Exercise:  Active with work  Diet: Tries to follow low-carb diet  Diabetic eye exam: due for exam - will do it in Feb.   Reports  decreased sensation heel of left foot- he tried gabapentin and it did not help. He has had it done since having DELISA for sciatica.    Denies episodes of hypoglycemia.     He has a history of hyperlipidemia for which he is taking atorvastatin 40mg daily.  Denies any side effects to the medication.     He has a history of hypertension for which she takes losartan 50 mg daily.  Blood pressure is well controlled at home and in the office today.  No chest pain shortness of breath swelling of the legs.     MDD- escitalopram 10mg daily.     Hx of BELLA- on cpap     Neuropathy in feet is worsening. He was started on gabapentin at last visit.       Review of Systems   Constitutional: Negative for fever, chills and fatigue. No distress.  Eyes: Negative for pain and visual disturbance.   Respiratory: Negative for cough, chest tightness, shortness of breath and wheezing.    Cardiovascular: Negative for chest pain, palpitations  and leg swelling.   Gastrointestinal: Negative for nausea, vomiting, abdominal pain  Musculoskeletal: positive joint swelling, arthralgias   Skin: Negative for color change and rash.        Patient Active Problem List   Diagnosis    BELLA on CPAP    Type 2 diabetes mellitus without complication, unspecified whether long term insulin use ([facility])    GERD (gastroesophageal reflux disease)    Mild intermittent asthma without complication    Depression    Empty sella (HCC)    Diverticula of colon    Schatzki's ring    Gastritis    Hypogonadotropic hypogonadism (HCC)    Mixed hyperlipidemia    Spondylosis of lumbar region without myelopathy or radiculopathy    Murmur, cardiac    Carotid stenosis    Diastolic dysfunction    Chronic midline thoracic back pain    Essential hypertension    Acquired hypothyroidism    Seasonal allergies    Primary osteoarthritis of right knee    Neuropathy    Secondary hypothyroidism    Dyslipidemia associated with type 2 diabetes mellitus  ([facility])    Major depressive disorder, recurrent, mild ([facility])    Type 2 diabetes mellitus with diabetic neuropathy ([facility])       Past Medical History:   Diagnosis Date    Acquired hypothyroidism 04/07/2016    Adjustment disorder 03/14/2017    With mixed depression and anxiety- psychology dr trice mcadams 3.2017    Asthma     Carotid stenosis 07/15/2016    Depression 06/20/2011    Diabetes ([facility]) 2014    Diastolic dysfunction 09/27/2016    Diverticula of colon 09/26/2011    of sigmoid colon    Empty sella ([facility]) 07/01/2011    a 1.2 cm mass is identified within th pituitary gland consistent with empty sella, a benign process    GERD (gastroesophageal reflux disease)     Hyperlipidemia 2014    Hypogonadotropic hypogonadism (HCC) 04/07/2016    Internal hemorrhoids     Low back pain 04/20/2010    recurrent    Mixed hyperlipidemia 04/07/2016    Mononucleosis 07/2014    Murmur, cardiac 07/15/2016    BELLA on CPAP     Schatzki's ring 12/05/2002    Sleep apnea 2012    Type 2  diabetes mellitus, uncontrolled 04/07/2016     Past Surgical History:   Procedure Laterality Date    BACK SURGERY  01/01/1997    repair shattered L5 disc  no metal remains    CARPAL TUNNEL RELEASE Right     COLONOSCOPY  01/01/2011    COLONOSCOPY  2011    SINUS SURGERY        deviated septum surg 2010    TRIGGER FINGER RELEASE      UPPER GI ENDOSCOPY PERFORMED  03/25/2013    schatzki's ring     Family History   Problem Relation Age of Onset    Other (colon cancer) Other     Diabetes Mother     Other (COPD) Mother     Asthma Mother     Depression Mother     Prostate Cancer Neg      Social History     Socioeconomic History    Marital status:    Occupational History    Occupation: [employer]     Employer: [employer]   Tobacco Use    Smoking status: Never    Smokeless tobacco: Never   Substance and Sexual Activity    Alcohol use: Not Currently    Drug use: No   Other Topics Concern    Caffeine Concern No    Stress Concern No    Weight Concern No    Special Diet Yes    Exercise No    Seat Belt Yes       Current Outpatient Medications   Medication Sig Dispense Refill    atorvastatin 20 MG Oral Tab Take 1 tablet (20 mg total) by mouth nightly.      gabapentin 300 MG Oral Cap Take 1 capsule (300 mg total) by mouth nightly. TAKE AT BEDTIME 90 capsule 3    LOSARTAN 50 MG Oral Tab TAKE 1 TABLET BY MOUTH  DAILY 90 tablet 3    OZEMPIC, 2 MG/DOSE, 8 MG/3ML Subcutaneous Solution Pen-injector Inject 2 mg into the skin once a week.      METFORMIN HCL 1000 MG Oral Tab TAKE 1 TABLET BY MOUTH  TWICE DAILY WITH MEALS 180 tablet 3    INVOKANA 100 MG Oral Tab TAKE 1 TABLET BY MOUTH DAILY 90 tablet 3    escitalopram 10 MG Oral Tab Take 1 tablet (10 mg total) by mouth daily. 90 tablet 1    omeprazole 20 MG Oral Capsule Delayed Release Take 1 capsule (20 mg total) by mouth daily. 90 capsule 3    valACYclovir 1 G Oral Tab Take 2 tablets at first sign of cold sore then repeat in 12 hours, then stop. 30 tablet 1    albuterol  (VENTOLIN HFA) 108 (90 Base) MCG/ACT Inhalation Aero Soln Inhale 2 puffs into the lungs every 4 (four) hours as needed for Wheezing or Shortness of Breath. 1 each 3    EPINEPHrine (EPIPEN 2-SELVIN) 0.3 MG/0.3ML Injection Solution Auto-injector Take as directed--brand or generic ok 2 each 0    Insulin Pen Needle (PEN NEEDLES) 32G X 4 MM Does not apply Misc 1 each by Does not apply route every 7 days. 90 each 0    Aspirin 81 MG Oral Tab Take 1 tablet (81 mg total) by mouth daily.         Allergies  Allergies   Allergen Reactions    Dust Mites ASTHMA    Pollen OTHER (SEE COMMENTS)    Ragweed OTHER (SEE COMMENTS)    Tree Extract OTHER (SEE COMMENTS)       Health Maintenance  Immunizations:  Immunization History   Administered Date(s) Administered    >=3 YRS FLUZONE OR FLUARIX QUAD PRESERVE FREE SINGLE DOSE (17518) FLU CLINIC 09/27/2016, 10/17/2017    >=3 YRS TRI  MULTIDOSE VIAL (37198) FLU CLINIC 11/17/2014    >=9 YRS AFLURIA TRI PRESERV FREE SINGLE DOSE (33950) FLU CLINIC 11/05/2015    Covid-19 Vaccine Pfizer 30 mcg/0.3 ml 02/16/2021, 03/09/2021, 11/23/2021    FLULAVAL 6 months & older 0.5 ml Prefilled syringe (95076) 09/24/2018, 09/30/2019, 10/24/2022    FLUZONE 6 months and older PFS 0.5 ml (97421) 10/19/2020, 10/12/2021, 10/16/2023    Influenza 01/01/2008, 01/01/2009, 09/22/2010, 09/19/2011, 01/08/2013, 08/16/2023    Pneumococcal Conjugate PCV20 08/28/2023    Pneumovax 23 12/03/2018    TDAP 06/10/2003, 06/09/2020    Zoster Vaccine Recombinant Adjuvanted (Shingrix) 08/05/2019, 02/03/2020         Physical Exam  /70   Pulse 97   Temp 97 °F (36.1 °C) (Temporal)   Resp 18   Ht 5' 11\" (1.803 m)   Wt 180 lb (81.6 kg)   SpO2 97%   BMI 25.10 kg/m²   Constitutional: Oriented to person, place, and time. No distress.   HEENT:  Normocephalic and atraumatic.    Cardiovascular: Normal rate, regular rhythm and intact distal pulses.  No murmur, rubs or gallops.   Pulmonary/Chest: Effort normal and breath sounds normal. No  respiratory distress. No wheezes, rhonchi or rales  Abdominal: Soft. Bowel sounds are normal. Non tender, no masses, no organomegaly or hernias.  Neurological: grossly normal   Bilateral barefoot skin diabetic exam is normal, visualized feet and the appearance is normal.  Bilateral monofilament/sensation of both feet is DECREASED AT LEFT FOOT #9 OTHERWISE NORMAL.  Pulsation pedal pulse exam of both lower legs/feet is normal as well.         A/P:    Encounter Diagnoses   Name Primary?    Mixed hyperlipidemia     BELLA on CPAP     Mild intermittent asthma without complication     Essential hypertension     Type 2 diabetes mellitus without complication, unspecified whether long term insulin use (HCC) Yes             BELLA on CPAP   stable      Essential hypertension  BP shows good control with last BP of 104/70. Continue lifestyle changes, diet, exercise and weight loss.   Last K was 3.7 done on 1/8/2024.  Last Cr was 1.02 done on 1/8/2024.  Last eGFR was 83 on 1/8/2024.  BP Meds: losartan Tabs - 50 MG         Mild intermittent asthma without complication  Albuterol prn     Mixed hyperlipidemia   WILL REDUCE ATORVASTATIN TO 20MG PER DAY.   Lipid panel is normal      Depression, unspecified depression type  Continue on lexapro      Type 2 diabetes mellitus without complication, without long-term current use of insulin (HCC)  Last A1c value was 7.1% done 1/8/2024.  1.  Rx changes:  metformin 1000mg bid, ozempic 2mg weekly, invokana 100mg daily.   2.  Education: Reviewed ‘ABCs’ of diabetes management (respective goals in parentheses):  A1C (<7), blood pressure (<130/80), and cholesterol (LDL <100).  3.  Compliance at present is estimated to be good. Efforts to improve compliance (if necessary) will be directed at dietary modifications: low carb .  4. Follow up: 6 months                 Orders Placed This Encounter   Procedures    CMP in 6 months    Lipid in 6 months    Hemoglobin A1C in 6 months    Microalb/Creat Ratio,  Random Urine in 6 months       Meds & Refills for this Visit:  Requested Prescriptions      No prescriptions requested or ordered in this encounter       Imaging & Consults:  None      No follow-ups on file.    There are no Patient Instructions on file for this visit.             Alen Henson DO        This note was prepared using Dragon Medical voice recognition dictation software. As a result errors may occur. When identified these errors have been corrected. While every attempt is made to correct errors during dictation discrepancies may still exist.

## 2024-01-26 DIAGNOSIS — F32.A DEPRESSION, UNSPECIFIED DEPRESSION TYPE: ICD-10-CM

## 2024-01-30 RX ORDER — ESCITALOPRAM OXALATE 10 MG/1
10 TABLET ORAL DAILY
Qty: 90 TABLET | Refills: 3 | Status: SHIPPED | OUTPATIENT
Start: 2024-01-30

## 2024-01-30 NOTE — TELEPHONE ENCOUNTER
Requesting Escitalopram 10mg  Last OV: 1/16/24  RTC: 6 months  Last Rx'd 8/21/23 #90 with 1 refills    Future Appointments   Date Time Provider Department Center   7/16/2024  9:00 AM Alen Henson,  [facility] [address] [facility] [address]       Non-protocol med:  Rx pended and routed for approval/denial

## 2024-02-09 ENCOUNTER — TELEPHONE (OUTPATIENT)
Dept: FAMILY MEDICINE CLINIC | Facility: CLINIC | Age: 64
End: 2024-02-09

## 2024-02-09 DIAGNOSIS — K29.50 CHRONIC GASTRITIS, PRESENCE OF BLEEDING UNSPECIFIED, UNSPECIFIED GASTRITIS TYPE: ICD-10-CM

## 2024-02-09 RX ORDER — OMEPRAZOLE 20 MG/1
20 CAPSULE, DELAYED RELEASE ORAL DAILY
Qty: 90 CAPSULE | Refills: 0 | Status: SHIPPED | OUTPATIENT
Start: 2024-02-09

## 2024-03-19 DIAGNOSIS — K29.50 CHRONIC GASTRITIS, PRESENCE OF BLEEDING UNSPECIFIED, UNSPECIFIED GASTRITIS TYPE: ICD-10-CM

## 2024-03-20 RX ORDER — OMEPRAZOLE 20 MG/1
20 CAPSULE, DELAYED RELEASE ORAL DAILY
Qty: 90 CAPSULE | Refills: 3 | Status: SHIPPED | OUTPATIENT
Start: 2024-03-20

## 2024-03-20 NOTE — TELEPHONE ENCOUNTER
Name from pharmacy: Omeprazole 20 MG Oral Capsule Delayed Release         Will file in chart as: OMEPRAZOLE 20 MG Oral Capsule Delayed Release    Sig: TAKE 1 CAPSULE BY MOUTH DAILY    Disp: 90 capsule    Refills: 3    Start: 3/19/2024    Class: Normal    Non-formulary For: Chronic gastritis, presence of bleeding unspecified, unspecified gastritis type    To pharmacy: Please send a replace/new response with 90-Day Supply if appropriate to maximize member benefit. Requesting 1 year supply.    Last ordered: 1 month ago (2/9/2024) by Alen Henson DO    Last refill: 2/9/2024    Rx #: [id number]    Gastrointestional Medication Protocol [id number]/2024 02:36 PM   Protocol Details In person appointment or virtual visit in the past 12 mos or appointment in next 3 mos      To be filled at: [address] [phone], [phone]

## 2024-05-17 ENCOUNTER — TELEPHONE (OUTPATIENT)
Dept: FAMILY MEDICINE CLINIC | Facility: CLINIC | Age: 64
End: 2024-05-17

## 2024-05-17 NOTE — TELEPHONE ENCOUNTER
Patient wants to know if MD can treat all of his diabetes medications.     Please call patient and advise.

## 2024-05-20 NOTE — TELEPHONE ENCOUNTER
I would recommend staying with Endo since they have been following him so closely and he's receiving great care for his Diabetes there.

## 2024-06-19 NOTE — TELEPHONE ENCOUNTER
Patient will need a new prescription for the Atorvastatin. His dose was decreased to 20 mg. He takes this dose once per day. Please send to mail order pharmacy.

## 2024-06-19 NOTE — TELEPHONE ENCOUNTER
Last OV 1/16/24  Mixed hyperlipidemia   WILL REDUCE ATORVASTATIN TO 20MG PER DAY.   Lipid panel is normal

## 2024-06-21 RX ORDER — ATORVASTATIN CALCIUM 20 MG/1
20 TABLET, FILM COATED ORAL NIGHTLY
Qty: 90 TABLET | Refills: 1 | Status: SHIPPED | OUTPATIENT
Start: 2024-06-21

## 2024-07-10 ENCOUNTER — LAB ENCOUNTER (OUTPATIENT)
Dept: LAB | Age: 64
End: 2024-07-10
Attending: FAMILY MEDICINE
Payer: COMMERCIAL

## 2024-07-10 DIAGNOSIS — E11.9 TYPE 2 DIABETES MELLITUS WITHOUT COMPLICATION, UNSPECIFIED WHETHER LONG TERM INSULIN USE (HCC): ICD-10-CM

## 2024-07-10 DIAGNOSIS — E11.9 TYPE 2 DIABETES MELLITUS WITHOUT COMPLICATION, WITHOUT LONG-TERM CURRENT USE OF INSULIN (HCC): ICD-10-CM

## 2024-07-10 LAB
ALBUMIN SERPL-MCNC: 4.2 G/DL (ref 3.4–5)
ALBUMIN/GLOB SERPL: 1.4 {RATIO} (ref 1–2)
ALP LIVER SERPL-CCNC: 93 U/L
ALT SERPL-CCNC: 23 U/L
ANION GAP SERPL CALC-SCNC: 7 MMOL/L (ref 0–18)
AST SERPL-CCNC: 12 U/L (ref 15–37)
BILIRUB SERPL-MCNC: 0.8 MG/DL (ref 0.1–2)
BUN BLD-MCNC: 10 MG/DL (ref 9–23)
CALCIUM BLD-MCNC: 9.2 MG/DL (ref 8.5–10.1)
CHLORIDE SERPL-SCNC: 106 MMOL/L (ref 98–112)
CHOLEST SERPL-MCNC: 123 MG/DL (ref ?–200)
CO2 SERPL-SCNC: 26 MMOL/L (ref 21–32)
CREAT BLD-MCNC: 0.9 MG/DL
CREAT UR-SCNC: 59.3 MG/DL
EGFRCR SERPLBLD CKD-EPI 2021: 96 ML/MIN/1.73M2 (ref 60–?)
EST. AVERAGE GLUCOSE BLD GHB EST-MCNC: 166 MG/DL (ref 68–126)
FASTING PATIENT LIPID ANSWER: YES
FASTING STATUS PATIENT QL REPORTED: YES
GLOBULIN PLAS-MCNC: 3 G/DL (ref 2.8–4.4)
GLUCOSE BLD-MCNC: 123 MG/DL (ref 70–99)
HBA1C MFR BLD: 7.4 % (ref ?–5.7)
HDLC SERPL-MCNC: 47 MG/DL (ref 40–59)
LDLC SERPL CALC-MCNC: 61 MG/DL (ref ?–100)
MICROALBUMIN UR-MCNC: <0.5 MG/DL
NONHDLC SERPL-MCNC: 76 MG/DL (ref ?–130)
OSMOLALITY SERPL CALC.SUM OF ELEC: 288 MOSM/KG (ref 275–295)
POTASSIUM SERPL-SCNC: 3.9 MMOL/L (ref 3.5–5.1)
PROT SERPL-MCNC: 7.2 G/DL (ref 6.4–8.2)
SODIUM SERPL-SCNC: 139 MMOL/L (ref 136–145)
TRIGL SERPL-MCNC: 72 MG/DL (ref 30–149)
VLDLC SERPL CALC-MCNC: 11 MG/DL (ref 0–30)

## 2024-07-10 PROCEDURE — 80053 COMPREHEN METABOLIC PANEL: CPT

## 2024-07-10 PROCEDURE — 82043 UR ALBUMIN QUANTITATIVE: CPT

## 2024-07-10 PROCEDURE — 36415 COLL VENOUS BLD VENIPUNCTURE: CPT

## 2024-07-10 PROCEDURE — 83036 HEMOGLOBIN GLYCOSYLATED A1C: CPT

## 2024-07-10 PROCEDURE — 80061 LIPID PANEL: CPT

## 2024-07-10 PROCEDURE — 82570 ASSAY OF URINE CREATININE: CPT

## 2024-07-16 ENCOUNTER — OFFICE VISIT (OUTPATIENT)
Dept: FAMILY MEDICINE CLINIC | Facility: CLINIC | Age: 64
End: 2024-07-16
Payer: COMMERCIAL

## 2024-07-16 ENCOUNTER — LAB ENCOUNTER (OUTPATIENT)
Dept: LAB | Age: 64
End: 2024-07-16
Attending: FAMILY MEDICINE
Payer: COMMERCIAL

## 2024-07-16 VITALS
DIASTOLIC BLOOD PRESSURE: 68 MMHG | WEIGHT: 180 LBS | SYSTOLIC BLOOD PRESSURE: 104 MMHG | TEMPERATURE: 98 F | OXYGEN SATURATION: 99 % | HEIGHT: 71 IN | BODY MASS INDEX: 25.2 KG/M2 | HEART RATE: 93 BPM | RESPIRATION RATE: 16 BRPM

## 2024-07-16 DIAGNOSIS — I10 ESSENTIAL HYPERTENSION: ICD-10-CM

## 2024-07-16 DIAGNOSIS — R07.9 CHEST PAIN, UNSPECIFIED TYPE: Primary | ICD-10-CM

## 2024-07-16 DIAGNOSIS — E11.9 TYPE 2 DIABETES MELLITUS WITHOUT COMPLICATION, UNSPECIFIED WHETHER LONG TERM INSULIN USE (HCC): ICD-10-CM

## 2024-07-16 DIAGNOSIS — E78.2 MIXED HYPERLIPIDEMIA: ICD-10-CM

## 2024-07-16 DIAGNOSIS — R07.9 CHEST PAIN, UNSPECIFIED TYPE: ICD-10-CM

## 2024-07-16 LAB
BASOPHILS # BLD AUTO: 0.05 X10(3) UL (ref 0–0.2)
BASOPHILS NFR BLD AUTO: 0.9 %
D DIMER PPP FEU-MCNC: <0.27 UG/ML FEU (ref ?–0.63)
EOSINOPHIL # BLD AUTO: 0.12 X10(3) UL (ref 0–0.7)
EOSINOPHIL NFR BLD AUTO: 2.3 %
ERYTHROCYTE [DISTWIDTH] IN BLOOD BY AUTOMATED COUNT: 15.4 %
HCT VFR BLD AUTO: 43.6 %
HGB BLD-MCNC: 13.7 G/DL
IMM GRANULOCYTES # BLD AUTO: 0.02 X10(3) UL (ref 0–1)
IMM GRANULOCYTES NFR BLD: 0.4 %
LYMPHOCYTES # BLD AUTO: 1.31 X10(3) UL (ref 1–4)
LYMPHOCYTES NFR BLD AUTO: 24.6 %
MCH RBC QN AUTO: 25.9 PG (ref 26–34)
MCHC RBC AUTO-ENTMCNC: 31.4 G/DL (ref 31–37)
MCV RBC AUTO: 82.6 FL
MONOCYTES # BLD AUTO: 0.4 X10(3) UL (ref 0.1–1)
MONOCYTES NFR BLD AUTO: 7.5 %
NEUTROPHILS # BLD AUTO: 3.42 X10 (3) UL (ref 1.5–7.7)
NEUTROPHILS # BLD AUTO: 3.42 X10(3) UL (ref 1.5–7.7)
NEUTROPHILS NFR BLD AUTO: 64.3 %
PLATELET # BLD AUTO: 220 10(3)UL (ref 150–450)
RBC # BLD AUTO: 5.28 X10(6)UL
TROPONIN I SERPL HS-MCNC: <3 NG/L
WBC # BLD AUTO: 5.3 X10(3) UL (ref 4–11)

## 2024-07-16 PROCEDURE — 3074F SYST BP LT 130 MM HG: CPT | Performed by: FAMILY MEDICINE

## 2024-07-16 PROCEDURE — 3008F BODY MASS INDEX DOCD: CPT | Performed by: FAMILY MEDICINE

## 2024-07-16 PROCEDURE — 85379 FIBRIN DEGRADATION QUANT: CPT

## 2024-07-16 PROCEDURE — 36415 COLL VENOUS BLD VENIPUNCTURE: CPT

## 2024-07-16 PROCEDURE — 93000 ELECTROCARDIOGRAM COMPLETE: CPT | Performed by: FAMILY MEDICINE

## 2024-07-16 PROCEDURE — 99215 OFFICE O/P EST HI 40 MIN: CPT | Performed by: FAMILY MEDICINE

## 2024-07-16 PROCEDURE — 3051F HG A1C>EQUAL 7.0%<8.0%: CPT | Performed by: FAMILY MEDICINE

## 2024-07-16 PROCEDURE — 3078F DIAST BP <80 MM HG: CPT | Performed by: FAMILY MEDICINE

## 2024-07-16 PROCEDURE — 84484 ASSAY OF TROPONIN QUANT: CPT

## 2024-07-16 PROCEDURE — 85025 COMPLETE CBC W/AUTO DIFF WBC: CPT

## 2024-07-16 PROCEDURE — 3061F NEG MICROALBUMINURIA REV: CPT | Performed by: FAMILY MEDICINE

## 2024-07-16 RX ORDER — ASPIRIN 81 MG/1
81 TABLET ORAL DAILY
COMMUNITY

## 2024-07-16 NOTE — PROGRESS NOTES
Subjective:   Zeeshan Yang is a 64 year old male who presents for Follow - Up and Chest Pain (Patient c/o left chest pain traveling down left arm pain x 2 wks. Has appointment with cardio/EKG performs and left on pcp's desk )       History/Other:    Chief Complaint Reviewed and Verified  Nursing Notes Reviewed and   Verified  Tobacco Reviewed  Allergies Reviewed  Medications Reviewed    Problem List Reviewed  Medical History Reviewed  Surgical History   Reviewed  Family History Reviewed  Social History Reviewed         Patient has left sided chest pain coming and going for two weeks. Patient will be seeing cardiology, Dr. Justice at [facility]. Appointment is in two weeks. Wife is a nurse and made the appointment.  He does not have active chest pain right now. He was going up the incline walkway two weeks ago and noted left sided chest pain- it was a mild tightness, no diaphoresis, no shortness of breath. It lasted ten minutes. It stayed for 2-3 days. Then it went away and came back. He does not have previous cardiac history.     Chest Pain   This is a new problem. Episode onset: 2 weeks. The problem occurs every several days. The pain is present in the lateral region. The pain is at a severity of 2/10. The pain is moderate. The quality of the pain is described as tightness. The pain radiates to the left arm. Associated symptoms include exertional chest pressure. Pertinent negatives include no back pain, claudication, cough, dizziness, headaches, irregular heartbeat, leg pain, lower extremity edema, malaise/fatigue, near-syncope, orthopnea, palpitations or shortness of breath. Risk factors include male gender.   His past medical history is significant for hyperlipidemia and hypertension.   Pertinent negatives for past medical history include no CHF, no MI and no thyroid problem.   His family medical history is significant for CAD, heart disease, hyperlipidemia and hypertension.     Last A1c value was  7.4% done 7/10/2024.    He has a history of type 2 diabetes currently taking Ozempic 2 mg per week, Invokana 100mg daily, metformin 1000 mg bid tolerating medications well.  Last A1c value was 7.4% done 7/10/2024. H will start seeing Dr. Dorantes.   Average FB's   Exercise:  Active with work  Diet:   low-carb diet  Diabetic eye exam: UTD  Denies episodes of hypoglycemia.     He has a history of hyperlipidemia for which he is taking atorvastatin 40mg daily.  Denies any side effects to the medication.     He has a history of hypertension for which she takes losartan 50 mg daily.  Blood pressure is well controlled at home and in the office today.  No chest pain shortness of breath swelling of the legs.     MDD- escitalopram 10mg daily.     Hx of BELLA- on cpap       Lab Results   Component Value Date    A1C 7.4 (H) 07/10/2024    A1C 7.1 (H) 2024    A1C 7.3 (H) 2023    A1C 7.2 (H) 2023    A1C 7.8 (H) 11/15/2022          Tobacco:  He has never smoked tobacco.    Current Outpatient Medications   Medication Sig Dispense Refill    aspirin 81 MG Oral Tab EC Take 1 tablet (81 mg total) by mouth daily.      atorvastatin 20 MG Oral Tab Take 1 tablet (20 mg total) by mouth nightly. 90 tablet 1    OMEPRAZOLE 20 MG Oral Capsule Delayed Release TAKE 1 CAPSULE BY MOUTH DAILY 90 capsule 3    escitalopram 10 MG Oral Tab Take 1 tablet (10 mg total) by mouth daily. 90 tablet 3    LOSARTAN 50 MG Oral Tab TAKE 1 TABLET BY MOUTH  DAILY 90 tablet 3    OZEMPIC, 2 MG/DOSE, 8 MG/3ML Subcutaneous Solution Pen-injector Inject 2 mg into the skin once a week.      METFORMIN HCL 1000 MG Oral Tab TAKE 1 TABLET BY MOUTH  TWICE DAILY WITH MEALS 180 tablet 3    INVOKANA 100 MG Oral Tab TAKE 1 TABLET BY MOUTH DAILY 90 tablet 3    valACYclovir 1 G Oral Tab Take 2 tablets at first sign of cold sore then repeat in 12 hours, then stop. 30 tablet 1    albuterol (VENTOLIN HFA) 108 (90 Base) MCG/ACT Inhalation Aero Soln Inhale 2 puffs into  the lungs every 4 (four) hours as needed for Wheezing or Shortness of Breath. 1 each 3    EPINEPHrine (EPIPEN 2-SELVIN) 0.3 MG/0.3ML Injection Solution Auto-injector Take as directed--brand or generic ok 2 each 0    Insulin Pen Needle (PEN NEEDLES) 32G X 4 MM Does not apply Misc 1 each by Does not apply route every 7 days. 90 each 0    Aspirin 81 MG Oral Tab Take 1 tablet (81 mg total) by mouth daily.       HISTORY:  Past Medical History:    Acquired hypothyroidism    Adjustment disorder    With mixed depression and anxiety- psychology dr trice mcadams 3.2017    Asthma (HCC)    Carotid stenosis    Depression    Diabetes (HCC)    Diastolic dysfunction    Diverticula of colon    of sigmoid colon    Empty sella (HCC)    a 1.2 cm mass is identified within th pituitary gland consistent with empty sella, a benign process    GERD (gastroesophageal reflux disease)    Hyperlipidemia    Hypogonadotropic hypogonadism (HCC)    Internal hemorrhoids    Low back pain    recurrent    Mixed hyperlipidemia    Mononucleosis    Murmur, cardiac    BELLA on CPAP    Schatzki's ring    Sleep apnea    Type 2 diabetes mellitus, uncontrolled      Past Surgical History:   Procedure Laterality Date    Back surgery  01/01/1997    repair shattered L5 disc  no metal remains    Carpal tunnel release Right     Colonoscopy  01/01/2011    Colonoscopy  2011    Sinus surgery        deviated septum surg 2010    Trigger finger release      Upper gi endoscopy performed  03/25/2013    schatzki's ring      Family History   Problem Relation Age of Onset    Other (colon cancer) Other     Diabetes Mother     Other (COPD) Mother     Asthma Mother     Depression Mother     Prostate Cancer Neg       Social History     Socioeconomic History    Marital status:    Occupational History    Occupation: [employer]     Employer: [employer]   Tobacco Use    Smoking status: Never     Passive exposure: Never    Smokeless tobacco: Never   Vaping Use    Vaping status:  Never Used   Substance and Sexual Activity    Alcohol use: Not Currently    Drug use: No   Other Topics Concern    Caffeine Concern No    Stress Concern No    Weight Concern No    Special Diet Yes    Exercise No    Seat Belt Yes            Review of Systems:  Review of Systems   Constitutional:  Negative for malaise/fatigue.   Respiratory:  Negative for cough and shortness of breath.    Cardiovascular:  Positive for chest pain. Negative for palpitations, orthopnea, claudication and near-syncope.   Musculoskeletal:  Negative for back pain.   Neurological:  Negative for dizziness and headaches.       Objective:   /68   Pulse 93   Temp 98 °F (36.7 °C) (Temporal)   Resp 16   Ht 5' 11\" (1.803 m)   Wt 180 lb (81.6 kg)   SpO2 99%   BMI 25.10 kg/m²  Estimated body mass index is 25.1 kg/m² as calculated from the following:    Height as of this encounter: 5' 11\" (1.803 m).    Weight as of this encounter: 180 lb (81.6 kg).  Physical Exam  Constitutional:       Appearance: He is well-developed.   HENT:      Head: Normocephalic and atraumatic.      Mouth/Throat:      Pharynx: No oropharyngeal exudate.   Eyes:      Pupils: Pupils are equal, round, and reactive to light.   Neck:      Thyroid: No thyromegaly.   Cardiovascular:      Rate and Rhythm: Normal rate and regular rhythm.      Heart sounds: Normal heart sounds. No murmur heard.     No friction rub. No gallop.   Pulmonary:      Effort: Pulmonary effort is normal. No respiratory distress.      Breath sounds: Normal breath sounds. No wheezing or rales.   Chest:      Chest wall: No tenderness.   Abdominal:      General: Bowel sounds are normal. There is no distension.      Palpations: Abdomen is soft.      Tenderness: There is no abdominal tenderness. There is no guarding or rebound.   Musculoskeletal:         General: Normal range of motion.      Cervical back: Neck supple. No rigidity.   Skin:     General: Skin is warm and dry.   Neurological:      Mental Status:  He is alert and oriented to person, place, and time.   Psychiatric:         Behavior: Behavior normal.         Thought Content: Thought content normal.         Judgment: Judgment normal.       EKG    Rate, intervals and axes as noted on EKG Report.  Rate: 77   Rhythm: Sinus Rhythm  Reading: no acute st or t wave changes.       Assessment & Plan:   1. Chest pain, unspecified type (Primary)  No active chest pain at present.   EKG in the office is unremarkable.  Stat troponin and D-dimer has been ordered.  Stress test ordered.  Patient has appointment with cardiologist for first-time visit in 2 weeks.  He does have a few risk factors including diabetes, hypertension, family history of coronary artery disease.  Patient instructed that if he has recurrence of chest pain to promptly go to the ER as it could be a sign of impending MI.  Also advised on taking chewable aspirin x 2 81mg tabs at onset of chest pain.  He is currently taking aspirin 81 mg daily.  Continue on statin.  Patient to call if there is increase in episodes of chest pain.  -     D-Dimer; Future; Expected date: 07/16/2024  -     EKG with interpretation and Report -IN OFFICE [[id number]]  -     Troponin I (High Sensitivity); Future; Expected date: 07/16/2024  -     CARD TREADMILL STRESS, ADULT (CPT=93017); Future; Expected date: 07/16/2024  2. Type 2 diabetes mellitus without complication, unspecified whether long term insulin use (HCC)  Last A1c value was 7.4% done 7/10/2024.  A1c is 7.4 done 7/10/2024 which shows hyperglycemia and borderline control, maintain vigilance and increase activity and medications as listed.  Diabetic medications include INVOKANA 100 MG Oral Tab [[id number]], METFORMIN HCL 1000 MG Oral Tab [[id number]], OZEMPIC, 2 MG/DOSE, 8 MG/3ML Subcutaneous Solution Pen-injector [[id number]], OZEMPIC, 2 MG/DOSE, 8 MG/3ML Subcutaneous Solution Pen-injector [[id number]] (Long-Term Med).  He will be establishing with Dr. Dorantes   -     Comp Metabolic  Panel (14); Future; Expected date: 01/12/2025  -     Hemoglobin A1C; Future; Expected date: 01/12/2025  -     Microalb/Creat Ratio, Random Urine; Future; Expected date: 01/12/2025  -     CBC With Differential With Platelet; Future; Expected date: 07/16/2024  -     Troponin I (High Sensitivity); Future; Expected date: 07/16/2024    3. Mixed hyperlipidemia  pt on statin, will notify me of any side effects, aware of potential muscle aches/cramps, liver toxicity. Will recheck cmp and lipids in 6 months.       4. Essential hypertension  BP shows good control with last BP of 104/68. Continue lifestyle changes, diet, exercise and weight loss.   Last K was 3.9 done on 7/10/2024.  Last Cr was 0.9 done on 7/10/2024.  Last eGFR was 96 on 7/10/2024.  BP Meds: losartan Tabs - 50 MG       40 minutes were spent face to face with the patient in which time was spent  preparing to see patient (including chart review and preparation), obtaining and or reviewing additional medical history, performing a physical exam and evaluation, documenting clinical information in the electronic health record, independently interpreting results, communicating results to family or caregiver, and/or coordinating care.        No follow-ups on file.    Alen Henson DO, 7/16/2024, 9:07 AM

## 2024-07-19 ENCOUNTER — TELEPHONE (OUTPATIENT)
Dept: FAMILY MEDICINE CLINIC | Facility: CLINIC | Age: 64
End: 2024-07-19

## 2024-07-19 NOTE — TELEPHONE ENCOUNTER
Patient states that he has a sore throat and lost taste. He did talk to PCP about this at the appointment last week. Patient took a Covid test prior to his appointment. Patient would like medication sent to pharmacy. Informed patient that he would need an appointment-please advise.

## 2024-07-19 NOTE — TELEPHONE ENCOUNTER
Spoke to patient, states he started yesterday with a sore throat.  Still will loss of taste  Pt states he took another Covid test today and it was negative  No fever  No cough, but states he is taking Tessalon perles  Advised patient he can try throat lozenges, tea with honey, salt water gargles for sore throat    Patient is requesting antibiotic.

## 2024-07-19 NOTE — TELEPHONE ENCOUNTER
He mentioned taking home covid test and it was negative but felt fine otherwise. Can you get some more info on his symptoms and if he tested himself again (is it positive?)

## 2024-07-22 RX ORDER — AZITHROMYCIN 250 MG/1
TABLET, FILM COATED ORAL
Qty: 6 TABLET | Refills: 0 | Status: SHIPPED | OUTPATIENT
Start: 2024-07-22 | End: 2024-07-26

## 2024-07-29 ENCOUNTER — HOSPITAL ENCOUNTER (OUTPATIENT)
Dept: CV DIAGNOSTICS | Age: 64
Discharge: HOME | End: 2024-07-29
Attending: FAMILY MEDICINE
Payer: COMMERCIAL

## 2024-07-29 DIAGNOSIS — R07.9 CHEST PAIN, UNSPECIFIED TYPE: ICD-10-CM

## 2024-07-29 PROCEDURE — 93017 CV STRESS TEST TRACING ONLY: CPT | Performed by: FAMILY MEDICINE

## 2024-07-29 PROCEDURE — 93018 CV STRESS TEST I&R ONLY: CPT | Performed by: FAMILY MEDICINE

## 2024-08-06 ENCOUNTER — TELEPHONE (OUTPATIENT)
Facility: CLINIC | Age: 64
End: 2024-08-06

## 2024-08-06 ENCOUNTER — OFFICE VISIT (OUTPATIENT)
Facility: CLINIC | Age: 64
End: 2024-08-06
Payer: COMMERCIAL

## 2024-08-06 ENCOUNTER — LAB ENCOUNTER (OUTPATIENT)
Dept: LAB | Age: 64
End: 2024-08-06
Attending: STUDENT IN AN ORGANIZED HEALTH CARE EDUCATION/TRAINING PROGRAM
Payer: COMMERCIAL

## 2024-08-06 VITALS
DIASTOLIC BLOOD PRESSURE: 58 MMHG | BODY MASS INDEX: 25.2 KG/M2 | OXYGEN SATURATION: 98 % | HEART RATE: 78 BPM | WEIGHT: 180 LBS | SYSTOLIC BLOOD PRESSURE: 106 MMHG | HEIGHT: 71 IN

## 2024-08-06 DIAGNOSIS — E03.8 SECONDARY HYPOTHYROIDISM: Primary | ICD-10-CM

## 2024-08-06 DIAGNOSIS — E03.8 SECONDARY HYPOTHYROIDISM: ICD-10-CM

## 2024-08-06 DIAGNOSIS — E11.9 TYPE 2 DIABETES MELLITUS WITHOUT COMPLICATION, UNSPECIFIED WHETHER LONG TERM INSULIN USE (HCC): Primary | ICD-10-CM

## 2024-08-06 DIAGNOSIS — E11.9 TYPE 2 DIABETES MELLITUS WITHOUT COMPLICATION, UNSPECIFIED WHETHER LONG TERM INSULIN USE (HCC): ICD-10-CM

## 2024-08-06 LAB
T4 FREE SERPL-MCNC: 1 NG/DL (ref 0.8–1.7)
TSI SER-ACNC: 1.26 MIU/ML (ref 0.55–4.78)

## 2024-08-06 PROCEDURE — 3078F DIAST BP <80 MM HG: CPT | Performed by: STUDENT IN AN ORGANIZED HEALTH CARE EDUCATION/TRAINING PROGRAM

## 2024-08-06 PROCEDURE — 84443 ASSAY THYROID STIM HORMONE: CPT

## 2024-08-06 PROCEDURE — 3074F SYST BP LT 130 MM HG: CPT | Performed by: STUDENT IN AN ORGANIZED HEALTH CARE EDUCATION/TRAINING PROGRAM

## 2024-08-06 PROCEDURE — 36415 COLL VENOUS BLD VENIPUNCTURE: CPT

## 2024-08-06 PROCEDURE — 99204 OFFICE O/P NEW MOD 45 MIN: CPT | Performed by: STUDENT IN AN ORGANIZED HEALTH CARE EDUCATION/TRAINING PROGRAM

## 2024-08-06 PROCEDURE — 3008F BODY MASS INDEX DOCD: CPT | Performed by: STUDENT IN AN ORGANIZED HEALTH CARE EDUCATION/TRAINING PROGRAM

## 2024-08-06 PROCEDURE — 84439 ASSAY OF FREE THYROXINE: CPT

## 2024-08-06 RX ORDER — BLOOD-GLUCOSE SENSOR
1 EACH MISCELLANEOUS
Qty: 6 EACH | Refills: 1 | Status: SHIPPED | OUTPATIENT
Start: 2024-08-06

## 2024-08-06 RX ORDER — LANCETS 33 GAUGE
EACH MISCELLANEOUS
Qty: 200 EACH | Refills: 0 | Status: SHIPPED | OUTPATIENT
Start: 2024-08-06

## 2024-08-06 RX ORDER — BLOOD-GLUCOSE METER
1 EACH MISCELLANEOUS DAILY
Qty: 1 KIT | Refills: 0 | Status: SHIPPED | OUTPATIENT
Start: 2024-08-06

## 2024-08-06 RX ORDER — BLOOD SUGAR DIAGNOSTIC
STRIP MISCELLANEOUS
Qty: 200 STRIP | Refills: 0 | Status: SHIPPED | OUTPATIENT
Start: 2024-08-06

## 2024-08-06 NOTE — PROGRESS NOTES
[facility] Note    Name: Zeeshan Yang    Date: 2024    Zeeshan Yang is a 64 year old male who presents for evaluation of T2DM management.     Chief complaint: Diabetes (Type 2 DM here today for Diabetes Management , Fingerstick Pt would like to discuss cgm options /Last A1C 7.4 7/10/24/Eye Exam 24/Foot Exam 24)       Subjective:   Initial HPI consult in 2024  Hx of hypogonadotropic hypogonadism, HLD, HTN, hypothyroidism, BELLA on CPAP.  Patient was previously following with Dr. Christel Nielson, last visit 3/2024.    DM hx:  -Diagnosed with diabetes in   -Family history- yes, strong diabetes family hx    -Re: potential DM medication contraindication (if positive, checkbox selected):  [] History of pancreatitis  [] Personal/fam hx of medullary thyroid cancer/MEN2  [] History of recent/frequent UTI/yeast infxn  [] Previous amputation related to diabetes    -Presence of associated DM complications (if positive, checkbox selected):  [] Macrovascular complications (CAD/CVA/PAD)  [x] Neuropathy (hx of carpal tunnel and sciatica)  [] Retinopathy  [] Nephropathy  [x] HTN  [x] Hyperlipidemia  [] Stroke/TIA  [] Gastroparesis    -Lifestyle: does note dietary indiscretions; wife bakes a lot   - Modifying factors: works nightshift   120-135 when sleeping overnight  105-119 when sleeping during the day    DM meds at first office visit: Metformin 1000 mg BID, Invokana 100 mg daily, Ozempic 2 mg weekly     Blood Glucose log reviewed. Checking 3-4 times per day. Av. Morning/fastin-140, pre-meal: 125-170, episodes of hypoglycemia: No     Previously trialed/failed DM meds: Januvia in     Has a hx of hypogonadotropic hypogonadism and central hypothyroidism. An MRI of the sella was performed 2013 1.2 cm abnormality c/w empty sella and an MRI of the sella was performed 2011 1.2 cm abnormality c/w empty sella.  Previously tried Tirosint and testosterone replacement therapy.   Patient had side effects with both and these were ultimately discontinued.  Had discussion regarding indications with previous endocrinologist and chose to remain off of testosterone and thyroid medication.  Denies any symptoms of hypothyroidism or low testosterone at this time.    History/Other:    Allergies, PMH, SocHx and FHx reviewed and updated as appropriate in Epic on    aspirin 81 MG Oral Tab EC Take 1 tablet (81 mg total) by mouth daily.      atorvastatin 20 MG Oral Tab Take 1 tablet (20 mg total) by mouth nightly. 90 tablet 1    OMEPRAZOLE 20 MG Oral Capsule Delayed Release TAKE 1 CAPSULE BY MOUTH DAILY 90 capsule 3    escitalopram 10 MG Oral Tab Take 1 tablet (10 mg total) by mouth daily. 90 tablet 3    LOSARTAN 50 MG Oral Tab TAKE 1 TABLET BY MOUTH  DAILY 90 tablet 3    OZEMPIC, 2 MG/DOSE, 8 MG/3ML Subcutaneous Solution Pen-injector Inject 2 mg into the skin once a week.      METFORMIN HCL 1000 MG Oral Tab TAKE 1 TABLET BY MOUTH  TWICE DAILY WITH MEALS 180 tablet 3    INVOKANA 100 MG Oral Tab TAKE 1 TABLET BY MOUTH DAILY 90 tablet 3    valACYclovir 1 G Oral Tab Take 2 tablets at first sign of cold sore then repeat in 12 hours, then stop. 30 tablet 1    albuterol (VENTOLIN HFA) 108 (90 Base) MCG/ACT Inhalation Aero Soln Inhale 2 puffs into the lungs every 4 (four) hours as needed for Wheezing or Shortness of Breath. 1 each 3    EPINEPHrine (EPIPEN 2-SELVIN) 0.3 MG/0.3ML Injection Solution Auto-injector Take as directed--brand or generic ok 2 each 0    Insulin Pen Needle (PEN NEEDLES) 32G X 4 MM Does not apply Misc 1 each by Does not apply route every 7 days. 90 each 0     Allergies   Allergen Reactions    Dust Mites ASTHMA    Pollen OTHER (SEE COMMENTS)    Ragweed OTHER (SEE COMMENTS)    Tree Extract OTHER (SEE COMMENTS)     Current Outpatient Medications   Medication Sig Dispense Refill    aspirin 81 MG Oral Tab EC Take 1 tablet (81 mg total) by mouth daily.      atorvastatin 20 MG Oral Tab Take 1 tablet  (20 mg total) by mouth nightly. 90 tablet 1    OMEPRAZOLE 20 MG Oral Capsule Delayed Release TAKE 1 CAPSULE BY MOUTH DAILY 90 capsule 3    escitalopram 10 MG Oral Tab Take 1 tablet (10 mg total) by mouth daily. 90 tablet 3    LOSARTAN 50 MG Oral Tab TAKE 1 TABLET BY MOUTH  DAILY 90 tablet 3    OZEMPIC, 2 MG/DOSE, 8 MG/3ML Subcutaneous Solution Pen-injector Inject 2 mg into the skin once a week.      METFORMIN HCL 1000 MG Oral Tab TAKE 1 TABLET BY MOUTH  TWICE DAILY WITH MEALS 180 tablet 3    INVOKANA 100 MG Oral Tab TAKE 1 TABLET BY MOUTH DAILY 90 tablet 3    valACYclovir 1 G Oral Tab Take 2 tablets at first sign of cold sore then repeat in 12 hours, then stop. 30 tablet 1    albuterol (VENTOLIN HFA) 108 (90 Base) MCG/ACT Inhalation Aero Soln Inhale 2 puffs into the lungs every 4 (four) hours as needed for Wheezing or Shortness of Breath. 1 each 3    EPINEPHrine (EPIPEN 2-SELVIN) 0.3 MG/0.3ML Injection Solution Auto-injector Take as directed--brand or generic ok 2 each 0    Insulin Pen Needle (PEN NEEDLES) 32G X 4 MM Does not apply Misc 1 each by Does not apply route every 7 days. 90 each 0     Past Medical History:    Acquired hypothyroidism    Adjustment disorder    With mixed depression and anxiety- psychology dr trice mcadams 3.2017    Asthma (HCC)    Carotid stenosis    Depression    Diabetes (HCC)    Diastolic dysfunction    Diverticula of colon    of sigmoid colon    Empty sella (HCC)    a 1.2 cm mass is identified within th pituitary gland consistent with empty sella, a benign process    GERD (gastroesophageal reflux disease)    Hyperlipidemia    Hypogonadotropic hypogonadism (HCC)    Internal hemorrhoids    Low back pain    recurrent    Mixed hyperlipidemia    Mononucleosis    Murmur, cardiac    BELLA on CPAP    Schatzki's ring    Sleep apnea    Type 2 diabetes mellitus, uncontrolled     Family History   Problem Relation Age of Onset    Other (colon cancer) Other     Diabetes Mother     Other (COPD) Mother      Asthma Mother     Depression Mother     Prostate Cancer Neg      Social history: Reviewed.      ROS/Exam    REVIEW OF SYSTEMS: Ten point review of systems has been performed and is otherwise negative and/or non-contributory, except as described above.     VITALS  Vitals:    08/06/24 0926   BP: 106/58   Pulse: 78   SpO2: 98%   Weight: 180 lb (81.6 kg)   Height: 5' 11\" (1.803 m)       Wt Readings from Last 6 Encounters:   08/06/24 180 lb (81.6 kg)   07/16/24 180 lb (81.6 kg)   01/16/24 180 lb (81.6 kg)   10/16/23 180 lb (81.6 kg)   08/28/23 182 lb 8.7 oz (82.8 kg)   05/22/23 187 lb (84.8 kg)       PHYSICAL EXAM  CONSTITUTIONAL:  awake, alert, cooperative, no apparent distress   PSYCH: normal affect  LUNGS: breathing comfortably  CARDIOVASCULAR:  regular rate   NECK:  no palpable thyroid nodules      Labs/Imaging: Pertinent imaging reviewed.    Overall glucose control:  Lab Results   Component Value Date    A1C 7.4 (H) 07/10/2024    A1C 7.1 (H) 01/08/2024    A1C 7.3 (H) 07/17/2023    A1C 7.2 (H) 02/27/2023    A1C 7.8 (H) 11/15/2022       Supplementary Documentation:   -Surveillance for Diabetes Complications & Risks  Foot exam/neuropathy: Last Foot Exam: 01/16/24    Retinopathy screening: Last Dilated Eye Exam: 02/08/24  Eye Exam shows Diabetic Retinopathy?: No      Assessment & Plan:     ICD-10-CM    1. Secondary hypothyroidism  E03.8 TSH and Free T4 [E]      2. Type 2 diabetes mellitus without complication, unspecified whether long term insulin use (HCC)  E11.9           Zeeshan Yang is a pleasant 64 year old male here for evaluation of:    #Diabetes- PMHx of Type 2 diabetes mellitus diagnosed in 2014.        Last A1c value was 7.4% done 7/10/2024.  Goal <7%. Importance of better glucose control in preventing onset/progression of end-organ damage discussed, as well as goals of therapy and clinical significance of A1C.     - med changes: Continue Metformin 1000 mg BID, Invokana 100 mg daily, Ozempic 2 mg weekly     -Discussed diabetic diet, pt will inform me if he would like to meet with Bear regarding diet education   - start CGM--patient to call insurance and inquire which one is covered, then to notify the clinic with phone (connected to clinic profile)  - patient is on insulin, and has suboptimal glycemic control including wide glycemic swings, thus would benefit from CGM and - the patient is not on insulin and does not have history of recurrent hypoglycemia, CGM ordered, likely will have to pay out of pocket for CGM  - Reviewed action, risk vs benefit, dosing, and potential side effects of GLP1. Patient denies hx of pancreatitis, gastroparesis, or personal or family hx of medullary thyroid CA or MEN 2.     -Will work on diet and exercise   -See above header \"Supplementary Documentation\" for surveillance for diabetes complications & risks    #Nephropathy screening/CKD:   Lab Results   Component Value Date    EGFRCR 96 07/10/2024    MICROALBCREA  07/10/2024      Comment:      Unable to calculate due to Urine Microalbumin <0.5 mg/dL        #Blood pressure control: SBP is to goal <130   BP Readings from Last 1 Encounters:   08/06/24 106/58   Continue losartan Tabs - 50 MG     #Hyperlipidemia/Lipids: LDL is to goal   Lab Results   Component Value Date    LDL 61 07/10/2024    TRIG 72 07/10/2024   Continue atorvastatin Tabs - 20 MG      #Empty Sella  #Hypogonadotropic Hypogonadism  -Pathophysiology of condition, usual clinical course, rx options with associated indications/risks/benefits have previously been d/w pt in detail.  -He has sequalae of secondary hypothyroidism and secondary hypogonadism (for which he has deferred treatment for in the past)  -Last MRI stable in 2013.   -2016 Total Te low   -Had TRT in the past and did not like it, prefers to remain off of TRT   -Consider DEXA at age 66      #Central Hypothyroidism  -Pathophysiology of condition and goals of therapy discussed    -Indications/risks/benefits of  treatment vs no treatment d/w pt previously and at this visit   -Patient's TFTs (specifically his FT4 levels) have been monitored for several years.   -5/2023 TSH 1.82, FT4  0.7 --> 1/2024 TSH 1.77 (no FT4)   -He has prefered to stay off meds. Was previously on LT4 but had SE of anxiety  -Will repeat labs and continue to monitor early on Sunday stimuli.      Return in about 3 months (around 11/6/2024).    8/6/2024  Francine Dorantes, DO    Note to patient: The 21 Century Cures Act makes medical notes like these available to patients in the interest of transparency. However, be advised this is a medical document. It is intended as peer to peer communication. It is written in medical language and may contain abbreviations or verbiage that are unfamiliar. It may appear blunt or direct. Medical documents are intended to carry relevant information, facts as evident, and the clinical opinion of the practitioner.

## 2024-08-06 NOTE — TELEPHONE ENCOUNTER
Patient called insurance and was told to let the Provider know that he can go either with Dexcom or Freestyle Jonathan CGM  . He said he would prefer the Freesyle Jonathan and if we could start the process for a CGM. He also stated he is using [facility] or [facility] for pharmacy which are on file.

## 2024-08-06 NOTE — PATIENT INSTRUCTIONS
Return Visit   [ x ] Physician in 6 months, Ashly/Candice in 3 months  [  ] In person or video visit  [  ] In person only    [ x ] After visit summary   [ x ] Placed labs/imaging      It was great seeing you today!    Today we discussed your diabetes and history of empty sella:  -Continue  Metformin 1000 mg BID, Invokana 100 mg daily, Ozempic 2 mg weekly   -Discuss with insurance regarding a continuous glucose monitor and keep us updated  -Let me know if you would like to meet with Bear regarding diet education   -Please complete your thyroid labs and I will be in touch     Take care!  -Dr. Dorantes

## 2024-08-06 NOTE — TELEPHONE ENCOUNTER
LOV: 8/6/24     Next office visit: 11/11/24 w/ Ivy     Refill request: Test strips for glucose Meter Pt is using Accu-Check Guide test strips  - [facility]      Order pended and routed to provider     Patient also wanted to inform you that his insurance said he can go with Freestyle Jonathan or Dexcom he would prefer to go with the Freestyle Jonathan if you can put that in as well.

## 2024-08-07 DIAGNOSIS — E11.9 TYPE 2 DIABETES MELLITUS WITHOUT COMPLICATION, WITHOUT LONG-TERM CURRENT USE OF INSULIN (HCC): ICD-10-CM

## 2024-08-08 RX ORDER — CANAGLIFLOZIN 100 MG/1
TABLET, FILM COATED ORAL DAILY
Qty: 90 TABLET | Refills: 1 | Status: SHIPPED | OUTPATIENT
Start: 2024-08-08

## 2024-08-08 NOTE — TELEPHONE ENCOUNTER
Requesting Invokana 100mg  Filled: 10/2/23 #90 with 3 refills    Requesting Metformin 1000mg BID  Filled: 10/2/23 #180 with 3 refills    LOV: 7/16/24  RTC: 6 months  Last Relevant Labs: 7/10/24    Future Appointments   Date Time Provider Department Center   11/11/2024  8:45 AM Candice Toney APRN [facility] [place]   1/13/2025  9:30 AM Alen Henson DO [facility] [address]   2/10/2025  8:30 AM Francine Dorantes DO [id number] [facility] [place]     Diabetes Medication Protocol [id number]/07/2024 10:11 PM   Protocol Details Last A1C < 7.5 and within past 6 months    In person appointment or virtual visit in the past 6 mos or appointment in next 3 mos    Microalbumin procedure in past 12 months or taking ACE/ARB    EGFRCR or GFRNAA > 50    GFR in the past 12 months     RXs sent to pharmacy per protocol

## 2024-08-12 ENCOUNTER — NURSE ONLY (OUTPATIENT)
Facility: CLINIC | Age: 64
End: 2024-08-12
Payer: COMMERCIAL

## 2024-08-12 DIAGNOSIS — E11.9 TYPE 2 DIABETES MELLITUS WITHOUT COMPLICATION, UNSPECIFIED WHETHER LONG TERM INSULIN USE (HCC): Primary | ICD-10-CM

## 2024-08-12 NOTE — TELEPHONE ENCOUNTER
Key: [id number]    PA submitted via [facility] for Jonathan 3.    Will await determination.

## 2024-08-12 NOTE — PROGRESS NOTES
Jonathan 3 Start    Zeeshan Yang  3/22/1960    Referred by: Francine Dorantes DO      Patient seen in clinic to start on new Jonathan device.  Patient given verbal instructions and demonstrated and completed teach back in the correct order before placing first sensor on back of  the arm.    Reviewed the following topics:    Functions of the sensor, phone/reader, and overlap patches   Reviewed how to scan every 8 hours; warm up period of 1hr if using Jonathan 2  Test blood glucose if symptoms do not match sensor reading.  How to handle problems like MRI, CT scans, travel, etc.  Explained how to change enter sensor code q 14 days  Showed pt how to change high/low alert as well as to see when sensor expires.  Reviewed how to remove sensor in 14 days.  Reviewed sensor expiration alerts.  Instructed pt not to inject insulin within 3 inches of sensor  Instructed pt they will have to return to fingerstick BG testing temporarily if out of sensors/reader  Reviewed packing/supplies  Confirmed patient is getting supplies from PHARMACY      Phone:  - Downloaded Jonathan patria on phone  - Invited/Connected to [employer]      Future Appointments   Date Time Provider Department Center   8/12/2024  9:30 AM [facility] DIABETIC EDUCATOR ENDO [facility] [place]   11/11/2024  8:45 AM Candice Toney APRN [facility] [place]   1/13/2025  9:30 AM Alen Henson DO [facility] [address]   2/10/2025  8:30 AM Francine Dorantes DO [id number] [facility] [place]        8/12/2024  Marianela Caldwell RN, MSN, BC-ADM, [facility]  Diabetes Care &      A total of 30 minutes was spent with the patient including chart review, discussion and education / advisement pertinent to patient and provider specified concerns as documented above.     Note to patient: The 21 Century Cures Act makes medical notes like these available to patients in the interest of transparency. However, be advised this is a medical document. It is intended as peer to peer communication. It is  written in medical language and may contain abbreviations or verbiage that are unfamiliar. It may appear blunt or direct. Medical documents are intended to carry relevant information, facts as evident, and the clinical opinion of the practitioner.

## 2024-08-15 ENCOUNTER — TELEPHONE (OUTPATIENT)
Dept: FAMILY MEDICINE CLINIC | Facility: CLINIC | Age: 64
End: 2024-08-15

## 2024-08-15 ENCOUNTER — TELEPHONE (OUTPATIENT)
Facility: CLINIC | Age: 64
End: 2024-08-15

## 2024-08-15 NOTE — TELEPHONE ENCOUNTER
Pt phoned into clinic requesting that his CGM be sent via [employer] as it is preferred by his insurance.

## 2024-08-15 NOTE — TELEPHONE ENCOUNTER
RN phoned patient to discuss- patient states he just hung up with [facility] and was told they do not have prescription    Reviewed that RN spoke with them earlier and prescription should be in their system.     Patient to reach out if he does not hear anything.

## 2024-08-15 NOTE — TELEPHONE ENCOUNTER
Received request from [facility] regarding continuous glucose monitor.   Sheba sent this to [facility] last.   Call[facility] patient to [facility] and he did stating [place] was too expensive and [facility]. Faxed paperwork with note to sheba Dorantes for pharmacy change.

## 2024-08-23 NOTE — PROGRESS NOTES
Continuous Glucose Monitor Download - Jonathan 3    Zeeshan Yang  3/22/1960    Referred by: Francine Dorantes DO       Medical Background      Past Medical History:    Acquired hypothyroidism    Adjustment disorder    With mixed depression and anxiety- psychology dr trice mcadams 3.2017    Asthma (HCC)    Carotid stenosis    Depression    Diabetes (HCC)    Diastolic dysfunction    Diverticula of colon    of sigmoid colon    Empty sella (HCC)    a 1.2 cm mass is identified within th pituitary gland consistent with empty sella, a benign process    GERD (gastroesophageal reflux disease)    Hyperlipidemia    Hypogonadotropic hypogonadism (HCC)    Internal hemorrhoids    Low back pain    recurrent    Mixed hyperlipidemia    Mononucleosis    Murmur, cardiac    BELLA on CPAP    Schatzki's ring    Sleep apnea    Type 2 diabetes mellitus, uncontrolled      Lab Results   Component Value Date    A1C 7.4 (H) 07/10/2024    A1C 7.1 (H) 01/08/2024    A1C 7.3 (H) 07/17/2023    A1C 7.2 (H) 02/27/2023    A1C 7.8 (H) 11/15/2022         Patient has T2DM, seen today regarding Bg check in after CGM start    Medication Review      DM Meds: Invokana Tabs - 100 MG; metFORMIN HCl Tabs - 1000 MG; Ozempic (2 MG/DOSE) Sopn - 8 MG/3ML        Patient States Currently Taking:   - Metformin 1000 mg twice daily --> taking as directed   - Invokana 100 mg daily --> taking as directed   - Ozempic 2 mg weekly --> taking on Mondays, a little bit of nausea first day but goes away     Blood Glucose Review          Interpretation of Data:  TIR well within goal, limited hyperglycemia and few areas of hypoglycemia noted in 14 day data report    Patient Concerns      - None today, really liking the Jonathan finds is informative and useful for deciding how to alter diet decisions      Recommendations / Plan      - None today from CDCES perspective     Future Appointments   Date Time Provider Department Center   8/26/2024  8:30 AM [facility] DIABETIC EDUCATOR ENDO [facility]  [place]   11/11/2024  8:45 AM Candice Toney APRN [facility] [place]   1/13/2025  9:30 AM Alen Henson, DO [facility] [address]   2/10/2025  8:30 AM Jose ELesiaa, DO [id number] [facility] [place]        Routed to provider for review.    8/23/2024  Marianela Caldwell RN, MSN, BC-[facility], [facility]  Diabetes Care &      A total of 10 minutes was spent with the patient including chart review, discussion and education / advisement pertinent to patient and provider specified concerns as documented above.     Note to patient: The 21 Century Cures Act makes medical notes like these available to patients in the interest of transparency. However, be advised this is a medical document. It is intended as peer to peer communication. It is written in medical language and may contain abbreviations or verbiage that are unfamiliar. It may appear blunt or direct. Medical documents are intended to carry relevant information, facts as evident, and the clinical opinion of the practitioner.

## 2024-08-26 ENCOUNTER — NURSE ONLY (OUTPATIENT)
Facility: CLINIC | Age: 64
End: 2024-08-26
Payer: COMMERCIAL

## 2024-08-26 DIAGNOSIS — E11.9 TYPE 2 DIABETES MELLITUS WITHOUT COMPLICATION, UNSPECIFIED WHETHER LONG TERM INSULIN USE (HCC): Primary | ICD-10-CM

## 2024-09-04 ENCOUNTER — TELEPHONE (OUTPATIENT)
Dept: FAMILY MEDICINE CLINIC | Facility: CLINIC | Age: 64
End: 2024-09-04

## 2024-09-04 NOTE — TELEPHONE ENCOUNTER
Patient would like a nurse to contact him.    Patient has an order from [facility] and would like to know if he really needs to complete the labs from [facility] if his kidney function is OK.  Patient was very frustrated as he has been on the phone for two hours trying to get an answer.  Patient disconnected the call while speaking with him.      Patient was unable to provide information for the lab ordered by provider with [facility].      Please call patient Ph. [phone]

## 2024-09-04 NOTE — TELEPHONE ENCOUNTER
Spoke with patient, it looks like lab orders were placed by [facility]. He is having gated CTA completed on 9/10/24, labs were ordered as pre-procedure. Advised patient labs are needed since he'll be getting contrast and July labs are likely too far out and they need a more updated value. Patient verbalized understanding and agreement. All questions answered.

## 2024-09-05 ENCOUNTER — LAB ENCOUNTER (OUTPATIENT)
Dept: LAB | Age: 64
End: 2024-09-05
Attending: INTERNAL MEDICINE
Payer: COMMERCIAL

## 2024-09-05 DIAGNOSIS — I10 ESSENTIAL HYPERTENSION, MALIGNANT: ICD-10-CM

## 2024-09-05 DIAGNOSIS — R07.9 CHEST PAIN, UNSPECIFIED: Primary | ICD-10-CM

## 2024-09-05 DIAGNOSIS — E78.5 HYPERLIPEMIA: ICD-10-CM

## 2024-09-05 LAB
BUN BLD-MCNC: 17 MG/DL (ref 9–23)
CREAT BLD-MCNC: 0.95 MG/DL
EGFRCR SERPLBLD CKD-EPI 2021: 90 ML/MIN/1.73M2 (ref 60–?)

## 2024-09-05 PROCEDURE — 36415 COLL VENOUS BLD VENIPUNCTURE: CPT

## 2024-09-05 PROCEDURE — 82565 ASSAY OF CREATININE: CPT

## 2024-09-05 PROCEDURE — 84520 ASSAY OF UREA NITROGEN: CPT

## 2024-09-09 DIAGNOSIS — I10 ESSENTIAL HYPERTENSION: ICD-10-CM

## 2024-09-12 RX ORDER — LOSARTAN POTASSIUM 50 MG/1
50 TABLET ORAL DAILY
Qty: 90 TABLET | Refills: 2 | Status: SHIPPED | OUTPATIENT
Start: 2024-09-12

## 2024-09-12 NOTE — TELEPHONE ENCOUNTER
Requested Prescriptions     Pending Prescriptions Disp Refills    LOSARTAN 50 MG Oral Tab [Pharmacy Med Name: Losartan Potassium 50 MG Oral Tablet] 90 tablet 3     Sig: TAKE 1 TABLET BY MOUTH DAILY       LOV: 7/16/24  RTC:   Last Relevant Labs:   Filled: 10/30/23 #90 with 3 refills    Future Appointments   Date Time Provider Department Center   11/11/2024  8:45 AM Candice Toney APRN [facility] [place]   1/13/2025  9:30 AM Alen Henson DO [facility] [address]   2/10/2025  8:30 AM Francine Dorantes DO [id number] [facility] [place]

## 2024-09-14 ENCOUNTER — TELEPHONE (OUTPATIENT)
Dept: FAMILY MEDICINE CLINIC | Facility: CLINIC | Age: 64
End: 2024-09-14

## 2024-09-14 DIAGNOSIS — I10 ESSENTIAL HYPERTENSION: ICD-10-CM

## 2024-09-14 RX ORDER — LOSARTAN POTASSIUM 50 MG/1
50 TABLET ORAL DAILY
Qty: 90 TABLET | Refills: 2 | OUTPATIENT
Start: 2024-09-14

## 2024-10-02 ENCOUNTER — PATIENT MESSAGE (OUTPATIENT)
Dept: FAMILY MEDICINE CLINIC | Facility: CLINIC | Age: 64
End: 2024-10-02

## 2024-10-03 NOTE — TELEPHONE ENCOUNTER
From: Zeeshan Yang  To: Alen Henson  Sent: 10/2/2024 7:06 PM CDT  Subject: Luz prescription refill? I received this text from my prescription renewal from [facility] today.     [facility]: We reached out to your doctor to get approval for the meds in order# [id number] but did not hear back. Without doctor approval we need to close this order.    1)LOS    If you'd like us to fill your order, please call your doctor and have them send a new script to [facility].    Text STOP to unsub

## 2024-10-23 RX ORDER — ATORVASTATIN CALCIUM 20 MG/1
20 TABLET, FILM COATED ORAL NIGHTLY
Qty: 90 TABLET | Refills: 0 | Status: SHIPPED | OUTPATIENT
Start: 2024-10-23

## 2024-10-23 NOTE — TELEPHONE ENCOUNTER
Requesting Atorvastatin 20mg  LOV: 7/16/24  RTC: 6 months  Last Relevant Labs: 7/10/24  Filled: 6/21/24 #90 with 1 refills    Future Appointments   Date Time Provider Department Center   10/28/2024 10:00 AM [facility] 20 NURSE [facility] [address]   11/11/2024  8:45 AM aCndice Toney APRN [facility] [place]   12/31/2024 10:30 AM Alen Henson DO [facility] [address]   2/10/2025  8:30 AM Francine Dorantes DO [id number] [facility] [place]     Cholesterol Medication Protocol Woiilr80/22/2024 09:30 PM   Protocol Details   ALT < 80    ALT resulted within past year    Lipid panel within past 12 months    In person appointment or virtual visit in the past 12 mos or appointment in next 3 mos     Rx sent to pharmacy per protocol

## 2024-10-28 ENCOUNTER — IMMUNIZATION (OUTPATIENT)
Dept: FAMILY MEDICINE CLINIC | Facility: CLINIC | Age: 64
End: 2024-10-28
Payer: COMMERCIAL

## 2024-10-28 DIAGNOSIS — Z23 NEED FOR VACCINATION: Primary | ICD-10-CM

## 2024-10-28 PROCEDURE — 90656 IIV3 VACC NO PRSV 0.5 ML IM: CPT | Performed by: FAMILY MEDICINE

## 2024-10-28 PROCEDURE — 90471 IMMUNIZATION ADMIN: CPT | Performed by: FAMILY MEDICINE

## 2024-11-01 ENCOUNTER — TELEPHONE (OUTPATIENT)
Facility: CLINIC | Age: 64
End: 2024-11-01

## 2024-11-01 DIAGNOSIS — E11.9 TYPE 2 DIABETES MELLITUS WITHOUT COMPLICATION, UNSPECIFIED WHETHER LONG TERM INSULIN USE (HCC): Primary | ICD-10-CM

## 2024-11-01 RX ORDER — SEMAGLUTIDE 2.68 MG/ML
2 INJECTION, SOLUTION SUBCUTANEOUS WEEKLY
Qty: 9 ML | Refills: 0 | Status: SHIPPED | OUTPATIENT
Start: 2024-11-01 | End: 2024-12-24

## 2024-11-01 NOTE — TELEPHONE ENCOUNTER
Endocrine Refill protocol for oral and injectable diabetic medications    Protocol Criteria:  PASSED  Reason: N/A    If all below requirements are met, send a 90-day supply with 1 refill per provider protocol.    Verify appointment with Endocrinology completed in the last 6 months or scheduled in the next 3 months.  Verify A1C has been completed within the last 6 months and is below 8.5%     Last completed office visit: 8/6/2024 Francine Dorantes DO   Next scheduled Follow up:   Future Appointments   Date Time Provider Department Center   11/11/2024  8:45 AM Candice Toeny APRN [facility] [place]   12/31/2024 10:30 AM Alen Henson DO [facility] [address] [facility] [address]   2/10/2025  8:30 AM Francine Dorantes DO [id number] [facility] [place]      Last A1c result: Last A1c value was 7.4% done 7/10/2024.    Last office note: Ozempic 2 mg weekly     Passed and ordered per protocol.    Routed as SVETLANA

## 2024-11-01 NOTE — TELEPHONE ENCOUNTER
11/01/2024, received via fax from [facility] in regards to medication prescription request for Ozempic Pen. Placed in PA in basket in suit 208

## 2024-12-03 DIAGNOSIS — F32.A DEPRESSION, UNSPECIFIED DEPRESSION TYPE: ICD-10-CM

## 2024-12-05 RX ORDER — ESCITALOPRAM OXALATE 10 MG/1
10 TABLET ORAL DAILY
Qty: 90 TABLET | Refills: 3 | Status: SHIPPED | OUTPATIENT
Start: 2024-12-05

## 2024-12-05 NOTE — TELEPHONE ENCOUNTER
Requested Prescriptions     Pending Prescriptions Disp Refills    ESCITALOPRAM 10 MG Oral Tab [Pharmacy Med Name: Escitalopram Oxalate 10 MG Oral Tablet] 90 tablet 3     Sig: TAKE 1 TABLET BY MOUTH DAILY     LOV: 7/16/24  RTC: 6m  Last Relevant Labs: 7/16/24  Filled: 1/30/24 #90 with 3 refills    Future Appointments   Date Time Provider Department Center   12/24/2024  8:15 AM Candice Toney APRN [facility]   12/31/2024 10:30 AM Alen Henson DO [facility] [address]   2/10/2025  8:30 AM Francine Dorantes DO [id number]

## 2024-12-24 ENCOUNTER — OFFICE VISIT (OUTPATIENT)
Facility: CLINIC | Age: 64
End: 2024-12-24
Payer: COMMERCIAL

## 2024-12-24 VITALS
BODY MASS INDEX: 24.08 KG/M2 | WEIGHT: 172 LBS | OXYGEN SATURATION: 97 % | HEART RATE: 92 BPM | SYSTOLIC BLOOD PRESSURE: 114 MMHG | DIASTOLIC BLOOD PRESSURE: 68 MMHG | HEIGHT: 71 IN

## 2024-12-24 DIAGNOSIS — E55.9 VITAMIN D DEFICIENCY: ICD-10-CM

## 2024-12-24 DIAGNOSIS — E03.8 SECONDARY HYPOTHYROIDISM: ICD-10-CM

## 2024-12-24 DIAGNOSIS — I10 ESSENTIAL HYPERTENSION: ICD-10-CM

## 2024-12-24 DIAGNOSIS — E23.0 HYPOGONADOTROPIC HYPOGONADISM (HCC): ICD-10-CM

## 2024-12-24 DIAGNOSIS — E78.5 HYPERLIPIDEMIA, UNSPECIFIED HYPERLIPIDEMIA TYPE: ICD-10-CM

## 2024-12-24 DIAGNOSIS — E23.6 EMPTY SELLA (HCC): ICD-10-CM

## 2024-12-24 DIAGNOSIS — E11.40 TYPE 2 DIABETES MELLITUS WITH DIABETIC NEUROPATHY, WITHOUT LONG-TERM CURRENT USE OF INSULIN (HCC): Primary | ICD-10-CM

## 2024-12-24 LAB — HEMOGLOBIN A1C: 6.6 % (ref 4.3–5.6)

## 2024-12-24 PROCEDURE — 3074F SYST BP LT 130 MM HG: CPT | Performed by: NURSE PRACTITIONER

## 2024-12-24 PROCEDURE — 3078F DIAST BP <80 MM HG: CPT | Performed by: NURSE PRACTITIONER

## 2024-12-24 PROCEDURE — 3008F BODY MASS INDEX DOCD: CPT | Performed by: NURSE PRACTITIONER

## 2024-12-24 PROCEDURE — 99214 OFFICE O/P EST MOD 30 MIN: CPT | Performed by: NURSE PRACTITIONER

## 2024-12-24 PROCEDURE — 95251 CONT GLUC MNTR ANALYSIS I&R: CPT | Performed by: NURSE PRACTITIONER

## 2024-12-24 PROCEDURE — 3044F HG A1C LEVEL LT 7.0%: CPT | Performed by: NURSE PRACTITIONER

## 2024-12-24 PROCEDURE — 83036 HEMOGLOBIN GLYCOSYLATED A1C: CPT | Performed by: NURSE PRACTITIONER

## 2024-12-24 PROCEDURE — G2211 COMPLEX E/M VISIT ADD ON: HCPCS | Performed by: NURSE PRACTITIONER

## 2024-12-24 RX ORDER — CANAGLIFLOZIN 100 MG/1
100 TABLET, FILM COATED ORAL DAILY
Qty: 90 TABLET | Refills: 0 | Status: SHIPPED | OUTPATIENT
Start: 2024-12-24

## 2024-12-24 RX ORDER — SEMAGLUTIDE 2.68 MG/ML
2 INJECTION, SOLUTION SUBCUTANEOUS WEEKLY
Qty: 9 ML | Refills: 0 | Status: SHIPPED | OUTPATIENT
Start: 2024-12-24 | End: 2025-03-24

## 2024-12-24 RX ORDER — ACYCLOVIR 800 MG/1
1 TABLET ORAL
Qty: 6 EACH | Refills: 1 | Status: SHIPPED | OUTPATIENT
Start: 2024-12-24 | End: 2024-12-29

## 2024-12-24 NOTE — PROGRESS NOTES
[facility] Note    Name: Zeeshan Yang    Date: 24    Zeeshan Yang is a 64 year old male who presents for evaluation of T2DM management.     Chief complaint: Diabetes (PT here for routine DM2 f/u, per pt no current concerns or sx.  Previous patient of Dr. Dorantes.  Per pt checks BG via CGM/Last A1c value was 7.4% done 7/10/204)       Subjective:   ##DM hx:  -Diagnosed with diabetes in   -Family history- yes, strong diabetes family hx      Initial HPI consult in 2024 with Dr. Dorantes  Hx of hypogonadotropic hypogonadism, HLD, HTN, hypothyroidism, BELLA on CPAP.  Patient was previously following with Dr. Christel Nielson, last visit 3/2024.  DM meds at first office visit: Metformin 1000 mg BID, Invokana 100 mg daily, Ozempic 2 mg weekly     Blood Glucose log reviewed. Checking 3-4 times per day. Av. Morning/fastin-140, pre-meal: 125-170, episodes of hypoglycemia: No     -Re: potential DM medication contraindication (if positive, checkbox selected):  [] History of pancreatitis  [] Personal/fam hx of medullary thyroid cancer/MEN2  [] History of recent/frequent UTI/yeast infxn  [] Previous amputation related to diabetes    -Presence of associated DM complications (if positive, checkbox selected):  [] Macrovascular complications (CAD/CVA/PAD)  [x] Neuropathy (hx of carpal tunnel and sciatica)  [] Retinopathy  [] Nephropathy  [x] HTN  [x] Hyperlipidemia  [] Stroke/TIA  [] Gastroparesis    -Lifestyle: does note dietary indiscretions; wife bakes a lot   - Modifying factors: works nightshift   120-135 when sleeping overnight  105-119 when sleeping during the day    Previously trialed/failed DM meds: Januvia in     ##Has a hx of hypogonadotropic hypogonadism and central hypothyroidism. An MRI of the sella was performed 2013 1.2 cm abnormality c/w empty sella and an MRI of the sella was performed 2011 1.2 cm abnormality c/w empty sella.  Previously tried Tirosint and  testosterone replacement therapy.  Patient had side effects with both and these were ultimately discontinued.  Had discussion regarding indications with previous endocrinologist and chose to remain off of testosterone and thyroid medication.  Denies any symptoms of hypothyroidism or low testosterone at this time.    INTERIM HX with MATI Harvey 12/24/24 with MATI Harvey :    Denies any concerns, not in any testosterone treatment at this time. States jonathan is very helpful   Current treatment:  Metformin 1000 mg BID, Invokana 100 mg daily, Ozempic 2 mg weekly    Testing: Continuous Glucose Monitoring Interpretation  Zeeshan Yang  has undergone continuous glucose monitoring with the Freestyle Jonathan 3 CGM with phone (connected to clinic profile).  The blood glucose tracings were evaluated for two weeks prior to office visit. Blood glucose tracings demonstrated no significant hyperglycemia. There were no significant hypoglycemia notedduring the weeks of evaluation.    Date: 12/10-12/23/24: TIR 93%, GMI 6.5% ,  low 0% , very low 0%, SD 22mg/dl, Sensor usage: 93%    Hypoglycemia: denies   Complication:  no hospitalization and no ER visit since last office visit.   States with his hx of sleep apnea, he uses cpap daily   Denies falls in the last 12 months, no hx of fracture in his lifetime.   States he has neuropathy /numbness to left foot. He had sciatica and got injection few years ago and he the neuropathy occurred after he had that injection. He states feeling like there is a tourniquet to his left foot, states its tolerable, no pain, but he thinks its not related to his DM.     History/Other:    Allergies, PMH, SocHx and FHx reviewed and updated as appropriate in Epic on    Continuous Glucose Sensor (FREESTYLE JONATHAN 3 SENSOR) Does not apply Misc 1 each every 14 (fourteen) days. 6 each 1    Canagliflozin (INVOKANA) 100 MG Oral Tab Take 100 mg by mouth daily. 90 tablet 0    metFORMIN HCl 1000 MG Oral Tab Take 1 tablet  (1,000 mg total) by mouth 2 (two) times daily with meals. 180 tablet 0    OZEMPIC, 2 MG/DOSE, 8 MG/3ML Subcutaneous Solution Pen-injector Inject 2 mg into the skin once a week. 9 mL 0    ESCITALOPRAM 10 MG Oral Tab TAKE 1 TABLET BY MOUTH DAILY 90 tablet 3    atorvastatin 20 MG Oral Tab TAKE 1 TABLET BY MOUTH NIGHTLY 90 tablet 0    losartan 50 MG Oral Tab TAKE 1 TABLET BY MOUTH DAILY 90 tablet 2    Blood Glucose Monitoring Suppl (ONETOUCH VERIO) w/Device Does not apply Kit 1 each daily. 1 kit 0    aspirin 81 MG Oral Tab EC Take 1 tablet (81 mg total) by mouth daily.      OMEPRAZOLE 20 MG Oral Capsule Delayed Release TAKE 1 CAPSULE BY MOUTH DAILY 90 capsule 3    albuterol (VENTOLIN HFA) 108 (90 Base) MCG/ACT Inhalation Aero Soln Inhale 2 puffs into the lungs every 4 (four) hours as needed for Wheezing or Shortness of Breath. 1 each 3    EPINEPHrine (EPIPEN 2-SELVIN) 0.3 MG/0.3ML Injection Solution Auto-injector Take as directed--brand or generic ok 2 each 0    Insulin Pen Needle (PEN NEEDLES) 32G X 4 MM Does not apply Misc 1 each by Does not apply route every 7 days. 90 each 0     Allergies   Allergen Reactions    Dust Mites ASTHMA    Pollen OTHER (SEE COMMENTS)    Ragweed OTHER (SEE COMMENTS)    Tree Extract OTHER (SEE COMMENTS)     Current Outpatient Medications   Medication Sig Dispense Refill    Continuous Glucose Sensor (FREESTYLE MIRTHA 3 SENSOR) Does not apply Misc 1 each every 14 (fourteen) days. 6 each 1    Canagliflozin (INVOKANA) 100 MG Oral Tab Take 100 mg by mouth daily. 90 tablet 0    metFORMIN HCl 1000 MG Oral Tab Take 1 tablet (1,000 mg total) by mouth 2 (two) times daily with meals. 180 tablet 0    OZEMPIC, 2 MG/DOSE, 8 MG/3ML Subcutaneous Solution Pen-injector Inject 2 mg into the skin once a week. 9 mL 0    ESCITALOPRAM 10 MG Oral Tab TAKE 1 TABLET BY MOUTH DAILY 90 tablet 3    atorvastatin 20 MG Oral Tab TAKE 1 TABLET BY MOUTH NIGHTLY 90 tablet 0    losartan 50 MG Oral Tab TAKE 1 TABLET BY MOUTH DAILY  90 tablet 2    Blood Glucose Monitoring Suppl (ONETOUCH VERIO) w/Device Does not apply Kit 1 each daily. 1 kit 0    aspirin 81 MG Oral Tab EC Take 1 tablet (81 mg total) by mouth daily.      OMEPRAZOLE 20 MG Oral Capsule Delayed Release TAKE 1 CAPSULE BY MOUTH DAILY 90 capsule 3    albuterol (VENTOLIN HFA) 108 (90 Base) MCG/ACT Inhalation Aero Soln Inhale 2 puffs into the lungs every 4 (four) hours as needed for Wheezing or Shortness of Breath. 1 each 3    EPINEPHrine (EPIPEN 2-SELVIN) 0.3 MG/0.3ML Injection Solution Auto-injector Take as directed--brand or generic ok 2 each 0    Insulin Pen Needle (PEN NEEDLES) 32G X 4 MM Does not apply Misc 1 each by Does not apply route every 7 days. 90 each 0    ergocalciferol 1.25 MG (79336 UT) Oral Cap Take 1 capsule (50,000 Units total) by mouth once a week. 12 capsule 0    Glucose Blood (ONETOUCH VERIO) In Vitro Strip Test 2x daily 200 strip 0    OneTouch Delica Lancets 33G Does not apply Misc Test 2 x daily 200 each 0     Past Medical History:    Acquired hypothyroidism    Adjustment disorder    With mixed depression and anxiety- psychology dr tirce mcadams 3.2017    Asthma (HCC)    Carotid stenosis    Depression    Diabetes (HCC)    Diastolic dysfunction    Diverticula of colon    of sigmoid colon    Empty sella (HCC)    a 1.2 cm mass is identified within th pituitary gland consistent with empty sella, a benign process    GERD (gastroesophageal reflux disease)    Hyperlipidemia    Hypogonadotropic hypogonadism (HCC)    Internal hemorrhoids    Low back pain    recurrent    Mixed hyperlipidemia    Mononucleosis    Murmur, cardiac    BELLA on CPAP    Schatzki's ring    Sleep apnea    Type 2 diabetes mellitus, uncontrolled     Family History   Problem Relation Age of Onset    Diabetes Mother     Other (COPD) Mother     Asthma Mother     Depression Mother     Other (lung cancer) Father     Other (heroin use) Sister     Heart Attack Maternal Grandfather     Other (colon cancer) Other      Bipolar Disorder Daughter     Depression Daughter     Prostate Cancer Neg      Social history: Reviewed.      ROS/Exam    REVIEW OF SYSTEMS: Ten point review of systems has been performed and is otherwise negative and/or non-contributory, except as described above.     VITALS  Vitals:    12/24/24 0823   BP: 114/68   Pulse: 92   SpO2: 97%   Weight: 172 lb (78 kg)   Height: 5' 11\" (1.803 m)       Wt Readings from Last 6 Encounters:   12/24/24 172 lb (78 kg)   08/06/24 180 lb (81.6 kg)   07/16/24 180 lb (81.6 kg)   01/16/24 180 lb (81.6 kg)   10/16/23 180 lb (81.6 kg)   08/28/23 182 lb 8.7 oz (82.8 kg)       PHYSICAL EXAM  CONSTITUTIONAL:  awake, alert, cooperative, no apparent distress   PSYCH: normal affect  LUNGS: breathing comfortably  CARDIOVASCULAR:  regular rate   NECK:  no palpable thyroid nodules    Musculoskeletal:  Diabetes foot exam:   Good foot hygiene.   Bilateral barefoot skin diabetic exam: normal.  Visualized feet and the appearance : normal.  Bilateral monofilament/sensation of both feet is normal. Vibration to dorsum to the first toe perceived.   Bilateral 2+ pedal pulse pedal pulse exam       Labs/Imaging:   Lab Results   Component Value Date    CHOLEST 123 07/10/2024    CHOLEST 95 01/08/2024    TRIG 72 07/10/2024    TRIG 86 01/08/2024    HDL 47 07/10/2024    HDL 44 01/08/2024    LDL 61 07/10/2024    LDL 34 01/08/2024     Lab Results   Component Value Date    MICROALBCREA 7.9 12/27/2024    MICROALBCREA  07/10/2024      Comment:      Unable to calculate due to Urine Microalbumin <0.5 mg/dL        Lab Results   Component Value Date    CREATSERUM 0.83 12/27/2024    CREATSERUM 0.95 09/05/2024    EGFRCR 98 12/27/2024    EGFRCR 90 09/05/2024     Lab Results   Component Value Date    AST 14 12/27/2024    AST 12 (L) 07/10/2024    ALT 10 12/27/2024    ALT 23 07/10/2024       Lab Results   Component Value Date    TSH 2.840 12/27/2024    TSH 1.258 08/06/2024    T4F 1.1 12/27/2024         Overall glucose  control:  Lab Results   Component Value Date    A1C 6.9 (H) 12/27/2024    A1C 6.6 (A) 12/24/2024    A1C 7.4 (H) 07/10/2024    A1C 7.1 (H) 01/08/2024    A1C 7.3 (H) 07/17/2023       Supplementary Documentation:   -Surveillance for Diabetes Complications & Risks  Foot exam/neuropathy: Last Foot Exam: 12/24/24    Retinopathy screening: Last Dilated Eye Exam: 02/08/24  Eye Exam shows Diabetic Retinopathy?: No      Assessment & Plan:     ICD-10-CM    1. Type 2 diabetes mellitus with diabetic neuropathy, without long-term current use of insulin ([facility])  E11.40 GLUC MNTR CONT REC FROM NTRSTL TISS FLU PHYS I&R      2. Essential hypertension  I10       3. Hyperlipidemia, unspecified hyperlipidemia type  E78.5       4. Secondary hypothyroidism  E03.8 TSH and Free T4     Continuous Glucose Sensor (FREESTYLE MIRTHA 3 SENSOR) Does not apply Misc      5. Vitamin D deficiency  E55.9 Vitamin D      6. Empty sella ([facility])  E23.6       7. Hypogonadotropic hypogonadism ([facility])  E23.0           Zeeshan Yang is a pleasant 64 year old male here for evaluation of:    #Diabetes- PMHx of Type 2 diabetes mellitus diagnosed in 2014.      - Last A1c value was 6.9% done 12/27/2024. At goal   - Goal <7%. Importance of better glucose control in preventing onset/progression of end-organ damage discussed, as well as goals of therapy and clinical significance of A1C.   Plan   - med changes:  - Continue  Metformin 1000 mg BID, Invokana 100 mg daily, Ozempic 2 mg weekly  - Schedule eye exam prior for February   - continue Freestyle Mirtha 3+ CGM with phone (connected to clinic profile)  - the patient is not on insulin and does not have history of recurrent hypoglycemia, CGM ordered, likely will have to pay out of pocket for CGM  - continue to check BG 2x/day using glucometer or CGM  - SGLT2i instructions provided re: surgery, times of illness/dehydration    -See above header \"Supplementary Documentation\" for surveillance for diabetes complications &  risks    #Nephropathy screening/CKD:   Lab Results   Component Value Date    EGFRCR 98 12/27/2024    MICROALBCREA 7.9 12/27/2024      #Blood pressure control: SBP is to goal <130   BP Readings from Last 1 Encounters:   12/24/24 114/68   Continue losartan Tabs - 50 MG     #Hyperlipidemia/Lipids: LDL is to goal   Lab Results   Component Value Date    LDL 61 07/10/2024    TRIG 72 07/10/2024   Continue atorvastatin Tabs - 20 MG ,no misses,      #Empty Sella  #Hypogonadotropic Hypogonadism  -previously Dr. Dorantes discussed Pathophysiology of condition, usual clinical course, rx options with associated indications/risks/benefits have previously been d/w pt in detail.  -He has sequalae of secondary hypothyroidism and secondary hypogonadism (for which he has deferred treatment for in the past)  -Last MRI stable in 2013.   -2016 Total Te low   -Had TRT in the past and did not like it, currently he still prefers to remain off of TRT   -Discussed to patient Consider DEXA at age 66  , he denied any hx of bone fracture in his lifetime.     #Central Hypothyroidism  -previously Dr. Dorantes discussed to patient Pathophysiology of condition and goals of therapy discussed    -Indications/risks/benefits of treatment vs no treatment d/w pt previously and at this visit   -Patient's TFTs (specifically his FT4 levels) have been monitored for several years.   -5/2023 TSH 1.82, FT4  0.7 --> 1/2024 TSH 1.77 (no FT4)   -He has prefered to stay off meds. Was previously on LT4 but had SE of anxiety  - last tsh 1.28 and FT4 1-8/6/24, normal result--> ordered tsh and ft4 and vitamin D level    Return in about 3 months (around 3/24/2025) for diabetes follow up.    Total time spent  35 minutes today on obtaining history, reviewing pertinent labs, evaluating patient, providing multiple treatment options, reinforcing diet/exercise and compliance, and completing documentation, of which greater than 50% was spent in face to face discussion with the  patient   who demonstrated understanding and agreement with plan.     Thank you for allowing me to participate in the care of this patient.  Please feel free to contact me with any questions.    MATI Gross, [facility]-BC  Endocrinology, Diabetes & Metabolism   12/24/24    In reviewing this note, please be advised that Dragon Voice Recognition software used to dictate the note may have made errors in recognizing some of the words or phrases.     Note to patient: The 21 Century Cures Act makes medical notes like these available to patients in the interest of transparency. However, be advised this is a medical document. It is intended as peer to peer communication. It is written in medical language and may contain abbreviations or verbiage that are unfamiliar. It may appear blunt or direct. Medical documents are intended to carry relevant information, facts as evident, and the clinical opinion of the practitioner.  cuments are intended to carry relevant information, facts as evident, and the clinical opinion of the practitioner.

## 2024-12-24 NOTE — PATIENT INSTRUCTIONS
Return Visit:  APN: Return in about 3 months (around 3/24/2025) for diabetes follow up.  [] Video visit  [] In person only  [x]  Physician   [] Dr. Avelar  [x] Dr. Dorantes  [] Dr. Joel  [] Dr. Gill   [] Any physician          [x]  Directions to 1st floor lab    []  Give blood sugar log  []  PAP paperwork for Ozempic/Jardiance (english/German)     Summary of today's visit:  Medication changes:    Continue  Metformin 1000 mg BID, Invokana 100 mg daily, Ozempic 2 mg weekly  - Schedule eye exam prior for February   - - Targeted fasting glucose (at least 8-10 hrs of no food/sweetened beverage intake)< 130 and 2 hrs after lunch or dinner< 180  and prior lunch or dinner <140  -  Follow 15g/15 min rule if you develop any hypoglycemia symptoms w/ glucose <70  but if glucose <55 follow 30g/15 min rule. Once glucose above 80, eat a protein or food w protein.    - if you develop any hypothryoid or hyperthyroid ymptoms let US know and will get labs done      Additional comments:   - If labs were ordered today, please complete labs today   - Please let us know if you require any refills at least 1 week prior to your medication running out   - Please call our office if sugars at home are consistently greater than 250 or less than 70     HOW TO TREAT LOW BLOOD SUGAR (Hypoglycemia)  Low blood sugar= Less than 70, or if you start to have symptoms (below)  Symptoms: Shaking or trembling, fast heart rate, extreme hunger, sweating, confusion/difficulty concentrating, dizziness.    How to treat a low blood sugar if you are able to eat/drink: The Rule of 15/15  If you are using continuous glucose monitor that says you are low, but you do not have any symptoms, verify on fingerstick that your blood sugar is actually low before treating.   Eat 15 grams of carbs (see examples below)  Check your blood sugar after 15 minutes. If it’s still below your target range, have another serving.   Repeat these steps until it’s in your target range.  Once it’s in range, if you're nervous about your sugar going low again, have a protein source (ie, a spoonful of peanut butter).   If you have a CGM you want to look for how your arrow has changed. If you arrow is pointed up or sideways after 15 min, give your CGM more time OR check with a finger stick. Try not to eat more food until at least 15 min after the first BG check - otherwise you risk having a rebound high.  If you are experiencing symptoms and you are unable to check your blood glucose for any reason, treat the hypoglycemia.  If someone has a low blood sugar and is unconscious: Don’t hesitate to call 911. If someone is unconscious and glucagon is not available or someone does not know how to use it, call 911 immediately.     To treat a low, I recommend you carry with you easy, pre-portioned treatment for low blood sugars that are 15G of carbs:   - Children sized squeeze pouch applesauce (high fiber + carbs help prevent too high of a spike)  - Small children's sized juicebox- 15g carb --> 4oz juice box  - Glucose tablets from [employer], you can find them near diabetes supplies --> Note, you will need to eat 3-4 tablets to get to 15g of carbs  - Children sized fruit snack pack- look for one with 15 grams of total carbohydrate  - Choice of how to treat your low is important. Complex carbs, or foods that contain fats along with carbs (like chocolate) can slow the absorption of glucose and should NOT be used to treat an emergency low

## 2024-12-27 ENCOUNTER — LAB ENCOUNTER (OUTPATIENT)
Dept: LAB | Age: 64
End: 2024-12-27
Attending: NURSE PRACTITIONER
Payer: COMMERCIAL

## 2024-12-27 DIAGNOSIS — E55.9 VITAMIN D DEFICIENCY: Primary | ICD-10-CM

## 2024-12-27 DIAGNOSIS — E55.9 VITAMIN D DEFICIENCY: ICD-10-CM

## 2024-12-27 DIAGNOSIS — E11.9 TYPE 2 DIABETES MELLITUS WITHOUT COMPLICATION, UNSPECIFIED WHETHER LONG TERM INSULIN USE (HCC): ICD-10-CM

## 2024-12-27 DIAGNOSIS — E03.8 SECONDARY HYPOTHYROIDISM: ICD-10-CM

## 2024-12-27 LAB
ALBUMIN SERPL-MCNC: 5 G/DL (ref 3.2–4.8)
ALBUMIN/GLOB SERPL: 2 {RATIO} (ref 1–2)
ALP LIVER SERPL-CCNC: 92 U/L
ALT SERPL-CCNC: 10 U/L
ANION GAP SERPL CALC-SCNC: 7 MMOL/L (ref 0–18)
AST SERPL-CCNC: 14 U/L (ref ?–34)
BILIRUB SERPL-MCNC: 0.7 MG/DL (ref 0.2–1.1)
BUN BLD-MCNC: 10 MG/DL (ref 9–23)
CALCIUM BLD-MCNC: 9.9 MG/DL (ref 8.7–10.4)
CHLORIDE SERPL-SCNC: 102 MMOL/L (ref 98–112)
CO2 SERPL-SCNC: 29 MMOL/L (ref 21–32)
CREAT BLD-MCNC: 0.83 MG/DL
CREAT UR-SCNC: 75.8 MG/DL
EGFRCR SERPLBLD CKD-EPI 2021: 98 ML/MIN/1.73M2 (ref 60–?)
EST. AVERAGE GLUCOSE BLD GHB EST-MCNC: 151 MG/DL (ref 68–126)
FASTING STATUS PATIENT QL REPORTED: YES
GLOBULIN PLAS-MCNC: 2.5 G/DL (ref 2–3.5)
GLUCOSE BLD-MCNC: 137 MG/DL (ref 70–99)
HBA1C MFR BLD: 6.9 % (ref ?–5.7)
MICROALBUMIN UR-MCNC: 0.6 MG/DL
MICROALBUMIN/CREAT 24H UR-RTO: 7.9 UG/MG (ref ?–30)
OSMOLALITY SERPL CALC.SUM OF ELEC: 287 MOSM/KG (ref 275–295)
POTASSIUM SERPL-SCNC: 4 MMOL/L (ref 3.5–5.1)
PROT SERPL-MCNC: 7.5 G/DL (ref 5.7–8.2)
SODIUM SERPL-SCNC: 138 MMOL/L (ref 136–145)
T4 FREE SERPL-MCNC: 1.1 NG/DL (ref 0.8–1.7)
TSI SER-ACNC: 2.84 UIU/ML (ref 0.55–4.78)
VIT D+METAB SERPL-MCNC: 21.2 NG/ML (ref 30–100)

## 2024-12-27 PROCEDURE — 84443 ASSAY THYROID STIM HORMONE: CPT

## 2024-12-27 PROCEDURE — 36415 COLL VENOUS BLD VENIPUNCTURE: CPT

## 2024-12-27 PROCEDURE — 84439 ASSAY OF FREE THYROXINE: CPT

## 2024-12-27 PROCEDURE — 80053 COMPREHEN METABOLIC PANEL: CPT

## 2024-12-27 PROCEDURE — 82043 UR ALBUMIN QUANTITATIVE: CPT

## 2024-12-27 PROCEDURE — 82306 VITAMIN D 25 HYDROXY: CPT

## 2024-12-27 PROCEDURE — 82570 ASSAY OF URINE CREATININE: CPT

## 2024-12-27 PROCEDURE — 83036 HEMOGLOBIN GLYCOSYLATED A1C: CPT

## 2024-12-27 RX ORDER — ERGOCALCIFEROL 1.25 MG/1
50000 CAPSULE, LIQUID FILLED ORAL WEEKLY
Qty: 12 CAPSULE | Refills: 0 | Status: SHIPPED | OUTPATIENT
Start: 2024-12-27 | End: 2025-03-27

## 2024-12-29 RX ORDER — HYDROCHLOROTHIAZIDE 12.5 MG/1
1 CAPSULE ORAL
Qty: 2 EACH | Refills: 2 | Status: SHIPPED | OUTPATIENT
Start: 2024-12-29

## 2024-12-30 RX ORDER — ATORVASTATIN CALCIUM 20 MG/1
20 TABLET, FILM COATED ORAL NIGHTLY
Qty: 90 TABLET | Refills: 1 | Status: SHIPPED | OUTPATIENT
Start: 2024-12-30

## 2024-12-30 NOTE — TELEPHONE ENCOUNTER
Requesting Atorvastatin 20mg  LOV: 7/16/24  RTC: 6 months  Last Relevant Labs: 7/10/24  Filled: 9/23/24 #90 with 0 refills    Future Appointments   Date Time Provider Department Center   12/31/2024 10:30 AM Alen Henson DO [facility] [address]   3/25/2025  9:30 AM Francine Dorantes DO [id number]     Cholesterol Medication Protocol [id number]/2024 12:07 PM   Protocol Details   ALT < 80    ALT resulted within past year    Lipid panel within past 12 months    In person appointment or virtual visit in the past 12 mos or appointment in next 3 mos     Rx sent to pharmacy per protocol

## 2024-12-31 ENCOUNTER — OFFICE VISIT (OUTPATIENT)
Dept: FAMILY MEDICINE CLINIC | Facility: CLINIC | Age: 64
End: 2024-12-31
Payer: COMMERCIAL

## 2024-12-31 VITALS
TEMPERATURE: 98 F | WEIGHT: 175 LBS | SYSTOLIC BLOOD PRESSURE: 110 MMHG | HEIGHT: 71 IN | BODY MASS INDEX: 24.5 KG/M2 | DIASTOLIC BLOOD PRESSURE: 70 MMHG | HEART RATE: 97 BPM | RESPIRATION RATE: 16 BRPM | OXYGEN SATURATION: 99 %

## 2024-12-31 DIAGNOSIS — I10 ESSENTIAL HYPERTENSION: ICD-10-CM

## 2024-12-31 DIAGNOSIS — K21.9 GASTROESOPHAGEAL REFLUX DISEASE WITHOUT ESOPHAGITIS: ICD-10-CM

## 2024-12-31 DIAGNOSIS — Z00.00 WELL ADULT EXAM: Primary | ICD-10-CM

## 2024-12-31 DIAGNOSIS — J45.20 MILD INTERMITTENT ASTHMA WITHOUT COMPLICATION (HCC): ICD-10-CM

## 2024-12-31 DIAGNOSIS — K22.2 SCHATZKI'S RING: ICD-10-CM

## 2024-12-31 DIAGNOSIS — E78.2 MIXED HYPERLIPIDEMIA: ICD-10-CM

## 2024-12-31 DIAGNOSIS — E11.9 TYPE 2 DIABETES MELLITUS WITHOUT COMPLICATION, WITHOUT LONG-TERM CURRENT USE OF INSULIN (HCC): ICD-10-CM

## 2024-12-31 DIAGNOSIS — G47.33 OSA ON CPAP: ICD-10-CM

## 2024-12-31 PROCEDURE — 3078F DIAST BP <80 MM HG: CPT | Performed by: FAMILY MEDICINE

## 2024-12-31 PROCEDURE — 3074F SYST BP LT 130 MM HG: CPT | Performed by: FAMILY MEDICINE

## 2024-12-31 PROCEDURE — 3061F NEG MICROALBUMINURIA REV: CPT | Performed by: FAMILY MEDICINE

## 2024-12-31 PROCEDURE — 3008F BODY MASS INDEX DOCD: CPT | Performed by: FAMILY MEDICINE

## 2024-12-31 PROCEDURE — 99396 PREV VISIT EST AGE 40-64: CPT | Performed by: FAMILY MEDICINE

## 2024-12-31 PROCEDURE — 3044F HG A1C LEVEL LT 7.0%: CPT | Performed by: FAMILY MEDICINE

## 2024-12-31 RX ORDER — ERGOCALCIFEROL 1.25 MG/1
50000 CAPSULE, LIQUID FILLED ORAL WEEKLY
Qty: 12 CAPSULE | Refills: 0 | Status: SHIPPED | OUTPATIENT
Start: 2024-12-31 | End: 2025-03-31

## 2024-12-31 NOTE — PROGRESS NOTES
Note to patient: The 21st Century Cures Act makes medical notes like these available to patients in the interest of transparency. However, be advised this is a medical document. It is intended as peer to peer communication. It is written in medical language and may contain abbreviations or verbiage that are unfamiliar. It may appear blunt or direct. Medical documents are intended to carry relevant information, facts as evident, and the clinical opinion of the practitioner.         Chief Complaint   Patient presents with    Lab Results    Physical       HPI:  Patient is here for physical.     Labs reviewed:   Normal tsh  Vitamin D is 21- he is on rx for weekly supplement sent by Endo. It got sent wto wrong pharmacy. I will resend the script.   I recommended daily 2000 U of vit d otc on regular basis.   Microalbumin is normal   CMP shows normal kidney function     Hx of back pain with left foot paresthesia s/p 1997 for L5-S1 fused.   BELLA- on CPAP machine.   Takes prilosec daily for acid reflux due to hernia.     T2DM:   Last A1c value was 6.9% done 12/27/2024.  Following with She[facility]     He is using CGM- reports it has been very insightful.   DM Meds:   Invokana Tabs - 100 MG  metformin   - 1000 MG bid   Ozempic  - 2mg weekly   He is also on statin  Using CGM  Patient takes daily asa.     Hx of chest pain- seen by cardio.   He had nuclear stress test on 10/2024- unremarkable.  Follows with Dr. Eric- EP      PSHx: sinus surgery, L5-S1 fusion, carpal tunnel syndrome/left trigger finger    FMHx:  -maternal: DMII, colon cancer- uncles (37-77)   -paternal: DMII  Smoking: none  Alcohol: occasionally   Drugs: none   Occupation:    Colonoscopy: 2022- 10 years   PSA: normal   Tdap: 2020        Review of Systems   Constitutional: Negative for fever, chills and fatigue. No distress.  HENT: Negative for hearing loss, congestion, sore throat, neck pain   Eyes: Negative for pain and visual disturbance.   Respiratory:  Negative for cough, chest tightness, shortness of breath and wheezing.    Cardiovascular: Negative for chest pain, palpitations and leg swelling.   Gastrointestinal: Negative for nausea, vomiting, abdominal pain, diarrhea, blood in stool and abdominal distention.   Genitourinary: Negative for dysuria, hematuria and difficulty urinating.      Patient Active Problem List   Diagnosis    BELLA on CPAP    Type 2 diabetes mellitus without complication, unspecified whether long term insulin use (HCC)    GERD (gastroesophageal reflux disease)    Mild intermittent asthma without complication (HCC)    Depression    Empty sella (HCC)    Diverticula of colon    Schatzki's ring    Gastritis    Hypogonadotropic hypogonadism (HCC)    Mixed hyperlipidemia    Spondylosis of lumbar region without myelopathy or radiculopathy    Murmur, cardiac    Carotid stenosis    Diastolic dysfunction    Chronic midline thoracic back pain    Essential hypertension    Acquired hypothyroidism    Seasonal allergies    Primary osteoarthritis of right knee    Neuropathy    Secondary hypothyroidism    Dyslipidemia associated with type 2 diabetes mellitus (HCC)    Major depressive disorder, recurrent, mild (HCC)    Type 2 diabetes mellitus with diabetic neuropathy (HCC)       Past Medical History:    Acquired hypothyroidism    Adjustment disorder    With mixed depression and anxiety- psychology dr trice mcadams 3.2017    Asthma (HCC)    Carotid stenosis    Depression    Diabetes (HCC)    Diastolic dysfunction    Diverticula of colon    of sigmoid colon    Empty sella (HCC)    a 1.2 cm mass is identified within th pituitary gland consistent with empty sella, a benign process    GERD (gastroesophageal reflux disease)    Hyperlipidemia    Hypogonadotropic hypogonadism (HCC)    Internal hemorrhoids    Low back pain    recurrent    Mixed hyperlipidemia    Mononucleosis    Murmur, cardiac    BELLA on CPAP    Schatzki's ring    Sleep apnea    Type 2 diabetes mellitus,  uncontrolled     Past Surgical History:   Procedure Laterality Date    Back surgery  01/01/1997    repair shattered L5 disc  no metal remains    Carpal tunnel release Right     Colonoscopy  01/01/2011    Colonoscopy  2011    Sinus surgery        deviated septum surg 2010    Trigger finger release      Upper gi endoscopy performed  03/25/2013    amelia's ring     Family History   Problem Relation Age of Onset    Diabetes Mother     Other (COPD) Mother     Asthma Mother     Depression Mother     Other (lung cancer) Father     Other (heroin use) Sister     Heart Attack Maternal Grandfather     Other (colon cancer) Other     Bipolar Disorder Daughter     Depression Daughter     Prostate Cancer Neg      Social History     Socioeconomic History    Marital status:    Occupational History    Occupation: [employer]     Employer: [employer]   Tobacco Use    Smoking status: Never     Passive exposure: Never    Smokeless tobacco: Never   Vaping Use    Vaping status: Never Used   Substance and Sexual Activity    Alcohol use: Not Currently    Drug use: No   Other Topics Concern    Caffeine Concern No    Stress Concern No    Weight Concern No    Special Diet Yes    Exercise No    Seat Belt Yes       Current Outpatient Medications   Medication Sig Dispense Refill    ergocalciferol 1.25 MG (76434 UT) Oral Cap Take 1 capsule (50,000 Units total) by mouth once a week. 12 capsule 0    atorvastatin 20 MG Oral Tab TAKE 1 TABLET BY MOUTH NIGHTLY 90 tablet 1    Continuous Glucose Sensor (FREESTYLE MIRTHA 3 PLUS SENSOR) Does not apply Misc 1 each by Other route every 15 (fifteen) days. 2 each 2    Canagliflozin (INVOKANA) 100 MG Oral Tab Take 100 mg by mouth daily. 90 tablet 0    metFORMIN HCl 1000 MG Oral Tab Take 1 tablet (1,000 mg total) by mouth 2 (two) times daily with meals. 180 tablet 0    OZEMPIC, 2 MG/DOSE, 8 MG/3ML Subcutaneous Solution Pen-injector Inject 2 mg into the skin once a week. 9 mL 0    ESCITALOPRAM 10  MG Oral Tab TAKE 1 TABLET BY MOUTH DAILY 90 tablet 3    losartan 50 MG Oral Tab TAKE 1 TABLET BY MOUTH DAILY 90 tablet 2    Blood Glucose Monitoring Suppl (ONETOUCH VERIO) w/Device Does not apply Kit 1 each daily. 1 kit 0    aspirin 81 MG Oral Tab EC Take 1 tablet (81 mg total) by mouth daily.      OMEPRAZOLE 20 MG Oral Capsule Delayed Release TAKE 1 CAPSULE BY MOUTH DAILY 90 capsule 3    albuterol (VENTOLIN HFA) 108 (90 Base) MCG/ACT Inhalation Aero Soln Inhale 2 puffs into the lungs every 4 (four) hours as needed for Wheezing or Shortness of Breath. 1 each 3    Insulin Pen Needle (PEN NEEDLES) 32G X 4 MM Does not apply Misc 1 each by Does not apply route every 7 days. 90 each 0       Allergies  Allergies   Allergen Reactions    Dust Mites ASTHMA    Pollen OTHER (SEE COMMENTS)    Ragweed OTHER (SEE COMMENTS)    Tree Extract OTHER (SEE COMMENTS)       Health Maintenance  Immunizations:  Immunization History   Administered Date(s) Administered    >=3 YRS FLUZONE OR FLUARIX QUAD PRESERVE FREE SINGLE DOSE (91467) FLU CLINIC 09/27/2016, 10/17/2017    >=3 YRS TRI  MULTIDOSE VIAL (67172) FLU CLINIC 11/17/2014    >=9 YRS AFLURIA TRI PRESERV FREE SINGLE DOSE (33269) FLU CLINIC 11/05/2015    Covid-19 Vaccine Pfizer 30 mcg/0.3 ml 02/16/2021, 03/09/2021, 11/23/2021    FLULAVAL 6 months & older 0.5 ml Prefilled syringe (49549) 09/24/2018, 09/30/2019, 10/24/2022    FLUZONE 6 months and older PFS 0.5 ml (89357) 10/19/2020, 10/12/2021, 10/16/2023    Influenza 01/01/2008, 01/01/2009, 09/22/2010, 09/19/2011, 01/08/2013, 08/16/2023    Influenza Vaccine, trivalent (IIV3), PF 0.5mL (48657) 10/28/2024    Pneumococcal Conjugate PCV20 08/28/2023    Pneumovax 23 12/03/2018    TDAP 06/10/2003, 06/09/2020    Zoster Vaccine Recombinant Adjuvanted (Shingrix) 08/05/2019, 02/03/2020         Physical Exam  /70   Pulse 97   Temp 97.6 °F (36.4 °C) (Temporal)   Resp 16   Ht 5' 11\" (1.803 m)   Wt 175 lb (79.4 kg)   SpO2 99%   BMI 24.41  kg/m²   Constitutional: Oriented to person, place, and time. No distress.   HEENT:  Normocephalic and atraumatic. Hearing and tympanic membranes normal.  Nose normal. Oropharynx is clear and moist.   Eyes: Conjunctivae and EOM are normal. PERRLA. No scleral icterus.   Neck:  No mass and no thyromegaly present.   Cardiovascular: Normal rate, regular rhythm and intact distal pulses. No murmur, rubs or gallops.   Pulmonary/Chest: Effort normal and breath sounds normal. No respiratory distress. No wheezes, rhonchi or rales.  Abdominal: Soft. Bowel sounds are normal. Non tender, no masses, no organomegaly or hernias.     A/P:   Encounter Diagnoses   Name Primary?    Type 2 diabetes mellitus without complication, without long-term current use of insulin (HCC)     Mixed hyperlipidemia     Mild intermittent asthma without complication (HCC)     BELLA on CPAP     Well adult exam Yes    Essential hypertension     Schatzki's ring     Gastroesophageal reflux disease without esophagitis      1. Type 2 diabetes mellitus without complication, without long-term current use of insulin (HCC)  Diabetes: A1c is 6.9 done 12/27/2024 which shows Excellent control. Continue current meds and lifestyle modification.  Diabetic Medications: DM Meds: Invokana Tabs - 100 MG; metFORMIN HCl Tabs - 1000 MG; Ozempic (2 MG/DOSE) Sopn - 8 MG/3ML  Well controlled  See ophtho  Managed by Endo   F/u 6 months       2. Mixed hyperlipidemia  Continue on statin   Following Dr. Eric     3. Mild intermittent asthma without complication (HCC)  Pt is on albuteron prn     4. BELLA on CPAP  Hx of BELLA on cpap    5. Well adult exam  - -Discussed diet and exercise, counseled on vaccine and screening guidelines.     6. Essential hypertension  BP shows good control with last BP of 110/70. Continue lifestyle changes, diet, exercise and weight loss.   12/27/2024: Potassium 4.0; Creatinine 0.83; eGFR-Cr 98  BP Meds: losartan Tabs - 50 MG        7. Schatzki's ring  Diagnosed  with endscopy 30 + years ago. Last endoscopy was in 2022- in care everywhere   He underwent dilations 20-30 years ago.   No symptoms now.     8. Gastroesophageal reflux disease without esophagitis  Controlled on omeprazole.   Pt follows GI.       Orders Placed This Encounter   Procedures    CMP in 6 months    Lipid in 6 months    Hemoglobin A1C in 6 months    Microalb/Creat Ratio, Random Urine in 6 months       Meds & Refills for this Visit:  Requested Prescriptions     Signed Prescriptions Disp Refills    ergocalciferol 1.25 MG (36149 UT) Oral Cap 12 capsule 0     Sig: Take 1 capsule (50,000 Units total) by mouth once a week.       Imaging & Consults:  None      No follow-ups on file.    There are no Patient Instructions on file for this visit.       Alen Henson,         This note was prepared using Dragon Medical voice recognition dictation software. As a result errors may occur. When identified these errors have been corrected. While every attempt is made to correct errors during dictation discrepancies may still exist.      Note to patient: The 21st Century Cures Act makes medical notes like these available to patients in the interest of transparency. However, be advised this is a medical document. It is intended as peer to peer communication. It is written in medical language and may contain abbreviations or verbiage that are unfamiliar. It may appear blunt or direct. Medical documents are intended to carry relevant information, facts as evident, and the clinical opinion of the practitioner.

## 2025-02-18 ENCOUNTER — TELEPHONE (OUTPATIENT)
Dept: FAMILY MEDICINE CLINIC | Facility: CLINIC | Age: 65
End: 2025-02-18

## 2025-02-18 ENCOUNTER — OFFICE VISIT (OUTPATIENT)
Dept: FAMILY MEDICINE CLINIC | Facility: CLINIC | Age: 65
End: 2025-02-18
Payer: COMMERCIAL

## 2025-02-18 VITALS
WEIGHT: 173 LBS | TEMPERATURE: 97 F | HEART RATE: 107 BPM | RESPIRATION RATE: 20 BRPM | DIASTOLIC BLOOD PRESSURE: 60 MMHG | SYSTOLIC BLOOD PRESSURE: 100 MMHG | HEIGHT: 71 IN | BODY MASS INDEX: 24.22 KG/M2 | OXYGEN SATURATION: 98 %

## 2025-02-18 DIAGNOSIS — K64.5 THROMBOSED HEMORRHOIDS: Primary | ICD-10-CM

## 2025-02-18 PROCEDURE — 3078F DIAST BP <80 MM HG: CPT | Performed by: FAMILY MEDICINE

## 2025-02-18 PROCEDURE — 3074F SYST BP LT 130 MM HG: CPT | Performed by: FAMILY MEDICINE

## 2025-02-18 PROCEDURE — 3008F BODY MASS INDEX DOCD: CPT | Performed by: FAMILY MEDICINE

## 2025-02-18 PROCEDURE — 99213 OFFICE O/P EST LOW 20 MIN: CPT | Performed by: FAMILY MEDICINE

## 2025-02-18 RX ORDER — DIBUCAINE 1% 1 G/100G
1 CREAM TOPICAL AS NEEDED
Qty: 28 G | Refills: 0 | Status: SHIPPED | OUTPATIENT
Start: 2025-02-18

## 2025-02-18 RX ORDER — SENNOSIDES A AND B 8.6 MG/1
1 TABLET, FILM COATED ORAL 2 TIMES DAILY
Qty: 30 TABLET | Refills: 0 | Status: SHIPPED | OUTPATIENT
Start: 2025-02-18 | End: 2025-03-05

## 2025-02-18 RX ORDER — HYDROCORTISONE 25 MG/G
CREAM TOPICAL
Qty: 28 G | Refills: 0 | Status: SHIPPED | OUTPATIENT
Start: 2025-02-18

## 2025-02-18 NOTE — TELEPHONE ENCOUNTER
Spoke to patient.   Has had hemorrhoid for past 4 days.   Was applying hemorrhoid cream, soaking 2x day.   Yesterday had bowel movement and hemorrhoid ruptured.  States it is not bleeding a lot but has had consistent little drops of blood since yesterday. Blood is dark red.   Scheduled appointment for 1 pm today.   Advised to apply cold compress and keep monitor bleeding. Go to ER if he has heavy bleeding, fever/chills.   Patient verbalized understanding.     Future Appointments   Date Time Provider Department Center   2/18/2025  1:00 PM Alen Henson DO [facility] [address]   3/25/2025  9:30 AM Francine Dorantes DO [id number] [place]   6/30/2025  9:00 AM Alen Henson DO [facility] [address] [facility] [address]

## 2025-02-18 NOTE — PROGRESS NOTES
Subjective:   Zeeshan Yang is a 64 year old male who presents for Hemorrhoids (Patient c/o bursted hemorrhoids and had one more )        History/Other:    Chief Complaint Reviewed and Verified  Nursing Notes Reviewed and   Verified  Tobacco Reviewed  Allergies Reviewed  Medications Reviewed    Problem List Reviewed  Medical History Reviewed  Surgical History   Reviewed  Family History Reviewed  Social History Reviewed         GI Bleeding  Patient complains of bright red blood per rectum. Pt has had bright red bleeding for about 3 days. He felt a hemorrhoid, had pain with BM, thinks that the hemorrhoid burst and felt relief, now he feels a second hemmorhoid. He has been constipated last couple weeks. Denies fevers, chills, night sweats, abdominal pain, pelvic pain, urinary issue. He is taking miralax which has softened his stool. Notes bleeding when wiping. Denies tarry tool.   There is fmhx of 3 uncles with colon Ca. He is utd with his colonoscopy.   Colonoscopy in April 2022  Pain right now in the rectum is 2/10.   Tobacco:  He has never smoked tobacco.    Current Outpatient Medications   Medication Sig Dispense Refill    sennosides 8.6 MG Oral Tab Take 1 tablet (8.6 mg total) by mouth 2 (two) times daily for 15 days. 30 tablet 0    hydrocortisone 2.5 % External Cream Apply sparingly as needed up to two times per day 28 g 0    dibucaine 1 % External Ointment Apply 1 Application topically as needed for Pain (up to two times per day). 28 g 0    ergocalciferol 1.25 MG (00153 UT) Oral Cap Take 1 capsule (50,000 Units total) by mouth once a week. 12 capsule 0    atorvastatin 20 MG Oral Tab TAKE 1 TABLET BY MOUTH NIGHTLY 90 tablet 1    Continuous Glucose Sensor (FREESTYLE MIRTHA 3 PLUS SENSOR) Does not apply Misc 1 each by Other route every 15 (fifteen) days. 2 each 2    Canagliflozin (INVOKANA) 100 MG Oral Tab Take 100 mg by mouth daily. 90 tablet 0    metFORMIN HCl 1000 MG Oral Tab Take 1 tablet (1,000 mg  total) by mouth 2 (two) times daily with meals. 180 tablet 0    OZEMPIC, 2 MG/DOSE, 8 MG/3ML Subcutaneous Solution Pen-injector Inject 2 mg into the skin once a week. 9 mL 0    ESCITALOPRAM 10 MG Oral Tab TAKE 1 TABLET BY MOUTH DAILY 90 tablet 3    losartan 50 MG Oral Tab TAKE 1 TABLET BY MOUTH DAILY 90 tablet 2    Blood Glucose Monitoring Suppl (ONETOUCH VERIO) w/Device Does not apply Kit 1 each daily. 1 kit 0    aspirin 81 MG Oral Tab EC Take 1 tablet (81 mg total) by mouth daily.      OMEPRAZOLE 20 MG Oral Capsule Delayed Release TAKE 1 CAPSULE BY MOUTH DAILY 90 capsule 3    albuterol (VENTOLIN HFA) 108 (90 Base) MCG/ACT Inhalation Aero Soln Inhale 2 puffs into the lungs every 4 (four) hours as needed for Wheezing or Shortness of Breath. 1 each 3    Insulin Pen Needle (PEN NEEDLES) 32G X 4 MM Does not apply Misc 1 each by Does not apply route every 7 days. 90 each 0         Review of Systems:  Review of Systems   Constitutional:  Negative for appetite change, chills, fatigue and fever.   Cardiovascular: Negative.    Gastrointestinal:  Positive for anal bleeding, blood in stool, constipation and rectal pain. Negative for abdominal pain, diarrhea, nausea and vomiting.         Objective:   /60   Pulse 107   Temp 97 °F (36.1 °C) (Temporal)   Resp 20   Ht 5' 11\" (1.803 m)   Wt 173 lb (78.5 kg)   SpO2 98%   BMI 24.13 kg/m²  Estimated body mass index is 24.13 kg/m² as calculated from the following:    Height as of this encounter: 5' 11\" (1.803 m).    Weight as of this encounter: 173 lb (78.5 kg).  Physical Exam  Constitutional:       General: He is not in acute distress.     Appearance: Normal appearance. He is not ill-appearing.   Cardiovascular:      Rate and Rhythm: Normal rate and regular rhythm.   Pulmonary:      Effort: Pulmonary effort is normal. No respiratory distress.      Breath sounds: Normal breath sounds.   Abdominal:      General: There is no distension.      Palpations: There is no mass.       Tenderness: There is no abdominal tenderness.   Genitourinary:     Rectum: Guaiac result positive. External hemorrhoid present. No mass or anal fissure.   Neurological:      Mental Status: He is alert.            Assessment & Plan:   1. Thrombosed hemorrhoids (Primary)  -     Surgery Referral - [facility] ([facility]  -     [facility]; Take 1 tablet (8.6 mg total) by mouth 2 (two) times daily for 15 days.  Dispense: 30 tablet; Refill: 0  -     Hydrocortisone (Perianal); Apply sparingly as needed up to two times per day  Dispense: 28 g; Refill: 0  -     Dibucaine; Apply 1 Application topically as needed for Pain (up to two times per day).  Dispense: 28 g; Refill: 0  BRBPR likely due to external hemmorhoid that is tender, bleeding, appears thrombosed.  recommended conservative treatment with medications as above, and surgery referral given to have this treated. Pt to call office if he needs assistance with scheduling appointment. ED if pain or bleeding worsen.         No follow-ups on file.    Alen Henson DO, 2/18/2025, 1:17 PM

## 2025-02-20 ENCOUNTER — TELEPHONE (OUTPATIENT)
Dept: FAMILY MEDICINE CLINIC | Facility: CLINIC | Age: 65
End: 2025-02-20

## 2025-02-21 NOTE — TELEPHONE ENCOUNTER
Patient called, asking about forms process, stated he thought his doctor will fill out forms. Explained forms department to patient. Obtained details. Forms are due 3/6. Logged for processing,  Type of Leave: disability  Reason for Leave: Thrombosed hemorrhoids   Start date of leave: 2/18  End date of leave: patient unaware, will task DO    Patient has visit with surgeon, not with [facility], on 2/27. Will task DO for leave until 3/2, giving patient time to sort out possible surgery date. Asked patient to call us back once he has completed his 2/27 appointment, to inform us of possible extension on leave (patient might have surgery, treatment is not determined at this time).

## 2025-02-21 NOTE — TELEPHONE ENCOUNTER
Beverly Henson,    This patient is requesting disability for Thrombosed hemorrhoids that he recently discussed at his visit with you. He states he tried going to work, but since one of them burst, he was in too much pain to continue working.    He is requesting disability starting 2/18, his first day out of work. He is unaware of the treatment plan at this time, states that with [facility] the surgery would have been a couple months out, so he is getting a second opinion outside of [facility] on 2/27 where he might get more information on a sooner procedure.    Discussed possible return to work date with him, and asked him to call us back once he has had his 2/27 appointment to see what the process will be. Due to this, he is requesting his return to work date to be 3/3, pending the 2/27 information.    He will call us after the appointment to discuss further need for disability.    Do you support disability from 2/18 to 3/2?    Thank you and sorry for the long message.  -Kareen, [facility]

## 2025-02-25 NOTE — TELEPHONE ENCOUNTER
Patient called to give an update he is seeing the surgeon this Friday 2/28 and they told him he would not be ready to go back on Monday 3/3 he would like to extend it to 3/10/25. Patient stated he would call back if anything changes from his Apt on Friday.

## 2025-02-28 NOTE — TELEPHONE ENCOUNTER
Patient called w/ update, was released to Return to work 3/03/25 w/ no restrictions.  [employer] sent for Release of Information.  Patient states form due 3/06/25.  Rep informed.

## 2025-02-28 NOTE — TELEPHONE ENCOUNTER
Patient called to say he didn't have any information on 3rd party company, advised patient the name and fax number was included in the request for Release of Information, patient said ok he will do the form.

## 2025-02-28 NOTE — TELEPHONE ENCOUNTER
Patient called to make sure the Release of Information was correct. I advised that it was and the form was completed and signed so I would fax it out for him. E-faxed to [employer] [phone].  Forms sent to patient via [employer].

## 2025-02-28 NOTE — TELEPHONE ENCOUNTER
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 Start date 02/18/25-03/02/25 Return to work 03/03/25    -Signature page will be the first page scanned  -From your Inbasket, Highlight the patient and click Chart   -Double click the 02/20/2025 Forms Completion telephone encounter  -Scroll down to the Media section   -Click the blue Hyperlink: Family Medical Leave Act Dr Henson 02/28/25  -Click Acknowledge located in the top right ribbon/menu   -Drag the mouse into the blank space of the document and a + sign will appear. Left click to   electronically sign the document.  -Once signed, simply exit out of the screen and you signature will be saved.     Thank you,    Jacquelin

## 2025-03-08 DIAGNOSIS — K29.50 CHRONIC GASTRITIS, PRESENCE OF BLEEDING UNSPECIFIED, UNSPECIFIED GASTRITIS TYPE: ICD-10-CM

## 2025-03-10 RX ORDER — OMEPRAZOLE 20 MG/1
20 CAPSULE, DELAYED RELEASE ORAL DAILY
Qty: 90 CAPSULE | Refills: 3 | Status: SHIPPED | OUTPATIENT
Start: 2025-03-10

## 2025-03-10 NOTE — TELEPHONE ENCOUNTER
Requested Prescriptions     Pending Prescriptions Disp Refills    OMEPRAZOLE 20 MG Oral Capsule Delayed Release [Pharmacy Med Name: Omeprazole 20 MG Oral Capsule Delayed Release] 90 capsule 3     Sig: TAKE 1 CAPSULE BY MOUTH DAILY       LOV: [id number]  RTC: 6m  Last Relevant Labs: [id number]  Filled: [id number] #90 with 0 refills    Future Appointments   Date Time Provider Department Center   3/25/2025  9:30 AM Francine Dorantes DO [id number] [facility] [place]   6/30/2025  9:00 AM Alen Henson DO [facility] [address] [facility] [address]

## 2025-03-19 DIAGNOSIS — E11.40 TYPE 2 DIABETES MELLITUS WITH DIABETIC NEUROPATHY, WITHOUT LONG-TERM CURRENT USE OF INSULIN (HCC): ICD-10-CM

## 2025-03-20 ENCOUNTER — APPOINTMENT (OUTPATIENT)
Dept: GENERAL RADIOLOGY | Age: 65
End: 2025-03-20
Payer: COMMERCIAL

## 2025-03-20 ENCOUNTER — HOSPITAL ENCOUNTER (EMERGENCY)
Age: 65
Discharge: HOME | End: 2025-03-20
Attending: EMERGENCY MEDICINE
Payer: COMMERCIAL

## 2025-03-20 VITALS
RESPIRATION RATE: 17 BRPM | DIASTOLIC BLOOD PRESSURE: 70 MMHG | OXYGEN SATURATION: 98 % | WEIGHT: 174 LBS | BODY MASS INDEX: 24.36 KG/M2 | SYSTOLIC BLOOD PRESSURE: 129 MMHG | HEART RATE: 86 BPM | HEIGHT: 71 IN | TEMPERATURE: 98 F

## 2025-03-20 DIAGNOSIS — R07.9 CHEST PAIN OF UNCERTAIN ETIOLOGY: Primary | ICD-10-CM

## 2025-03-20 LAB
ALBUMIN SERPL-MCNC: 5 G/DL (ref 3.2–4.8)
ALBUMIN/GLOB SERPL: 2.4 {RATIO} (ref 1–2)
ALP LIVER SERPL-CCNC: 87 U/L
ALT SERPL-CCNC: 10 U/L
ANION GAP SERPL CALC-SCNC: 8 MMOL/L (ref 0–18)
AST SERPL-CCNC: 12 U/L (ref ?–34)
ATRIAL RATE: 74 BPM
ATRIAL RATE: 76 BPM
BASOPHILS # BLD AUTO: 0.03 X10(3) UL (ref 0–0.2)
BASOPHILS NFR BLD AUTO: 0.6 %
BILIRUB SERPL-MCNC: 0.5 MG/DL (ref 0.2–1.1)
BUN BLD-MCNC: 13 MG/DL (ref 9–23)
CALCIUM BLD-MCNC: 10 MG/DL (ref 8.7–10.6)
CHLORIDE SERPL-SCNC: 104 MMOL/L (ref 98–112)
CO2 SERPL-SCNC: 26 MMOL/L (ref 21–32)
CREAT BLD-MCNC: 0.83 MG/DL
D DIMER PPP FEU-MCNC: <0.27 UG/ML FEU (ref ?–0.63)
EGFRCR SERPLBLD CKD-EPI 2021: 98 ML/MIN/1.73M2 (ref 60–?)
EOSINOPHIL # BLD AUTO: 0.12 X10(3) UL (ref 0–0.7)
EOSINOPHIL NFR BLD AUTO: 2.2 %
ERYTHROCYTE [DISTWIDTH] IN BLOOD BY AUTOMATED COUNT: 14.8 %
GLOBULIN PLAS-MCNC: 2.1 G/DL (ref 2–3.5)
GLUCOSE BLD-MCNC: 169 MG/DL (ref 70–99)
HCT VFR BLD AUTO: 40.9 %
HGB BLD-MCNC: 13.3 G/DL
IMM GRANULOCYTES # BLD AUTO: 0.02 X10(3) UL (ref 0–1)
IMM GRANULOCYTES NFR BLD: 0.4 %
LYMPHOCYTES # BLD AUTO: 1.41 X10(3) UL (ref 1–4)
LYMPHOCYTES NFR BLD AUTO: 26.1 %
MCH RBC QN AUTO: 27.5 PG (ref 26–34)
MCHC RBC AUTO-ENTMCNC: 32.5 G/DL (ref 31–37)
MCV RBC AUTO: 84.7 FL
MONOCYTES # BLD AUTO: 0.47 X10(3) UL (ref 0.1–1)
MONOCYTES NFR BLD AUTO: 8.7 %
NEUTROPHILS # BLD AUTO: 3.36 X10 (3) UL (ref 1.5–7.7)
NEUTROPHILS # BLD AUTO: 3.36 X10(3) UL (ref 1.5–7.7)
NEUTROPHILS NFR BLD AUTO: 62 %
OSMOLALITY SERPL CALC.SUM OF ELEC: 290 MOSM/KG (ref 275–295)
P AXIS: 42 DEGREES
P AXIS: 43 DEGREES
P-R INTERVAL: 184 MS
P-R INTERVAL: 198 MS
PLATELET # BLD AUTO: 174 10(3)UL (ref 150–450)
POCT INFLUENZA A: NEGATIVE
POCT INFLUENZA B: NEGATIVE
POTASSIUM SERPL-SCNC: 3.8 MMOL/L (ref 3.5–5.1)
PROT SERPL-MCNC: 7.1 G/DL (ref 5.7–8.2)
Q-T INTERVAL: 346 MS
Q-T INTERVAL: 350 MS
QRS DURATION: 76 MS
QRS DURATION: 82 MS
QTC CALCULATION (BEZET): 388 MS
QTC CALCULATION (BEZET): 389 MS
R AXIS: -5 DEGREES
R AXIS: 6 DEGREES
RBC # BLD AUTO: 4.83 X10(6)UL
SARS-COV-2 RNA RESP QL NAA+PROBE: NOT DETECTED
SODIUM SERPL-SCNC: 138 MMOL/L (ref 136–145)
T AXIS: 36 DEGREES
T AXIS: 63 DEGREES
TROPONIN I SERPL HS-MCNC: <3 NG/L
TROPONIN I SERPL HS-MCNC: <3 NG/L
VENTRICULAR RATE: 74 BPM
VENTRICULAR RATE: 76 BPM
WBC # BLD AUTO: 5.4 X10(3) UL (ref 4–11)

## 2025-03-20 PROCEDURE — 93010 ELECTROCARDIOGRAM REPORT: CPT

## 2025-03-20 PROCEDURE — 99285 EMERGENCY DEPT VISIT HI MDM: CPT

## 2025-03-20 PROCEDURE — 87502 INFLUENZA DNA AMP PROBE: CPT | Performed by: EMERGENCY MEDICINE

## 2025-03-20 PROCEDURE — 96374 THER/PROPH/DIAG INJ IV PUSH: CPT

## 2025-03-20 PROCEDURE — 93005 ELECTROCARDIOGRAM TRACING: CPT

## 2025-03-20 PROCEDURE — 85379 FIBRIN DEGRADATION QUANT: CPT | Performed by: EMERGENCY MEDICINE

## 2025-03-20 PROCEDURE — 87502 INFLUENZA DNA AMP PROBE: CPT

## 2025-03-20 PROCEDURE — 85025 COMPLETE CBC W/AUTO DIFF WBC: CPT | Performed by: EMERGENCY MEDICINE

## 2025-03-20 PROCEDURE — 84484 ASSAY OF TROPONIN QUANT: CPT

## 2025-03-20 PROCEDURE — 84484 ASSAY OF TROPONIN QUANT: CPT | Performed by: EMERGENCY MEDICINE

## 2025-03-20 PROCEDURE — 85025 COMPLETE CBC W/AUTO DIFF WBC: CPT

## 2025-03-20 PROCEDURE — 80053 COMPREHEN METABOLIC PANEL: CPT | Performed by: EMERGENCY MEDICINE

## 2025-03-20 PROCEDURE — 80053 COMPREHEN METABOLIC PANEL: CPT

## 2025-03-20 PROCEDURE — 71045 X-RAY EXAM CHEST 1 VIEW: CPT | Performed by: EMERGENCY MEDICINE

## 2025-03-20 RX ORDER — SEMAGLUTIDE 2.68 MG/ML
2 INJECTION, SOLUTION SUBCUTANEOUS WEEKLY
Qty: 9 ML | Refills: 0 | Status: SHIPPED | OUTPATIENT
Start: 2025-03-20

## 2025-03-20 RX ORDER — ASPIRIN 81 MG/1
324 TABLET, CHEWABLE ORAL ONCE
Status: COMPLETED | OUTPATIENT
Start: 2025-03-20 | End: 2025-03-20

## 2025-03-20 RX ORDER — KETOROLAC TROMETHAMINE 15 MG/ML
15 INJECTION, SOLUTION INTRAMUSCULAR; INTRAVENOUS ONCE
Status: COMPLETED | OUTPATIENT
Start: 2025-03-20 | End: 2025-03-20

## 2025-03-20 NOTE — ED PROVIDER NOTES
Patient Seen in: [facility] [place]      History     Chief Complaint   Patient presents with    Chest Pain Angina     Stated Complaint: chest pain at work    Subjective:   HPI      This is a 64-year-old male who has a history of type 2 diabetes, hypercholesterolemia presents with chest pain he describes the left side of his chest.  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  No associated shortness of breath no fevers.  The pain is definitely worse when he takes a deep breath.  But is there constantly.  But it is definitely worse with deep breath.  He has had no fevers no chills.  He does have a history of asthma he does use inhaler.  He did have a stress test in July 2024 which showed.  Which was a normal stress test.       IMPRESSION:  Normal exercise ECG.  Denies any history of premature heart disease in the family denies any long trips denies any calf pain.    Denies any history of blood clots.  Denies any previous heart attack.  Objective:     Past Medical History:    Acquired hypothyroidism    Adjustment disorder    With mixed depression and anxiety- psychology dr trice mcadams 3.2017    Asthma (HCC)    Carotid stenosis    Depression    Diabetes (HCC)    Diastolic dysfunction    Diverticula of colon    of sigmoid colon    Empty sella (HCC)    a 1.2 cm mass is identified within th pituitary gland consistent with empty sella, a benign process    GERD (gastroesophageal reflux disease)    Hyperlipidemia    Hypogonadotropic hypogonadism (HCC)    Internal hemorrhoids    Low back pain    recurrent    Mixed hyperlipidemia    Mononucleosis    Murmur, cardiac    BELLA on CPAP    Schatzki's ring    Sleep apnea    Type 2 diabetes mellitus, uncontrolled              Past Surgical History:   Procedure Laterality Date    Back surgery   01/01/1997    repair shattered L5 disc  no metal remains    Carpal tunnel release Right     Colonoscopy  01/01/2011    Colonoscopy  2011    Sinus surgery        deviated septum surg 2010    Trigger finger release      Upper gi endoscopy performed  03/25/2013    schatzki's ring                Social History     Socioeconomic History    Marital status:    Occupational History    Occupation:      Employer: [employer]   Tobacco Use    Smoking status: Never     Passive exposure: Never    Smokeless tobacco: Never   Vaping Use    Vaping status: Never Used   Substance and Sexual Activity    Alcohol use: Not Currently    Drug use: No   Other Topics Concern    Caffeine Concern No    Stress Concern No    Weight Concern No    Special Diet Yes    Exercise No    Seat Belt Yes                  Physical Exam     ED Triage Vitals [03/20/25 0600]   /71   Pulse 77   Resp 20   Temp 97.7 °F (36.5 °C)   Temp src Oral   SpO2 98 %   O2 Device None (Room air)       Current Vitals:   Vital Signs  BP: 129/70  Pulse: 86  Resp: 17  Temp: 97.7 °F (36.5 °C)  Temp src: Oral    Oxygen Therapy  SpO2: 98 %  O2 Device: None (Room air)        Physical Exam  General: .  Male no respiratory distress.      HEENT: Atraumatic, conjunctiva are not pale.  There is no icterus.  Oral mucosa Is wet.  No facial trauma.  The neck is supple.    LUNGS: Clear to auscultation, there is no wheezing or retraction.  No crackles.    CV: Cardiovascular is regular without murmurs or rubs.  Pain is somewhat reproducible on his left side of his chest wall on palpation.  ABD: The abdomen is soft nondistended nontender.  There is no rebound.  There is no guarding.    EXT: There is good pulses bilaterally.  There is no calf tenderness.  There is no rash noted.  There is no edema    NEURO: Alert and oriented x4.  Muscle strength and sensory exam is grossly normal.  And the patient is neurologically intact with no focal findings.      ED Course      Labs Reviewed   COMP METABOLIC PANEL (14) - Abnormal; Notable for the following components:       Result Value    Glucose 169 (*)     Albumin 5.0 (*)     A/G Ratio 2.4 (*)     All other components within normal limits   TROPONIN I HIGH SENSITIVITY - Normal   D-DIMER - Normal   TROPONIN I HIGH SENSITIVITY - Normal   RAPID SARS-COV-2 BY PCR - Normal   POCT FLU TEST - Normal    Narrative:     This assay is a rapid molecular in vitro test utilizing nucleic acid amplification of influenza A and B viral RNA.   CBC WITH DIFFERENTIAL WITH PLATELET   RAINBOW DRAW LAVENDER   RAINBOW DRAW LIGHT GREEN   RAINBOW DRAW BLUE             The patient was placed on monitors, IV was started, blood was drawn.    Workup was done to rule out an acute coronary syndrome, PE was considered although less likely.  Other pleuritic in nature.  Pneumonia pneumothorax was considered.       MDM      The EKG shows normal sinus rhythm.  There is no acute ST elevations or ischemic findings.  The rest of the EKG including rate rhythm axis and intervals I agree with the EKG report . The rate is 76.  There is some poor IV progression no acute ST elevation.    When compared to an EKG from 2020 to December.  No significant changes.  QRS duration is 82  The patient's D-dimer was negative troponin was negative flu, COVID, was negative CBC, comprehensive was grossly negative.    The patient was given aspirin, Toradol    I personally reviewed the radiographs and my individual interpretation shows  No obvious pneumonia, pneumothorax      Also reviewed official report and it shows  XR CHEST AP PORTABLE  (CPT=71045)    Result Date: 3/20/2025  PROCEDURE:  XR CHEST AP PORTABLE  (CPT=71045)  TECHNIQUE:  AP chest radiograph was obtained.  COMPARISON:  [facility], XR, XR CHEST PA + LAT CHEST (CPT=71046), 12/13/2022, 10:59 AM.  INDICATIONS:  chest pain at work, difficulty taking a deep breath  PATIENT STATED HISTORY: (As transcribed by Technologist)    chest pain  at work, difficulty taking a deep breath    FINDINGS:  Normal heart size and pulmonary vascularity. No pleural effusion or pneumothorax. No lobar consolidation.            CONCLUSION:  No active cardiopulmonary process identified.   LOCATION:  [facility]      Dictated by (CST): Stromberg, LeRoy, MD on 3/20/2025 at 6:24 AM     Finalized by (CST): Stromberg, LeRoy, MD on 3/20/2025 at 6:26 AM          The patient's troponin, D-dimer troponin x 2 is negative.  The patient was pain-free after aspirin, Toradol.    I did see the patient's stress thallium from October 2024.    Impressions:     - Normal nuclear perfusion study.   - There is no evidence of myocardial ischemia.   - There is no evidence of myocardial infarction.   - Normal regional wall thickening is noted on gated analysis.   - Normal ejection fraction.     Discussed this case with Dr. Bry Moncada.  He felt that the patient already had a stress thallium within the last several months.  And felt that a stress echo would not be of benefit in this patient.  He felt the patient can follow-up in their office.  At the story did not sound cardiac.  His pain was reproducible he has had normal troponins.  He is pain-free at this present time he was working on a robot at work and he might of strained his chest wall.  As it was reproducible.  His pain-free right at this point I discussed options including admission he felt comfortable going home.  I discussed if he is going to go home I would closely follow-up with cardiology.  He can take an aspirin daily I recommended he does follow-up for an outpatient further workup he may need even a CT angiogram.  But I discussed with him if the pain recurs he is severe chest pain or shortness of breath return here.    He felt comfortable going home.    I recommend he take an aspirin daily.    I have also recommend close follow-up with his cardiologist.    I discussed with the patient that were seeing them  in a short period of time.  I  discussed with them that there is always a possibility that things can change and a need reevaluation with their primary care physician as soon as possible.  I've also discussed with them that if the pain gets worse to return to the emergency room immediately      Repeat EKG showed.  The EKG shows normal sinus rhythm.  There is no acute ST elevations or ischemic findings.  The rest of the EKG including rate rhythm axis and intervals I agree with the EKG report . The rate is 74 there is no acute ST elevation nonspecific ST changes.  I do not agree with the report there is an anterior infarct.    I did go back and reexamined him he has no chest pain or shortness of breath there is no reproducible chest wall pain.  I did feel that this pain was chest wall pain but I discussed there could be underlying coronary disease he felt comfortable going home and did not want to be admitted I discussed if he is good to go home recommend take an aspirin daily follow-up with cardiology.  Return if any any other complaints of recommend close follow-up with his primary care physician for outpatient possible close follow-up.        Medical Decision Making      Disposition and Plan     Clinical Impression:  1. Chest pain of uncertain etiology         Disposition:  Discharge  3/20/2025  9:16 am    Follow-up:  Alen Henson DO  [address]  [address]  [facility] [id number] [phone]    Follow up in 2 day(s)      Bry Moncada MD  [address]   [address]  [facility] [address] [phone]    Follow up in 2 day(s)            Medications Prescribed:  Current Discharge Medication List              Supplementary Documentation:

## 2025-03-20 NOTE — TELEPHONE ENCOUNTER
Endocrine Refill protocol for oral and injectable diabetic medications    Protocol Criteria:  PASSED  Reason: N/A    If all below requirements are met, send a 90-day supply with 1 refill per provider protocol.    Verify appointment with Endocrinology completed in the last 6 months or scheduled in the next 3 months.  Verify A1C has been completed within the last 6 months and is below 8.5%     Last completed office visit: 12/24/2024 Candice Toney APRN   Next scheduled Follow up:   Future Appointments   Date Time Provider Department Center   3/25/2025  9:30 AM Francine Dorantes DO [id number]   6/30/2025  9:00 AM Alen Henson DO [facility] [address]      Last A1c result: Last A1c value was 6.9% done 12/27/2024.

## 2025-03-20 NOTE — DISCHARGE INSTRUCTIONS
Take Motrin, Tylenol follow-up with your primary care physician for any severe chest pain or shortness of breath return to the emergency room.    You were seen in the emergency room in a limited time.  There is a possibility that although we do not see any acute process at this present time that things can change with time.  Is therefore imperative that you follow-up with primary care physician for close follow-up.  If there is any significant progression of your pain  or other symptoms you to return immediately to the emergency room.        Take 1 baby aspirin daily.

## 2025-03-20 NOTE — ED INITIAL ASSESSMENT (HPI)
Patient to ER with c/o left sided chest pain since 0300 with SOB/difficulty taking \"deep breaths\" and throat pain while working. No pain medications taken PTA.

## 2025-03-25 ENCOUNTER — OFFICE VISIT (OUTPATIENT)
Facility: CLINIC | Age: 65
End: 2025-03-25
Payer: COMMERCIAL

## 2025-03-25 VITALS
SYSTOLIC BLOOD PRESSURE: 130 MMHG | WEIGHT: 174 LBS | OXYGEN SATURATION: 97 % | HEIGHT: 71 IN | HEART RATE: 86 BPM | BODY MASS INDEX: 24.36 KG/M2 | DIASTOLIC BLOOD PRESSURE: 58 MMHG

## 2025-03-25 DIAGNOSIS — E11.40 TYPE 2 DIABETES MELLITUS WITH DIABETIC NEUROPATHY, WITHOUT LONG-TERM CURRENT USE OF INSULIN (HCC): Primary | ICD-10-CM

## 2025-03-25 DIAGNOSIS — E55.9 VITAMIN D DEFICIENCY: ICD-10-CM

## 2025-03-25 DIAGNOSIS — E03.8 SECONDARY HYPOTHYROIDISM: ICD-10-CM

## 2025-03-25 LAB — HEMOGLOBIN A1C: 7.1 % (ref 4.3–5.6)

## 2025-03-25 PROCEDURE — 99214 OFFICE O/P EST MOD 30 MIN: CPT | Performed by: STUDENT IN AN ORGANIZED HEALTH CARE EDUCATION/TRAINING PROGRAM

## 2025-03-25 PROCEDURE — 3051F HG A1C>EQUAL 7.0%<8.0%: CPT | Performed by: STUDENT IN AN ORGANIZED HEALTH CARE EDUCATION/TRAINING PROGRAM

## 2025-03-25 PROCEDURE — 83036 HEMOGLOBIN GLYCOSYLATED A1C: CPT | Performed by: STUDENT IN AN ORGANIZED HEALTH CARE EDUCATION/TRAINING PROGRAM

## 2025-03-25 PROCEDURE — 3008F BODY MASS INDEX DOCD: CPT | Performed by: STUDENT IN AN ORGANIZED HEALTH CARE EDUCATION/TRAINING PROGRAM

## 2025-03-25 PROCEDURE — 3075F SYST BP GE 130 - 139MM HG: CPT | Performed by: STUDENT IN AN ORGANIZED HEALTH CARE EDUCATION/TRAINING PROGRAM

## 2025-03-25 PROCEDURE — 3078F DIAST BP <80 MM HG: CPT | Performed by: STUDENT IN AN ORGANIZED HEALTH CARE EDUCATION/TRAINING PROGRAM

## 2025-03-25 PROCEDURE — 95249 CONT GLUC MNTR PT PROV EQP: CPT | Performed by: STUDENT IN AN ORGANIZED HEALTH CARE EDUCATION/TRAINING PROGRAM

## 2025-03-25 RX ORDER — CANAGLIFLOZIN 100 MG/1
100 TABLET, FILM COATED ORAL DAILY
Qty: 90 TABLET | Refills: 0 | Status: SHIPPED | OUTPATIENT
Start: 2025-03-25

## 2025-03-25 RX ORDER — SEMAGLUTIDE 2.68 MG/ML
2 INJECTION, SOLUTION SUBCUTANEOUS WEEKLY
Qty: 9 ML | Refills: 0 | Status: SHIPPED | OUTPATIENT
Start: 2025-03-25

## 2025-03-25 NOTE — PROGRESS NOTES
[facility] Note    Name: Zeeshan Yang    Date: 24    Zeeshan Yang is a 65 year old male who presents for evaluation of T2DM management.     Chief complaint: Diabetes (T2DM Followup, Jonathan sent to Provider for review , no concerns /Last A1c 6.9 2024, A1c done in office 7.1 /Eye Exam 2024- due/Foot Exam 2024/)       Subjective:   ##DM hx:  -Diagnosed with diabetes in   -Family history- yes, strong diabetes family hx      Initial HPI consult in 2024 with Dr. Dorantes  Hx of hypogonadotropic hypogonadism, HLD, HTN, hypothyroidism, BELLA on CPAP.  Patient was previously following with Dr. Christel Nielson, last visit 3/2024.  DM meds at first office visit: Metformin 1000 mg BID, Invokana 100 mg daily, Ozempic 2 mg weekly     Blood Glucose log reviewed. Checking 3-4 times per day. Av. Morning/fastin-140, pre-meal: 125-170, episodes of hypoglycemia: No     -Re: potential DM medication contraindication (if positive, checkbox selected):  [] History of pancreatitis  [] Personal/fam hx of medullary thyroid cancer/MEN2  [] History of recent/frequent UTI/yeast infxn  [] Previous amputation related to diabetes    -Presence of associated DM complications (if positive, checkbox selected):  [] Macrovascular complications (CAD/CVA/PAD)  [x] Neuropathy (hx of carpal tunnel and sciatica)  [] Retinopathy  [] Nephropathy  [x] HTN  [x] Hyperlipidemia  [] Stroke/TIA  [] Gastroparesis    -Lifestyle: does note dietary indiscretions; wife bakes a lot   - Modifying factors: works nightshift   120-135 when sleeping overnight  105-119 when sleeping during the day    Previously trialed/failed DM meds: Januvia in     ##Has a hx of hypogonadotropic hypogonadism and central hypothyroidism. An MRI of the sella was performed 2013 1.2 cm abnormality c/w empty sella and an MRI of the sella was performed 2011 1.2 cm abnormality c/w empty sella.  Previously tried Tirosint and testosterone  replacement therapy.  Patient had side effects with both and these were ultimately discontinued.  Had discussion regarding indications with previous endocrinologist and chose to remain off of testosterone and thyroid medication.  Denies any symptoms of hypothyroidism or low testosterone at this time.    INTERIM HX with MATI Harvey 12/24/24 with MATI Harvey :    Denies any concerns, not in any testosterone treatment at this time. States jonathan is very helpful   Current treatment:  Metformin 1000 mg BID, Invokana 100 mg daily, Ozempic 2 mg weekly    Testing: Continuous Glucose Monitoring Interpretation  Zeeshan Yang  has undergone continuous glucose monitoring with the [employer] Jonathan 3 CGM with phone (connected to clinic profile).  The blood glucose tracings were evaluated for two weeks prior to office visit. Blood glucose tracings demonstrated no significant hyperglycemia. There were no significant hypoglycemia notedduring the weeks of evaluation.      Date: 12/10-12/23/24: TIR 93%, GMI 6.5% ,  low 0% , very low 0%, SD 22mg/dl, Sensor usage: 93%  Hypoglycemia: denies   Complication:  no hospitalization and no ER visit since last office visit.   States with his hx of sleep apnea, he uses cpap daily   Denies falls in the last 12 months, no hx of fracture in his lifetime.   States he has neuropathy /numbness to left foot. He had sciatica and got injection few years ago and he the neuropathy occurred after he had that injection. He states feeling like there is a tourniquet to his left foot, states its tolerable, no pain, but he thinks its not related to his DM.       Interval Hx 03/25/25  Eating 8-10pm since he works 7p-7a; does have coffee with creamer around 730a. Will sleep 9-3p and will eat dinner around 430p (Tu/W/Th and W/Th/F/Sa every other week). Does note constipation and is taking stool softners  Labs: Last A1c value was 7.1% done 3/25/2025.  Current treatment:  Metformin 1000 mg BID, Invokana 100 mg daily,  Ozempic 2 mg weekly     Testing: Continuous Glucose Monitoring Interpretation  Zeeshan Yang  has undergone continuous glucose monitoring with the [employer] Jonathan 3 CGM with phone (connected to clinic profile).  The blood glucose tracings were evaluated for two weeks prior to office visit. Blood glucose tracings demonstrated no significant hyperglycemia. There were no significant hypoglycemia notedduring the weeks of evaluation.              History/Other:    Allergies, PMH, SocHx and FHx reviewed and updated as appropriate in Epic on   No outpatient medications have been marked as taking for the 3/25/25 encounter (Office Visit) with Francine Dorantes DO.     Allergies   Allergen Reactions    Dust Mites ASTHMA    Pollen OTHER (SEE COMMENTS)    Ragweed OTHER (SEE COMMENTS)    Tree Extract OTHER (SEE COMMENTS)     Current Outpatient Medications   Medication Sig Dispense Refill    OZEMPIC, 2 MG/DOSE, 8 MG/3ML Subcutaneous Solution Pen-injector INJECT SUBCUTANEOUSLY 2 MG EVERY WEEK 9 mL 0    OMEPRAZOLE 20 MG Oral Capsule Delayed Release TAKE 1 CAPSULE BY MOUTH DAILY 90 capsule 3    hydrocortisone 2.5 % External Cream Apply sparingly as needed up to two times per day 28 g 0    dibucaine 1 % External Ointment Apply 1 Application topically as needed for Pain (up to two times per day). 28 g 0    ergocalciferol 1.25 MG (02766 UT) Oral Cap Take 1 capsule (50,000 Units total) by mouth once a week. 12 capsule 0    atorvastatin 20 MG Oral Tab TAKE 1 TABLET BY MOUTH NIGHTLY 90 tablet 1    Continuous Glucose Sensor (FREESTYLE JONATHAN 3 PLUS SENSOR) Does not apply Misc 1 each by Other route every 15 (fifteen) days. 2 each 2    Canagliflozin (INVOKANA) 100 MG Oral Tab Take 100 mg by mouth daily. 90 tablet 0    metFORMIN HCl 1000 MG Oral Tab Take 1 tablet (1,000 mg total) by mouth 2 (two) times daily with meals. 180 tablet 0    ESCITALOPRAM 10 MG Oral Tab TAKE 1 TABLET BY MOUTH DAILY 90 tablet 3    losartan 50 MG Oral Tab TAKE 1  TABLET BY MOUTH DAILY 90 tablet 2    Blood Glucose Monitoring Suppl (ONETOUCH VERIO) w/Device Does not apply Kit 1 each daily. 1 kit 0    aspirin 81 MG Oral Tab EC Take 1 tablet (81 mg total) by mouth daily.      albuterol (VENTOLIN HFA) 108 (90 Base) MCG/ACT Inhalation Aero Soln Inhale 2 puffs into the lungs every 4 (four) hours as needed for Wheezing or Shortness of Breath. 1 each 3    Insulin Pen Needle (PEN NEEDLES) 32G X 4 MM Does not apply Misc 1 each by Does not apply route every 7 days. 90 each 0     Past Medical History:    Acquired hypothyroidism    Adjustment disorder    With mixed depression and anxiety- psychology dr trice mcadams 3.2017    Asthma (HCC)    Carotid stenosis    Depression    Diabetes (HCC)    Diastolic dysfunction    Diverticula of colon    of sigmoid colon    Empty sella (HCC)    a 1.2 cm mass is identified within th pituitary gland consistent with empty sella, a benign process    GERD (gastroesophageal reflux disease)    Hyperlipidemia    Hypogonadotropic hypogonadism (HCC)    Internal hemorrhoids    Low back pain    recurrent    Mixed hyperlipidemia    Mononucleosis    Murmur, cardiac    BELLA on CPAP    Schatzki's ring    Sleep apnea    Type 2 diabetes mellitus, uncontrolled     Family History   Problem Relation Age of Onset    Diabetes Mother     Other (COPD) Mother     Asthma Mother     Depression Mother     Other (lung cancer) Father     Other (heroin use) Sister     Heart Attack Maternal Grandfather     Other (colon cancer) Other     Bipolar Disorder Daughter     Depression Daughter     Prostate Cancer Neg      Social history: Reviewed.      ROS/Exam    REVIEW OF SYSTEMS: Ten point review of systems has been performed and is otherwise negative and/or non-contributory, except as described above.     VITALS  Vitals:    03/25/25 0934   BP: 130/58   Pulse: 86   SpO2: 97%   Weight: 174 lb (78.9 kg)   Height: 5' 11\" (1.803 m)       Wt Readings from Last 6 Encounters:   03/25/25 174 lb (78.9  kg)   03/20/25 174 lb (78.9 kg)   02/18/25 173 lb (78.5 kg)   12/31/24 175 lb (79.4 kg)   12/24/24 172 lb (78 kg)   08/06/24 180 lb (81.6 kg)       PHYSICAL EXAM  CONSTITUTIONAL:  awake, alert, cooperative, no apparent distress   PSYCH: normal affect  LUNGS: breathing comfortably  CARDIOVASCULAR:  regular rate   NECK:  no palpable thyroid nodules    Musculoskeletal:  Diabetes foot exam:   Good foot hygiene.   Bilateral barefoot skin diabetic exam: normal.  Visualized feet and the appearance : normal.  Bilateral monofilament/sensation of both feet is normal. Vibration to dorsum to the first toe perceived.   Bilateral 2+ pedal pulse pedal pulse exam       Labs/Imaging:   Lab Results   Component Value Date    CHOLEST 123 07/10/2024    CHOLEST 95 01/08/2024    TRIG 72 07/10/2024    TRIG 86 01/08/2024    HDL 47 07/10/2024    HDL 44 01/08/2024    LDL 61 07/10/2024    LDL 34 01/08/2024     Lab Results   Component Value Date    MICROALBCREA 7.9 12/27/2024    MICROALBCREA  07/10/2024      Comment:      Unable to calculate due to Urine Microalbumin <0.5 mg/dL        Lab Results   Component Value Date    CREATSERUM 0.83 03/20/2025    CREATSERUM 0.83 12/27/2024    EGFRCR 98 03/20/2025    EGFRCR 98 12/27/2024     Lab Results   Component Value Date    AST 12 03/20/2025    AST 14 12/27/2024    ALT 10 03/20/2025    ALT 10 12/27/2024       Lab Results   Component Value Date    TSH 2.840 12/27/2024    TSH 1.258 08/06/2024    T4F 1.1 12/27/2024         Overall glucose control:  Lab Results   Component Value Date    A1C 7.1 (A) 03/25/2025    A1C 6.9 (H) 12/27/2024    A1C 6.6 (A) 12/24/2024    A1C 7.4 (H) 07/10/2024    A1C 7.1 (H) 01/08/2024       Supplementary Documentation:   -Surveillance for Diabetes Complications & Risks  Foot exam/neuropathy: Last Foot Exam: 12/24/24    Retinopathy screening: No data recorded  No data recorded      Assessment & Plan:     ICD-10-CM    1. Type 2 diabetes mellitus with diabetic neuropathy, without  long-term current use of insulin (HCC)  E11.40 POC Hemoglobin A1C     Canagliflozin (INVOKANA) 100 MG Oral Tab     metFORMIN HCl 1000 MG Oral Tab     OZEMPIC, 2 MG/DOSE, 8 MG/3ML Subcutaneous Solution Pen-injector      2. Vitamin D deficiency  E55.9 VITAMIN D, 25-HYDROXY [49417][Q]      3. Secondary hypothyroidism  E03.8 TSH and Free T4 [E]          Zeeshan Yang is a pleasant 65 year old male here for evaluation of:    #Diabetes- PMHx of Type 2 diabetes mellitus diagnosed in 2014.      - Last A1c value was 7.1% done 3/25/2025.   - Goal <7%. Importance of better glucose control in preventing onset/progression of end-organ damage discussed, as well as goals of therapy and clinical significance of A1C.   Plan   - med changes:  Continue  Metformin 1000 mg BID, Invokana 100 mg daily, Ozempic 2 mg weekly  Reviewed patient to continue to make lifestyle changes including increase in exercise  - Schedule eye exam prior  to follow up  - continue [employer]e 3+ CGM with phone (connected to clinic profile)  - the patient is not on insulin and does not have history of recurrent hypoglycemia, CGM ordered, likely will have to pay out of pocket for CGM  - continue to check BG 2x/day using glucometer or CGM  - SGLT2i instructions provided re: surgery, times of illness/dehydration    -See above header \"Supplementary Documentation\" for surveillance for diabetes complications & risks    #Nephropathy screening/CKD:   Lab Results   Component Value Date    EGFRCR 98 03/20/2025    MICROALBCREA 7.9 12/27/2024      #Blood pressure control: SBP is to goal <130   BP Readings from Last 1 Encounters:   03/25/25 130/58   Continue losartan Tabs - 50 MG     #Hyperlipidemia/Lipids: LDL is to goal   Lab Results   Component Value Date    LDL 61 07/10/2024    TRIG 72 07/10/2024   Continue atorvastatin Tabs - 20 MG       #Empty Sella  #Hypogonadotropic Hypogonadism  -Pathophysiology of condition, usual clinical course, rx options with associated  indications/risks/benefits have previously been d/w pt in detail.  -He has sequalae of secondary hypothyroidism and secondary hypogonadism (for which he has deferred treatment for in the past)  -Last MRI stable in 2013.   -2016 Total Te low   -Had TRT in the past and did not like it, currently he still prefers to remain off of TRT   Plan:  -Consider DEXA at age 66  , he denied any hx of bone fracture in his lifetime.     #Central Hypothyroidism  -Pathophysiology of condition and goals of therapy discussed    -Indications/risks/benefits of treatment vs no treatment d/w pt previously and at this visit   -Patient's TFTs (specifically his FT4 levels) have been monitored for several years.   -He has prefered to stay off meds. Was previously on LT4 but had SE of anxiety  -Last set of thyroid labs within range December 2024  Plan:  -Repeat TFTs prior to follow-up visit    Return in about 6 months (around 9/25/2025).  The above plan was discussed in detail with the patient who verbalized understanding and agreement.      Francine Dorantes DO  Counts include 234 beds at the [facility]  3/25/2025     In reviewing this note, please be advised that Dragon Voice Recognition software used to dictate the note may have made errors in recognizing some of the words or phrases.     Note to patient: The 21 Century Cures Act makes medical notes like these available to patients in the interest of transparency. However, be advised this is a medical document. It is intended as peer to peer communication. It is written in medical language and may contain abbreviations or verbiage that are unfamiliar. It may appear blunt or direct. Medical documents are intended to carry relevant information, facts as evident, and the clinical opinion of the practitioner.

## 2025-03-25 NOTE — PATIENT INSTRUCTIONS
Summary of today's visit:  Medication changes:    Continue current regimen  Please schedule your eye exam prior to your follow up visit and have your doctor fax the report to our office.  Please complete your labs prior to your follow up visit.  Start vitamin D 2000 units daily     General follow up information:  Please let us know if you require any refills at least 1 week prior to your medication running out. If you do run out of medication, please call our office ASAP to request refills (do not wait until your follow up).   Please call our office if sugars at home are consistently greater than 250 or less than 70 for medication adjustment (do not wait until your follow up appointment).  The on-call pager is for emergencies only. If you are a type 1 diabetic and run out of insulin after business hours 8AM-4PM, you may call the on-call pager for a refill to a 24 hour pharmacy. All other refill requests should be requested during business hours.       Return Visit:  [x]  APN in 3 months- [facility] for diabetes  [] Video visit  []  In person only  [x]  Physician in 6 months for diabetes, thyroid, vitamin D   []  Directions to 1st floor lab    []  Give blood sugar log  []  PAP paperwork for Ozempic/Jardiance (english/Mosotho)   []  Diabetes Education:    []  Carb Aware T2DM (60)   []  Carb count refresh T1DM (60)   [] CGM start _____________ (30)   []  Pump 101 (60)   []  Schedule with Bear for ___________ (60)   []  DMBO (60)      HOW TO TREAT LOW BLOOD SUGAR (Hypoglycemia)  Low blood sugar = Less than 70, or if you start to have symptoms  Symptoms: Shaking or trembling, fast heart rate, extreme hunger, sweating, confusion/difficulty concentrating, dizziness    How to treat a low blood sugar if you are able to eat/drink: The Rule of 15/15  If you are using a continuous glucose monitor that says you are low, but you do not have any symptoms, verify on fingerstick that your blood sugar is actually <70 before treating.   Eat  15 grams of carbs (see examples below)  Check your blood sugar after 15 minutes. If it’s still below your target range, have another serving.   Repeat these steps until sugar is >90. Once it’s in range, if you're nervous about your sugar going low again, have a protein source (ie, a spoonful of peanut butter).   If you have a CGM, you want to look for how your arrow has changed. If you arrow is pointed up or sideways after 15 min, give your CGM more time OR check with a finger stick. Try not to eat more food until at least 15 min after the first BG check - otherwise you risk spiking your sugar too high.  If you are experiencing symptoms and you are unable to check your blood glucose for any reason, treat the hypoglycemia.  If someone has a low blood sugar and is unconscious: Don’t hesitate to call 911. Use emergency glucagon. If someone is unconscious and glucagon is not available or someone does not know how to use it, call 911 immediately.     To treat a low, carry with you easy, pre-portioned treatment for low blood sugars that are 15G of carbs:   - Children sized squeeze pouch applesauce (high fiber + carbs help prevent too high of a spike)  - Small children's sized juicebox- 15g carb --> 4oz juice box  - Glucose tablets from [employer], you can find them near diabetes supplies --> Note, you will need to eat 3-4 tablets to get to 15g of carbs  - Child sized fruit snack pack- look for one with 15 grams of total carbohydrate  - Choice of how to treat your low is important. Complex carbs, or foods that contain fats along with carbs (like chocolate) can slow the absorption of glucose and should NOT be used to treat an emergency low.

## 2025-04-01 ENCOUNTER — TELEPHONE (OUTPATIENT)
Facility: CLINIC | Age: 65
End: 2025-04-01

## 2025-04-01 DIAGNOSIS — E11.40 TYPE 2 DIABETES MELLITUS WITH DIABETIC NEUROPATHY, WITHOUT LONG-TERM CURRENT USE OF INSULIN (HCC): ICD-10-CM

## 2025-04-01 RX ORDER — HYDROCHLOROTHIAZIDE 12.5 MG/1
1 CAPSULE ORAL
Qty: 6 EACH | Refills: 1 | Status: SHIPPED | OUTPATIENT
Start: 2025-04-01

## 2025-04-01 NOTE — TELEPHONE ENCOUNTER
Received call from [facility] pharmacy     Endocrine Refill protocol for CGM supplies     Protocol Criteria:  PASSED Reason: N/A    If below requirement is met, send a 90-day supply with 1 refill per provider protocol.     Verify appointment with Endocrinology completed in the last 12 months or scheduled in the next 6 months     Last completed office visit:3/25/2025 Francine Dorantes DO   Next scheduled Follow up:   Future Appointments   Date Time Provider Department Center   6/23/2025  8:45 AM Candice Toney APRN [facility]   6/30/2025  9:00 AM Alen Henson DO [facility] [address]   9/30/2025  8:30 AM Francine Dorantes DO [id number] [facility] [place]      Passed and sent per protocol.

## 2025-04-27 DIAGNOSIS — I10 ESSENTIAL HYPERTENSION: ICD-10-CM

## 2025-04-29 RX ORDER — LOSARTAN POTASSIUM 50 MG/1
50 TABLET ORAL DAILY
Qty: 90 TABLET | Refills: 2 | Status: SHIPPED | OUTPATIENT
Start: 2025-04-29

## 2025-04-29 NOTE — TELEPHONE ENCOUNTER
Requested Prescriptions     Pending Prescriptions Disp Refills    LOSARTAN 50 MG Oral Tab [Pharmacy Med Name: Losartan Potassium 50 MG Oral Tablet] 90 tablet 3     Sig: TAKE 1 TABLET BY MOUTH DAILY     LOV: 12/31/24  RTC:   Last Relevant Labs:   Filled: 9/12/24 #90 with 2 refills    Future Appointments   Date Time Provider Department Center   6/23/2025  8:45 AM Candice Toney APRN [facility]   6/30/2025  9:00 AM Alen Henson DO [facility] [address]   9/30/2025  8:30 AM Francine Dorantes DO [id number] [facility] [place]

## 2025-05-02 ENCOUNTER — TELEPHONE (OUTPATIENT)
Facility: CLINIC | Age: 65
End: 2025-05-02

## 2025-05-30 DIAGNOSIS — E11.40 TYPE 2 DIABETES MELLITUS WITH DIABETIC NEUROPATHY, WITHOUT LONG-TERM CURRENT USE OF INSULIN (HCC): ICD-10-CM

## 2025-05-30 RX ORDER — SEMAGLUTIDE 2.68 MG/ML
2 INJECTION, SOLUTION SUBCUTANEOUS WEEKLY
Qty: 9 ML | Refills: 1 | Status: SHIPPED | OUTPATIENT
Start: 2025-05-30

## 2025-05-30 NOTE — TELEPHONE ENCOUNTER
Endocrine Refill protocol for oral and injectable diabetic medications    Protocol Criteria:  PASSED  Reason: N/A    If all below requirements are met, send a 90-day supply with 1 refill per provider protocol.    Verify appointment with Endocrinology completed in the last 6 months or scheduled in the next 3 months.  Verify A1C has been completed within the last 6 months and is below 8.5%     Last completed office visit: 3/25/2025 Francine Dorantes DO   Next scheduled Follow up:   Future Appointments   Date Time Provider Department Center   6/23/2025  8:45 AM Candice Toney APRN [facility]   6/30/2025  9:00 AM Alen Henson DO [facility] [address]   9/30/2025  8:30 AM Francine Dorantes DO [id number] [facility]      Last A1c result: Last A1c value was 7.1% done 3/25/2025.

## 2025-06-17 ENCOUNTER — LAB ENCOUNTER (OUTPATIENT)
Dept: LAB | Age: 65
End: 2025-06-17
Attending: FAMILY MEDICINE
Payer: COMMERCIAL

## 2025-06-17 ENCOUNTER — LAB ENCOUNTER (OUTPATIENT)
Dept: LAB | Age: 65
End: 2025-06-17
Attending: STUDENT IN AN ORGANIZED HEALTH CARE EDUCATION/TRAINING PROGRAM
Payer: COMMERCIAL

## 2025-06-17 DIAGNOSIS — E55.9 VITAMIN D DEFICIENCY: ICD-10-CM

## 2025-06-17 DIAGNOSIS — E03.8 SECONDARY HYPOTHYROIDISM: ICD-10-CM

## 2025-06-17 DIAGNOSIS — E11.9 TYPE 2 DIABETES MELLITUS WITHOUT COMPLICATION, WITHOUT LONG-TERM CURRENT USE OF INSULIN (HCC): ICD-10-CM

## 2025-06-17 LAB
ALBUMIN SERPL-MCNC: 5.2 G/DL (ref 3.2–4.8)
ALBUMIN/GLOB SERPL: 2.4 {RATIO} (ref 1–2)
ALP LIVER SERPL-CCNC: 90 U/L (ref 45–117)
ALT SERPL-CCNC: 21 U/L (ref 10–49)
ANION GAP SERPL CALC-SCNC: 10 MMOL/L (ref 0–18)
AST SERPL-CCNC: 19 U/L (ref ?–34)
BILIRUB SERPL-MCNC: 0.6 MG/DL (ref 0.2–1.1)
BUN BLD-MCNC: 13 MG/DL (ref 9–23)
CALCIUM BLD-MCNC: 9.3 MG/DL (ref 8.7–10.6)
CHLORIDE SERPL-SCNC: 103 MMOL/L (ref 98–112)
CHOLEST SERPL-MCNC: 147 MG/DL (ref ?–200)
CO2 SERPL-SCNC: 26 MMOL/L (ref 21–32)
CREAT BLD-MCNC: 1.07 MG/DL (ref 0.7–1.3)
CREAT UR-SCNC: 136.4 MG/DL
EGFRCR SERPLBLD CKD-EPI 2021: 77 ML/MIN/1.73M2 (ref 60–?)
EST. AVERAGE GLUCOSE BLD GHB EST-MCNC: 174 MG/DL (ref 68–126)
FASTING PATIENT LIPID ANSWER: YES
FASTING STATUS PATIENT QL REPORTED: YES
GLOBULIN PLAS-MCNC: 2.2 G/DL (ref 2–3.5)
GLUCOSE BLD-MCNC: 143 MG/DL (ref 70–99)
HBA1C MFR BLD: 7.7 % (ref ?–5.7)
HDLC SERPL-MCNC: 49 MG/DL (ref 40–59)
LDLC SERPL CALC-MCNC: 78 MG/DL (ref ?–100)
MICROALBUMIN UR-MCNC: 0.5 MG/DL
MICROALBUMIN/CREAT 24H UR-RTO: 3.7 UG/MG (ref ?–30)
NONHDLC SERPL-MCNC: 98 MG/DL (ref ?–130)
OSMOLALITY SERPL CALC.SUM OF ELEC: 291 MOSM/KG (ref 275–295)
POTASSIUM SERPL-SCNC: 4 MMOL/L (ref 3.5–5.1)
PROT SERPL-MCNC: 7.4 G/DL (ref 5.7–8.2)
SODIUM SERPL-SCNC: 139 MMOL/L (ref 136–145)
T4 FREE SERPL-MCNC: 1 NG/DL (ref 0.8–1.7)
TRIGL SERPL-MCNC: 109 MG/DL (ref 30–149)
TSI SER-ACNC: 3.29 UIU/ML (ref 0.55–4.78)
VIT D+METAB SERPL-MCNC: 33.6 NG/ML (ref 30–100)
VLDLC SERPL CALC-MCNC: 17 MG/DL (ref 0–30)

## 2025-06-17 PROCEDURE — 82570 ASSAY OF URINE CREATININE: CPT

## 2025-06-17 PROCEDURE — 80061 LIPID PANEL: CPT

## 2025-06-17 PROCEDURE — 80053 COMPREHEN METABOLIC PANEL: CPT

## 2025-06-17 PROCEDURE — 84439 ASSAY OF FREE THYROXINE: CPT

## 2025-06-17 PROCEDURE — 84443 ASSAY THYROID STIM HORMONE: CPT

## 2025-06-17 PROCEDURE — 83036 HEMOGLOBIN GLYCOSYLATED A1C: CPT

## 2025-06-17 PROCEDURE — 82306 VITAMIN D 25 HYDROXY: CPT

## 2025-06-17 PROCEDURE — 36415 COLL VENOUS BLD VENIPUNCTURE: CPT

## 2025-06-17 PROCEDURE — 82043 UR ALBUMIN QUANTITATIVE: CPT

## 2025-06-19 RX ORDER — ATORVASTATIN CALCIUM 20 MG/1
20 TABLET, FILM COATED ORAL NIGHTLY
Qty: 90 TABLET | Refills: 3 | Status: SHIPPED | OUTPATIENT
Start: 2025-06-19 | End: 2025-06-23

## 2025-06-19 NOTE — TELEPHONE ENCOUNTER
Requested Prescriptions     Pending Prescriptions Disp Refills    ATORVASTATIN 20 MG Oral Tab [Pharmacy Med Name: Atorvastatin Calcium 20 MG Oral Tablet] 90 tablet 3     Sig: TAKE 1 TABLET BY MOUTH NIGHTLY     LOV: 12/31/24  RTC:   Last Relevant Labs: 12/27/24  Filled: 12/30/24 #90 with 1 refills

## 2025-06-23 ENCOUNTER — OFFICE VISIT (OUTPATIENT)
Facility: CLINIC | Age: 65
End: 2025-06-23
Payer: COMMERCIAL

## 2025-06-23 VITALS
SYSTOLIC BLOOD PRESSURE: 128 MMHG | HEART RATE: 93 BPM | BODY MASS INDEX: 24.36 KG/M2 | WEIGHT: 174 LBS | OXYGEN SATURATION: 97 % | DIASTOLIC BLOOD PRESSURE: 76 MMHG | HEIGHT: 71 IN

## 2025-06-23 DIAGNOSIS — E78.5 HYPERLIPIDEMIA ASSOCIATED WITH TYPE 2 DIABETES MELLITUS (HCC): ICD-10-CM

## 2025-06-23 DIAGNOSIS — E11.69 HYPERLIPIDEMIA ASSOCIATED WITH TYPE 2 DIABETES MELLITUS (HCC): ICD-10-CM

## 2025-06-23 DIAGNOSIS — E11.40 TYPE 2 DIABETES MELLITUS WITH DIABETIC NEUROPATHY, WITHOUT LONG-TERM CURRENT USE OF INSULIN (HCC): Primary | ICD-10-CM

## 2025-06-23 LAB — AMB EXT GMI: 7.2 %

## 2025-06-23 PROCEDURE — 3008F BODY MASS INDEX DOCD: CPT | Performed by: NURSE PRACTITIONER

## 2025-06-23 PROCEDURE — 3074F SYST BP LT 130 MM HG: CPT | Performed by: NURSE PRACTITIONER

## 2025-06-23 PROCEDURE — G2211 COMPLEX E/M VISIT ADD ON: HCPCS | Performed by: NURSE PRACTITIONER

## 2025-06-23 PROCEDURE — 3078F DIAST BP <80 MM HG: CPT | Performed by: NURSE PRACTITIONER

## 2025-06-23 PROCEDURE — 99214 OFFICE O/P EST MOD 30 MIN: CPT | Performed by: NURSE PRACTITIONER

## 2025-06-23 PROCEDURE — 3051F HG A1C>EQUAL 7.0%<8.0%: CPT | Performed by: NURSE PRACTITIONER

## 2025-06-23 PROCEDURE — 3061F NEG MICROALBUMINURIA REV: CPT | Performed by: NURSE PRACTITIONER

## 2025-06-23 PROCEDURE — 95251 CONT GLUC MNTR ANALYSIS I&R: CPT | Performed by: NURSE PRACTITIONER

## 2025-06-23 RX ORDER — CANAGLIFLOZIN 100 MG/1
100 TABLET, FILM COATED ORAL DAILY
Qty: 90 TABLET | Refills: 0 | Status: SHIPPED | OUTPATIENT
Start: 2025-06-23

## 2025-06-23 RX ORDER — TIRZEPATIDE 7.5 MG/.5ML
7.5 INJECTION, SOLUTION SUBCUTANEOUS WEEKLY
Qty: 2 ML | Refills: 0 | Status: SHIPPED | OUTPATIENT
Start: 2025-06-23

## 2025-06-23 RX ORDER — ATORVASTATIN CALCIUM 20 MG/1
20 TABLET, FILM COATED ORAL NIGHTLY
Qty: 90 TABLET | Refills: 1 | Status: SHIPPED | OUTPATIENT
Start: 2025-06-23

## 2025-06-23 NOTE — PROGRESS NOTES
The following individual(s) verbally consented to be recorded using ambient AI listening technology and understand that they can each withdraw their consent to this listening technology at any point by asking the clinician to turn off or pause the recording:    Patient name: Zeeshan Yang  Additional names:

## 2025-06-23 NOTE — PATIENT INSTRUCTIONS
Return Visit:  With Dr. Dorantes: Return in about 3 months (around 9/30/2025) for diabetes follow up.          VISIT SUMMARY:  Today, we discussed the management of your type 2 diabetes, which has shown an increase in A1c levels. We also addressed your peripheral neuropathy, constipation, hyperlipidemia, right arm pain and tingling, and hypothyroidism.    YOUR PLAN:  -TYPE 2 DIABETES MELLITUS: Type 2 diabetes is a condition where your body does not use insulin properly, leading to high blood sugar levels. Your A1c has increased from 7.1% to 7.7%, likely due to work stress and heat exposure.   We will switch from Ozempic to Mounjaro 7.5 mg once weekly to improve glucose control.   Continue taking metformin 1000 mg twice daily and Invokana 100 mg daily.   Monitor your blood glucose levels and report back in three weeks for a potential dose adjustment.   Make sure to eat balanced meals with protein and reduce your intake of diet soda.  Double-check low glucose readings with a finger stick.    -HYPERLIPIDEMIA: Hyperlipidemia is having high levels of fats (lipids) in your blood. Your LDL cholesterol is currently 78 mg/dL, and our goal is to get it below 70 mg/dL.   Increase your atorvastatin dose to 20 mg daily and repeat your lipid panel in 3-6 months.    -CONSTIPATION: Constipation is difficulty in passing stools. It is a side effect of Ozempic, and Mounjaro may also cause it.   Continue managing with laxatives and fiber supplements, but switch to a non-flavored fiber supplement like Metamucil or Benefiber.   Increase your fluid intake and consider adding protein when consuming fiber supplements to avoid blood sugar spikes.    -HYPOTHYROIDISM: Hypothyroidism is a condition where your thyroid gland does not produce enough hormones, leading to fatigue and other symptoms.   Continue following up with Dr. Dorantes for your hypothyroidism management.    -RIGHT ARM PAIN AND TINGLING: You have been experiencing pain and  tingling in your right arm, likely due to repetitive work activities. This could also be related to a vitamin B12 deficiency.   We will refer you to your primary care doctor for evaluation and possible physical therapy. Can start over-the-counter multivitamin with vitamin B12.        INSTRUCTIONS:  Please monitor your blood glucose levels and report back in three weeks for a potential dose adjustment of Mounjaro.   Increase your atorvastatin dose to 20 mg daily and repeat your lipid panel in 3-6 months.   Follow up with your primary care doctor for evaluation of your right arm pain and tingling, and check your vitamin B12 levels.  Continue follow-up with Dr. Dorantes for your hypothyroidism management.    Contains text generated by Kylah     Updated diabetes medication instructions from today:   Diabetic Medications              Tirzepatide (MOUNJARO) 7.5 MG/0.5ML Subcutaneous Solution Auto-injector (Taking) Inject 7.5 mg into the skin once a week.    metFORMIN HCl 1000 MG Oral Tab (Taking) Take 1 tablet (1,000 mg total) by mouth 2 (two) times daily with meals.    Canagliflozin (INVOKANA) 100 MG Oral Tab (Taking) Take 100 mg by mouth daily.            Lab Results   Component Value Date    A1C 7.7 (H) 06/17/2025    A1C 7.1 (A) 03/25/2025    A1C 6.9 (H) 12/27/2024    A1C 6.6 (A) 12/24/2024    A1C 7.4 (H) 07/10/2024        General follow up information:  Please let us know if you require any refills at least 1 week prior to your medication running out. If you do run out of medication, please call our office ASAP to request refills (do not wait until your follow up).   Please call our office if sugars at home are consistently greater than 250 or less than 70 for medication adjustment (do not wait until your follow up appointment).  Lab results and imaging will typically be reviewed at follow up appointments, or within 3-5 business days of ALL results being in if you do not have an appointment scheduled in the near  future. Our office will contact you for any abnormal results requiring more urgent follow up or action.   The on-call pager is for urgent matters only. If you are a type 1 diabetic and run out of insulin after business hours 8AM-4PM, you may call the on-call pager for a refill to a 24 hour pharmacy.  If you have adrenal insufficiency and run out of steroids, you may call the on-call pager for a refill to a 24 hour pharmacy. All other refill requests should be requested during business hours.    Office phone number: [phone]; phones are open Monday-Friday 8:30-4:30.       HOW TO TREAT LOW BLOOD SUGAR (Hypoglycemia)  Low blood sugar= Less than 70, or if you start to have symptoms (below)  Symptoms: Shaking or trembling, fast heart rate, extreme hunger, sweating, confusion/difficulty concentrating, dizziness.    How to treat a low blood sugar if you are able to eat/drink: The Rule of 15/15  If you are using continuous glucose monitor that says you are low, but you do not have any symptoms, verify on fingerstick that your blood sugar is actually low before treating.   Eat 15 grams of carbs (see examples below)  Check your blood sugar after 15 minutes. If it’s still below your target range, have another serving.   Repeat these steps until it’s in your target range. Once it’s in range, if you're nervous about your sugar going low again, have a protein source (ie, a spoonful of peanut butter).   If you have a CGM you want to look for how your arrow has changed. If you arrow is pointed up or sideways after 15 min, give your CGM more time OR check with a finger stick. Try not to eat more food until at least 15 min after the first BG check - otherwise you risk having a rebound high.  If you are experiencing symptoms and you are unable to check your blood glucose for any reason, treat the hypoglycemia.  If someone has a low blood sugar and is unconscious: Don’t hesitate to call 911. If someone is unconscious and glucagon  is not available or someone does not know how to use it, call 911 immediately.     To treat a low, I recommend you carry with you easy, pre-portioned treatment for low blood sugars that are 15G of carbs:   - Children sized squeeze pouch applesauce (high fiber + carbs help prevent too high of a spike)  - Small children's sized juicebox- 15g carb --> 4oz juice box  - Glucose tablets from [email or web address], you can find them near diabetes supplies --> Note, you will need to eat 3-4 tablets to get to 15g of carbs  - Children sized fruit snack pack- look for one with 15 grams of total carbohydrate  - Choice of how to treat your low is important. Complex carbs, or foods that contain fats along with carbs (like chocolate) can slow the absorption of glucose and should NOT be used to treat an emergency low

## 2025-06-23 NOTE — PROGRESS NOTES
[facility]    Name: Zeeshan Yang    Date: 6/23/2025     Chief complaint: Follow - Up (PT here for routine T2DM f/u, per pt c/o high bg /Last A1c value was 7.7% done 6/17/2025)       Subjective:    History of Present Illness  Harsha Yang is a 65 year old male with type 2 diabetes who presents for management of his diabetes.    He is currently on metformin 1000 mg twice daily, Invokana 100 mg daily, and Ozempic 2 mg weekly for diabetes management. He experiences constipation as a side effect of Ozempic, managed with laxatives once or twice a week and a fiber supplement in the form of gummy bears. 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    He works 12-hour night shifts from 7 PM to 7 AM, with a rotating schedule of three to four nights on and off. His A1c increased from 7.1% in March to 7.7% in June, which he attributes to stress and heat at work, particularly during night shifts. He has not experienced any recent hypoglycemic episodes, although he had a low glucose alarm on May 31st, which he managed by consuming food.    He reports sharp pain and tingling from his neck down to his right hand, attributed to repetitive work activities involving heavy lifting, experienced for about a month. He has neuropathy in his left foot with constant pain and tingling, which has been progressively worsening. He is not on medication for neuropathy and does not take vitamin B12 supplements.    He has not had any recent hospitalizations or emergency room visits. He takes losartan 50 mg daily for blood pressure and atorvastatin 20 mg daily for cholesterol management, previously reduced to half the dose  ( 10 mg daily)on his general practitioner's advice. His LDL was 78 mg/dL in June. His last foot exam was in December 2024, and his eye exam in  May 2025 showed no retinopathy.    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 He drinks diet soda and water, and his wife, a nurse, helps manage his diet at home. He walks 10,000 steps a day at work. His diet varies, with less structured eating habits during night shifts, often consisting of one meal and snacks. At home, he sometimes has coffee and a donut for breakfast, and his wife encourages him to eat more balanced meals.    DM meds at office visit:    OZEMPIC, 2 MG/DOSE, 8 MG/3ML Subcutaneous Solution Pen-injector Inject 2 mg into the skin once a week.    Canagliflozin (INVOKANA) 100 MG Oral Tab Take 100 mg by mouth daily.    metFORMIN HCl 1000 MG Oral Tab Take 1 tablet (1,000 mg total) by mouth 2 (two) times daily with meals.       Continuous Glucose Monitoring Interpretation  Zeeshan Yang has undergone continuous glucose monitoring.  The blood glucose tracings were evaluated for two weeks prior to office visit. Blood glucose tracings demonstrated areas of hyperglycemia pre breakfast and prelunch and sometimes post-dinner. There were no significant hypoglycemia notedduring the weeks of evaluation.    Date: 05/27-06/23/25: TIR 73%, GMI 7.2% ,  low 0% , very low 0%, GV 22.3%kristen, Sensor usage: 96%      History/Other:    Allergies, PMH, SocHx and FHx reviewed and updated as appropriate in Epic on    Tirzepatide (MOUNJARO) 7.5 MG/0.5ML Subcutaneous Solution Auto-injector Inject 7.5 mg into the skin once a week. 2 mL 0    atorvastatin 20 MG Oral Tab Take 1 tablet (20 mg total) by mouth nightly. 90 tablet 1    metFORMIN HCl 1000 MG Oral Tab Take 1 tablet (1,000 mg total) by mouth 2 (two) times daily with meals. 180 tablet 0    Canagliflozin (INVOKANA) 100 MG Oral Tab Take 100 mg by mouth daily. 90 tablet 0    losartan 50 MG Oral Tab TAKE 1 TABLET BY MOUTH DAILY 90 tablet 2    Continuous Glucose Sensor (FREESTYLE KRISTEN 3 PLUS SENSOR) Does not apply Misc 1 each  by Other route every 15 (fifteen) days. 6 each 1    OMEPRAZOLE 20 MG Oral Capsule Delayed Release TAKE 1 CAPSULE BY MOUTH DAILY 90 capsule 3    ESCITALOPRAM 10 MG Oral Tab TAKE 1 TABLET BY MOUTH DAILY 90 tablet 3    Blood Glucose Monitoring Suppl (ONETOUCH VERIO) w/Device Does not apply Kit 1 each daily. 1 kit 0    aspirin 81 MG Oral Tab EC Take 1 tablet (81 mg total) by mouth daily.      albuterol (VENTOLIN HFA) 108 (90 Base) MCG/ACT Inhalation Aero Soln Inhale 2 puffs into the lungs every 4 (four) hours as needed for Wheezing or Shortness of Breath. 1 each 3    Insulin Pen Needle (PEN NEEDLES) 32G X 4 MM Does not apply Misc 1 each by Does not apply route every 7 days. 90 each 0     Allergies   Allergen Reactions    Dust Mites ASTHMA    Pollen OTHER (SEE COMMENTS)    Ragweed OTHER (SEE COMMENTS)    Tree Extract OTHER (SEE COMMENTS)     Current Outpatient Medications   Medication Sig Dispense Refill    Tirzepatide (MOUNJARO) 7.5 MG/0.5ML Subcutaneous Solution Auto-injector Inject 7.5 mg into the skin once a week. 2 mL 0    atorvastatin 20 MG Oral Tab Take 1 tablet (20 mg total) by mouth nightly. 90 tablet 1    metFORMIN HCl 1000 MG Oral Tab Take 1 tablet (1,000 mg total) by mouth 2 (two) times daily with meals. 180 tablet 0    Canagliflozin (INVOKANA) 100 MG Oral Tab Take 100 mg by mouth daily. 90 tablet 0    losartan 50 MG Oral Tab TAKE 1 TABLET BY MOUTH DAILY 90 tablet 2    Continuous Glucose Sensor (FREESTYLE MIRTHA 3 PLUS SENSOR) Does not apply Misc 1 each by Other route every 15 (fifteen) days. 6 each 1    OMEPRAZOLE 20 MG Oral Capsule Delayed Release TAKE 1 CAPSULE BY MOUTH DAILY 90 capsule 3    ESCITALOPRAM 10 MG Oral Tab TAKE 1 TABLET BY MOUTH DAILY 90 tablet 3    Blood Glucose Monitoring Suppl (ONETOUCH VERIO) w/Device Does not apply Kit 1 each daily. 1 kit 0    aspirin 81 MG Oral Tab EC Take 1 tablet (81 mg total) by mouth daily.      albuterol (VENTOLIN HFA) 108 (90 Base) MCG/ACT Inhalation Aero Soln  Inhale 2 puffs into the lungs every 4 (four) hours as needed for Wheezing or Shortness of Breath. 1 each 3    Insulin Pen Needle (PEN NEEDLES) 32G X 4 MM Does not apply Misc 1 each by Does not apply route every 7 days. 90 each 0    hydrocortisone 2.5 % External Cream Apply sparingly as needed up to two times per day 28 g 0    dibucaine 1 % External Ointment Apply 1 Application topically as needed for Pain (up to two times per day). 28 g 0     Past Medical History:    Acquired hypothyroidism    Adjustment disorder    With mixed depression and anxiety- psychology dr trice mcadams 3.2017    Asthma (HCC)    Carotid stenosis    Depression    Diabetes (HCC)    Diastolic dysfunction    Diverticula of colon    of sigmoid colon    Empty sella (HCC)    a 1.2 cm mass is identified within th pituitary gland consistent with empty sella, a benign process    GERD (gastroesophageal reflux disease)    Hyperlipidemia    Hypogonadotropic hypogonadism (HCC)    Internal hemorrhoids    Low back pain    recurrent    Mixed hyperlipidemia    Mononucleosis    Murmur, cardiac    BELLA on CPAP    Schatzki's ring    Sleep apnea    Type 2 diabetes mellitus, uncontrolled     Family History   Problem Relation Age of Onset    Diabetes Mother     Other (COPD) Mother     Asthma Mother     Depression Mother     Other (lung cancer) Father     Other (heroin use) Sister     Heart Attack Maternal Grandfather     Other (colon cancer) Other     Bipolar Disorder Daughter     Depression Daughter     Prostate Cancer Neg      Social history: Reviewed.      ROS/Exam    REVIEW OF SYSTEMS: Ten point review of systems has been performed and is otherwise negative and/or non-contributory, except as described above.     VITALS  Vitals:    06/23/25 0843   BP: 128/76   Pulse: 93   SpO2: 97%   Weight: 174 lb (78.9 kg)   Height: 5' 11\" (1.803 m)       Body mass index is 24.27 kg/m².  Wt Readings from Last 6 Encounters:   06/23/25 174 lb (78.9 kg)   03/25/25 174 lb (78.9 kg)    03/20/25 174 lb (78.9 kg)   02/18/25 173 lb (78.5 kg)   12/31/24 175 lb (79.4 kg)   12/24/24 172 lb (78 kg)       PHYSICAL EXAM  CONSTITUTIONAL:  awake, alert, cooperative, no apparent distress, and appears stated age   PSYCH: normal affect  LUNGS: breathing comfortably  CARDIOVASCULAR:  regular rate   NECK:  no palpable thyroid nodules     Labs/Imaging:    Lab Results   Component Value Date    CHOLEST 147 06/17/2025    CHOLEST 123 07/10/2024    TRIG 109 06/17/2025    TRIG 72 07/10/2024    HDL 49 06/17/2025    HDL 47 07/10/2024    LDL 78 06/17/2025    LDL 61 07/10/2024     Lab Results   Component Value Date    MICROALBCREA 3.7 06/17/2025    MICROALBCREA 7.9 12/27/2024      Lab Results   Component Value Date    CREATSERUM 1.07 06/17/2025    CREATSERUM 0.83 03/20/2025    EGFRCR 77 06/17/2025    EGFRCR 98 03/20/2025     Lab Results   Component Value Date    AST 19 06/17/2025    AST 12 03/20/2025    ALT 21 06/17/2025    ALT 10 03/20/2025     Overall glucose control:  Lab Results   Component Value Date    A1C 7.7 (H) 06/17/2025    A1C 7.1 (A) 03/25/2025    A1C 6.9 (H) 12/27/2024    A1C 6.6 (A) 12/24/2024    A1C 7.4 (H) 07/10/2024       Supplementary Documentation:   -Surveillance for Diabetes Complications & Risks  Foot exam/neuropathy: Last Foot Exam: 12/24/24    Retinopathy screening: Last Dilated Eye Exam: 05/01/25  Eye Exam shows Diabetic Retinopathy?: No    Lipid screening:   Lab Results   Component Value Date    LDL 78 06/17/2025    TRIG 109 06/17/2025   Cholesterol Meds: atorvastatin Tabs - 20 MG     Nephropathy screening:   Lab Results   Component Value Date    EGFRCR 77 06/17/2025    MALBCREACALC 4 01/16/2013    MICROALBCREA 3.7 06/17/2025     Lab Results   Component Value Date    CREAUR 171 01/16/2013    MICROALBUMIN 0.6 01/16/2013    MALBCREACALC 4 01/16/2013     Blood pressure control:   BP Readings from Last 1 Encounters:   06/23/25 128/76   BP Meds: losartan Tabs - 50 MG       Assessment & Plan:    Overview:   1. Type 2 diabetes mellitus with diabetic neuropathy, without long-term current use of insulin (HCC) (Primary)  Overview:  DM hx:  -Diagnosed with diabetes in 2014  -Family history- yes, strong diabetes family hx     Previously trialed/failed DM meds: Januvia in 2014 , ozempic switched to mounjaro  Orders:  -     GLUC MNTR CONT REC FROM NTRSTL TISS FLU PHYS I&R  -     Mounjaro; Inject 7.5 mg into the skin once a week.  Dispense: 2 mL; Refill: 0  -     metFORMIN HCl; Take 1 tablet (1,000 mg total) by mouth 2 (two) times daily with meals.  Dispense: 180 tablet; Refill: 0  -     Invokana; Take 100 mg by mouth daily.  Dispense: 90 tablet; Refill: 0  2. Hyperlipidemia associated with type 2 diabetes mellitus (HCC)  -     Atorvastatin Calcium; Take 1 tablet (20 mg total) by mouth nightly.  Dispense: 90 tablet; Refill: 1      Assessment & Plan  Type 2 diabetes mellitus  Type 2 diabetes mellitus with recent increase in A1c from 7.1% in March to 7.7% in June. Elevated nocturnal blood glucose levels likely due to work stress and heat exposure. Current medications include metformin, Invokana, and Ozempic. Considering switch to Mounjaro due to suboptimal control with Ozempic. Mounjaro targets two gut receptors, potentially offering better glucose control. Initial dose of 7.5 mg is chosen to monitor tolerance, especially regarding constipation.  Med changes:   - Switch Ozempic to Mounjaro 7.5 mg once weekly.  - Continue metformin 1000 mg twice daily and Invokana 100 mg daily.  - Advise to monitor blood glucose levels and report in three weeks for potential dose adjustment to Mounjaro 10 mg.  - Educate on dietary modifications, including balanced meals with protein and reduced intake of diet soda.  - Advise to double-check low glucose readings with a finger stick.  --Constipation associated with Ozempic use, currently managed with laxatives and fiber supplements. Symptoms are tolerable. Mounjaro may also cause  constipation, so monitoring is necessary.  - Advise to increase fluid intake.  - Recommend switching to a non-flavored fiber supplement like Metamucil or Benefiber.  - Consider adding protein when consuming fiber supplements to avoid blood sugar spikes.  - continue Jike Xueyuanyle Jonathan 3+ CGM with phone (connected to clinic profile)  - the patient is not on insulin and does not have history of recurrent hypoglycemia, CGM ordered, likely will have to pay out of pocket for CGM  - continue to check BG 2x/day using glucometer or CGM  - SGLT2i instructions provided re: surgery, times of illness/dehydration   - no strict contraindication for SGLT2i, metformin, GLP1a     Hyperlipidemia  Hyperlipidemia with recent LDL increase to 78 mg/dL. Previously on reduced atorvastatin dose due to low LDL levels. Current LDL goal is above goal of < 70 mg/dL per ADA guidelines.  - Increase atorvastatin from 10 to 20 mg daily.  - Repeat lipid panel in 6 months.    Right arm pain and tingling with Peripheral neuropathy  Right arm pain and tingling, possibly related to repetitive work activities. Symptoms have been present for about a month and are progressively worsening. Potential B12 deficiency due to metformin use may contribute to symptoms.  - Refer to primary care for evaluation and possible physical therapy.  - Recommend checking vitamin B12 levels due to potential deficiency from metformin use.  - Suggest starting over-the-counter multivitamins with vitamin B12.      Updated DM med list:   Diabetic Medications              Tirzepatide (MOUNJARO) 7.5 MG/0.5ML Subcutaneous Solution Auto-injector (Taking) Inject 7.5 mg into the skin once a week.    metFORMIN HCl 1000 MG Oral Tab (Taking) Take 1 tablet (1,000 mg total) by mouth 2 (two) times daily with meals.    Canagliflozin (INVOKANA) 100 MG Oral Tab (Taking) Take 100 mg by mouth daily.          See above header \"Supplementary Documentation\" for surveillance for diabetes complications &  risks    Return in about 3 months (around 9/30/2025) for diabetes follow up. With Dr. Dorantes for DM, sescondary Hypothyroid    MATI Hathaway  Endocrinology, Diabetes & Metabolism   6/23/2025    Note to patient: The 21 Century Cures Act makes medical notes like these available to patients in the interest of transparency. However, be advised this is a medical document. It is intended as peer to peer communication. It is written in medical language and may contain abbreviations or verbiage that are unfamiliar. It may appear blunt or direct. Medical documents are intended to carry relevant information, facts as evident, and the clinical opinion of the practitioner.

## 2025-06-27 ENCOUNTER — TELEPHONE (OUTPATIENT)
Facility: CLINIC | Age: 65
End: 2025-06-27

## 2025-06-27 DIAGNOSIS — E11.40 TYPE 2 DIABETES MELLITUS WITH DIABETIC NEUROPATHY, WITHOUT LONG-TERM CURRENT USE OF INSULIN (HCC): ICD-10-CM

## 2025-06-27 DIAGNOSIS — E78.5 HYPERLIPIDEMIA ASSOCIATED WITH TYPE 2 DIABETES MELLITUS (HCC): ICD-10-CM

## 2025-06-27 DIAGNOSIS — E11.69 HYPERLIPIDEMIA ASSOCIATED WITH TYPE 2 DIABETES MELLITUS (HCC): ICD-10-CM

## 2025-06-27 RX ORDER — TIRZEPATIDE 7.5 MG/.5ML
7.5 INJECTION, SOLUTION SUBCUTANEOUS WEEKLY
Qty: 2 ML | Refills: 1 | Status: SHIPPED | OUTPATIENT
Start: 2025-06-27

## 2025-06-27 RX ORDER — CANAGLIFLOZIN 100 MG/1
100 TABLET, FILM COATED ORAL DAILY
Qty: 90 TABLET | Refills: 1 | Status: SHIPPED | OUTPATIENT
Start: 2025-06-27

## 2025-06-27 RX ORDER — ATORVASTATIN CALCIUM 20 MG/1
20 TABLET, FILM COATED ORAL NIGHTLY
Qty: 90 TABLET | Refills: 1 | Status: SHIPPED | OUTPATIENT
Start: 2025-06-27

## 2025-06-27 NOTE — TELEPHONE ENCOUNTER
Patient has  questions on his meds not going through his mail order he has questions regarding his mounjaro and metformin  please call back this patient

## 2025-06-27 NOTE — TELEPHONE ENCOUNTER
Patient telephoned in regarding prescriptions sent to wrong pharmacy. Supposed to be [facility].     Sent to pharmacy per protocol .

## 2025-06-30 ENCOUNTER — OFFICE VISIT (OUTPATIENT)
Dept: FAMILY MEDICINE CLINIC | Facility: CLINIC | Age: 65
End: 2025-06-30
Payer: COMMERCIAL

## 2025-06-30 VITALS
SYSTOLIC BLOOD PRESSURE: 100 MMHG | HEART RATE: 108 BPM | TEMPERATURE: 97 F | HEIGHT: 71 IN | OXYGEN SATURATION: 98 % | BODY MASS INDEX: 25.76 KG/M2 | WEIGHT: 184 LBS | RESPIRATION RATE: 16 BRPM | DIASTOLIC BLOOD PRESSURE: 70 MMHG

## 2025-06-30 DIAGNOSIS — J45.20 MILD INTERMITTENT ASTHMA WITHOUT COMPLICATION (HCC): ICD-10-CM

## 2025-06-30 DIAGNOSIS — G47.33 OSA ON CPAP: ICD-10-CM

## 2025-06-30 DIAGNOSIS — M25.811 IMPINGEMENT OF RIGHT SHOULDER: ICD-10-CM

## 2025-06-30 DIAGNOSIS — Z00.00 WELL ADULT EXAM: Primary | ICD-10-CM

## 2025-06-30 DIAGNOSIS — K21.9 GASTROESOPHAGEAL REFLUX DISEASE WITHOUT ESOPHAGITIS: ICD-10-CM

## 2025-06-30 DIAGNOSIS — I10 ESSENTIAL HYPERTENSION: ICD-10-CM

## 2025-06-30 DIAGNOSIS — E11.9 TYPE 2 DIABETES MELLITUS WITHOUT COMPLICATION, WITHOUT LONG-TERM CURRENT USE OF INSULIN (HCC): ICD-10-CM

## 2025-06-30 DIAGNOSIS — G62.9 NEUROPATHY: ICD-10-CM

## 2025-06-30 DIAGNOSIS — E03.9 ACQUIRED HYPOTHYROIDISM: ICD-10-CM

## 2025-06-30 DIAGNOSIS — E78.2 MIXED HYPERLIPIDEMIA: ICD-10-CM

## 2025-06-30 DIAGNOSIS — M25.511 ACUTE PAIN OF RIGHT SHOULDER: ICD-10-CM

## 2025-06-30 PROCEDURE — G2211 COMPLEX E/M VISIT ADD ON: HCPCS | Performed by: FAMILY MEDICINE

## 2025-06-30 PROCEDURE — 3008F BODY MASS INDEX DOCD: CPT | Performed by: FAMILY MEDICINE

## 2025-06-30 PROCEDURE — 99214 OFFICE O/P EST MOD 30 MIN: CPT | Performed by: FAMILY MEDICINE

## 2025-06-30 PROCEDURE — 3074F SYST BP LT 130 MM HG: CPT | Performed by: FAMILY MEDICINE

## 2025-06-30 PROCEDURE — 3078F DIAST BP <80 MM HG: CPT | Performed by: FAMILY MEDICINE

## 2025-06-30 NOTE — PROGRESS NOTES
Subjective:   Zeeshan Yang is a 65 year old male who presents for Follow - Up       History/Other:   History of Present Illness  Labs reviewed:   Vitamin D level normal, A1c 7.7, FLP normal, CMP glucose 143, TSH and T4 normal, microalbumin normal last A1c was 7.1.      Hx of back pain with left foot paresthesia s/p 1997 for L5-S1 fused.   BELLA- on CPAP machine.   Takes prilosec daily for acid reflux due to hernia.     T2DM:     Following with Endo     He is using CGM-    DM Meds:   Invokana Tabs - 100 MG  metformin   - 1000 MG bid   Mounjaro 7.5 mg weekly  He is also on statin and losartan  Using CGM  Patient takes daily asa.     Hx of chest pain- seen by cardio.   He had nuclear stress test on 10/2024- unremarkable.  Follows with Dr. Eric- EP      PSHx: sinus surgery, L5-S1 fusion, carpal tunnel syndrome/left trigger finger    FMHx:  -maternal: DMII, colon cancer- uncles (37-77)   -paternal: DMII  Smoking: none  Alcohol: occasionally   Drugs: none   Occupation: [employer]   Colonoscopy: 2022- 10 years   PSA: normal   Tdap: 2020      He recently transitioned from Ozempic to Mounjaro , starting at a dose of 7.5 mg. His HbA1c has increased from 7.1% to 7.7%. Despite experiencing postprandial hyperglycemia, his glucose levels stabilize thereafter. He is currently on metformin and Invokana and adheres to his aspirin regimen.    He has been experiencing right shoulder and arm pain for about a month, described as a constant piercing pain with associated tingling in the hands. The pain is exacerbated by certain arm positions and activities. No neck pain or weakness is reported, and his range of motion is preserved. 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    He has a history of left-sided sciatica, characterized by a tourniquet-like sensation around the ankle with numbness and tingling, worsening in the evening. Previous  treatment with gabapentin was attempted, but he finds the symptoms tolerable and is not currently seeking alternative treatments.    He uses a CPAP machine daily for sleep apnea and takes Prilosec for a hiatal hernia. Recent lab results showed a normal lipid panel, elevated glucose levels, normal thyroid levels, and improved vitamin D levels. He experiences stress from work-related management issues but reports no depression or anxiety.   Chief Complaint Reviewed and Verified  No Further Nursing Notes to   Review  Tobacco Reviewed  Allergies Reviewed  Medications Reviewed    Problem List Reviewed  Medical History Reviewed  Surgical History   Reviewed  Family History Reviewed         Tobacco:  He has never smoked tobacco.    Current Medications[1]    PHQ-2 SCORE: 0  , done 6/30/2025   If you checked off any problems, how difficult have these problems made it for you to do your work, take care of things at home, or get along with other people?: Not difficult at all           Review of Systems:  Pertinent items are noted in HPI.    Objective:   /70   Pulse 108   Temp 97 °F (36.1 °C) (Temporal)   Resp 16   Ht 5' 11\" (1.803 m)   Wt 184 lb (83.5 kg)   SpO2 98%   BMI 25.66 kg/m²  Estimated body mass index is 25.66 kg/m² as calculated from the following:    Height as of this encounter: 5' 11\" (1.803 m).    Weight as of this encounter: 184 lb (83.5 kg).  Results  LABS  Hemoglobin A1c: 7.7%  Vitamin D: 33 ng/mL     Physical Exam  GENERAL: Alert, cooperative, well developed, no acute distress.  HEENT: Normocephalic, normal oropharynx, moist mucous membranes.  CHEST: Clear to auscultation bilaterally, no wheezes, rhonchi, or crackles.  CARDIOVASCULAR: Normal heart rate and rhythm, S1 and S2 normal without murmurs.  ABDOMEN: Soft, non-tender, non-distended, without organomegaly, normal bowel sounds.  EXTREMITIES: No cyanosis or edema.  MUSCULOSKELETAL: Normal range of motion in the shoulder, tenderness in  the right shoulder, pain on external rotation of the right shoulder, 5/5 strength in both arms.  NEUROLOGICAL: Cranial nerves grossly intact, moves all extremities without gross motor or sensory deficit.      Assessment & Plan:   1. Well adult exam (Primary)  2. Essential hypertension  3. Type 2 diabetes mellitus without complication, without long-term current use of insulin (HCC)  -     Comp Metabolic Panel (14); Future; Expected date: 12/27/2025  -     Lipid Panel; Future; Expected date: 12/27/2025  -     Hemoglobin A1C; Future; Expected date: 12/27/2025  4. Gastroesophageal reflux disease without esophagitis  5. BELLA on CPAP  6. Mixed hyperlipidemia  7. Mild intermittent asthma without complication (HCC)  8. Neuropathy  Overview:  Left foot, on gabapentin PRN  9. Acquired hypothyroidism  10. Acute pain of right shoulder  -     Physical Therapy Referral - External  11. Impingement of right shoulder  -     Physical Therapy Referral - External    Assessment & Plan  Type 2 Diabetes Mellitus  A1c increased to 7.7. Mounjaro started at 7.5 mg, showing stable glucose levels on CGM. Plan to increase dose for better A1c control.  - Increase Mounjaro dose to 10 mg if tolerated.  - Continue monitoring glucose levels with CGM.  - Consider reducing metformin or Invokana if glucose control improves.    Right Shoulder Pain with Possible Nerve Impingement  Pain and tingling suggest coracoid impingement from repetitive motion. Conservative treatment preferred.  - Provide external referral for physical therapy.  - Advise him to contact insurance for approved PT locations.    Sciatica  Tolerable left-sided sciatica with evening worsening. Prefers symptom management without additional medication.  - Monitor symptoms and consider medication if symptoms become intolerable.    Obstructive Sleep Apnea  Compliant with CPAP therapy.    Hiatal Hernia  Managed with Prilosec.  - Continue Prilosec.    General Health Maintenance  Normal lipid  panel, kidney, and thyroid function. Vitamin D improved to 33 ng/mL. Reports work-related stress without depression or anxiety.  - Continue aspirin and vitamin D3 supplementation.        No follow-ups on file.        Alen Henson DO, 6/29/2025, 10:13 PM           The following individual(s) verbally consented to be recorded using ambient AI listening technology and understand that they can each withdraw their consent to this listening technology at any point by asking the clinician to turn off or pause the recording:    Patient name: Zeeshan Yang           [1]   Current Outpatient Medications   Medication Sig Dispense Refill    Canagliflozin (INVOKANA) 100 MG Oral Tab Take 100 mg by mouth daily. 90 tablet 1    atorvastatin 20 MG Oral Tab Take 1 tablet (20 mg total) by mouth nightly. 90 tablet 1    metFORMIN HCl 1000 MG Oral Tab Take 1 tablet (1,000 mg total) by mouth 2 (two) times daily with meals. 180 tablet 1    Tirzepatide (MOUNJARO) 7.5 MG/0.5ML Subcutaneous Solution Auto-injector Inject 7.5 mg into the skin once a week. 2 mL 1    losartan 50 MG Oral Tab TAKE 1 TABLET BY MOUTH DAILY 90 tablet 2    Continuous Glucose Sensor (FREESTYLE MIRTHA 3 PLUS SENSOR) Does not apply Misc 1 each by Other route every 15 (fifteen) days. 6 each 1    OMEPRAZOLE 20 MG Oral Capsule Delayed Release TAKE 1 CAPSULE BY MOUTH DAILY 90 capsule 3    ESCITALOPRAM 10 MG Oral Tab TAKE 1 TABLET BY MOUTH DAILY 90 tablet 3    Blood Glucose Monitoring Suppl (ONETOUCH VERIO) w/Device Does not apply Kit 1 each daily. 1 kit 0    aspirin 81 MG Oral Tab EC Take 1 tablet (81 mg total) by mouth daily.      albuterol (VENTOLIN HFA) 108 (90 Base) MCG/ACT Inhalation Aero Soln Inhale 2 puffs into the lungs every 4 (four) hours as needed for Wheezing or Shortness of Breath. 1 each 3    Insulin Pen Needle (PEN NEEDLES) 32G X 4 MM Does not apply Misc 1 each by Does not apply route every 7 days. 90 each 0    hydrocortisone 2.5 % External Cream Apply  sparingly as needed up to two times per day 28 g 0

## 2025-07-01 ENCOUNTER — TELEPHONE (OUTPATIENT)
Dept: FAMILY MEDICINE CLINIC | Facility: CLINIC | Age: 65
End: 2025-07-01

## 2025-07-01 ENCOUNTER — TELEPHONE (OUTPATIENT)
Facility: CLINIC | Age: 65
End: 2025-07-01

## 2025-07-01 ENCOUNTER — MED REC SCAN ONLY (OUTPATIENT)
Dept: FAMILY MEDICINE CLINIC | Facility: CLINIC | Age: 65
End: 2025-07-01

## 2025-07-01 NOTE — TELEPHONE ENCOUNTER
Calling to have PHYSICAL THERAPY order/referral faxed to [facility] at [phone]. Informed her that insurance has not yet authorized PHYSICAL THERAPY. Faxed orders.

## 2025-07-01 NOTE — TELEPHONE ENCOUNTER
Received a call from patient stating that after switching from Ozempic to Mounjaro he's been getting low blood sugars in 60-70s. Denies symptoms of hypoglycemia. Patient uses Jonathan 3+ to monitor blood sugars. Low blood sugar readings not confirmed by fingersticking - educated to confirm low blood sugars by fingersticking since no symptoms of hypoglycemia. Patient verbalized understanding.     Asking if needs medication adjustment.     Confirmed diabetes medications:  - Metformin 1000mg twice a day  - Invokana 100mg daily  - Mounjaro 7.5mg weekly    No side effects with Mounjaro, states that it works great.   Jonathan report saved, routed to the covering provider for review.

## 2025-07-01 NOTE — TELEPHONE ENCOUNTER
Phoned patient to review bellow recommendations. Patient verified his blood sugar reading by fingersticking - 128 vs Jonathan 101 at the same time. Patient refused referral for diabetes educator as he seen dietician before. Patient verbalized understanding to all. Sent additional information through [employer] regarding blood sugar discrepancies between blood glucose meter and continuous glucose monitor.

## 2025-07-01 NOTE — TELEPHONE ENCOUNTER
Glad to hear mounjaro is going well!    Please reassure patient with the current medications none of them will cause a truly dangerous hypoglycemia incident. It appears he had a very high carb meal which can cause a more robust insulin response, thus the 'crashing' sensation after high carb meals. May want to meet with our dietician if needs further recs on dietary habit changes as I see quite a few high carb meals on his kristen reported    Do not recommend to respond by eating candy, this will just roller coaster this effect further. Suggest have something small if feeling hungry again, like protein bar or yogurt, piece of fruit    Also agree with rec to verify on fingerstick next time it's reading low as this may be inaccurate. Thanks!

## 2025-07-02 ENCOUNTER — MED REC SCAN ONLY (OUTPATIENT)
Facility: CLINIC | Age: 65
End: 2025-07-02

## 2025-07-02 DIAGNOSIS — E11.40 TYPE 2 DIABETES MELLITUS WITH DIABETIC NEUROPATHY, WITHOUT LONG-TERM CURRENT USE OF INSULIN (HCC): ICD-10-CM

## 2025-07-02 RX ORDER — HYDROCHLOROTHIAZIDE 12.5 MG/1
1 CAPSULE ORAL
Qty: 6 EACH | Refills: 1 | Status: SHIPPED | OUTPATIENT
Start: 2025-07-02

## 2025-07-09 ENCOUNTER — TELEPHONE (OUTPATIENT)
Facility: CLINIC | Age: 65
End: 2025-07-09

## 2025-07-09 ENCOUNTER — OFFICE VISIT (OUTPATIENT)
Dept: FAMILY MEDICINE CLINIC | Facility: CLINIC | Age: 65
End: 2025-07-09
Payer: COMMERCIAL

## 2025-07-09 VITALS
RESPIRATION RATE: 16 BRPM | SYSTOLIC BLOOD PRESSURE: 112 MMHG | BODY MASS INDEX: 25 KG/M2 | HEART RATE: 87 BPM | DIASTOLIC BLOOD PRESSURE: 68 MMHG | TEMPERATURE: 97 F | OXYGEN SATURATION: 98 % | WEIGHT: 181 LBS

## 2025-07-09 DIAGNOSIS — R35.0 FREQUENT URINATION: Primary | ICD-10-CM

## 2025-07-09 LAB
APPEARANCE: CLEAR
BILIRUBIN: NEGATIVE
GLUCOSE (URINE DIPSTICK): 500 MG/DL
KETONES (URINE DIPSTICK): NEGATIVE MG/DL
LEUKOCYTES: NEGATIVE
MULTISTIX LOT#: ABNORMAL NUMERIC
NITRITE, URINE: NEGATIVE
OCCULT BLOOD: NEGATIVE
PH, URINE: 6 (ref 4.5–8)
PROTEIN (URINE DIPSTICK): NEGATIVE MG/DL
SPECIFIC GRAVITY: 1.01 (ref 1–1.03)
URINE-COLOR: YELLOW
UROBILINOGEN,SEMI-QN: 0.2 MG/DL (ref 0–1.9)

## 2025-07-09 PROCEDURE — 81003 URINALYSIS AUTO W/O SCOPE: CPT | Performed by: NURSE PRACTITIONER

## 2025-07-09 PROCEDURE — 3078F DIAST BP <80 MM HG: CPT | Performed by: NURSE PRACTITIONER

## 2025-07-09 PROCEDURE — 3074F SYST BP LT 130 MM HG: CPT | Performed by: NURSE PRACTITIONER

## 2025-07-09 PROCEDURE — 99213 OFFICE O/P EST LOW 20 MIN: CPT | Performed by: NURSE PRACTITIONER

## 2025-07-09 NOTE — TELEPHONE ENCOUNTER
Received a call from patient stating that he's been urinating very frequently since starting Mounjaro 7.5mg (switched from Ozempic 2mg->Mounjaro 7.5mg 2 weeks ago). Patient states that he normally urinates frequently due to being on Invokana, but this is a lot worse than his baseline. Been experiencing very bad charley horses for the past 3 days all the way up to his thigh. Has diagnosis of neuropathy and gets charley horses occasionally but not as severe. Patient worried that he is dehydrated and his electrolytes may be off.     Confirmed Diabetes Medications:   Mounjaro 7.5 once a week - patient states \"it works fantastic for his blood sugars\"  Invokana 100mg once a day  Metformin 1000mg twice a day    Denies symptoms of burning with urination and other associated symptoms with urinary tract infection.    Jonathan report downloaded, sent to the provider via [employer].     Routed for the review.

## 2025-07-09 NOTE — TELEPHONE ENCOUNTER
Phoned patient to review bellow. Per patient he doesn't have any low blood sugars, had a bad sensor that had 60 points discrepancy with glucose monitor - replaced sensor, no low blood glucose readings afterwards.   Verbalized understanding.

## 2025-07-09 NOTE — PROGRESS NOTES
CHIEF COMPLAINT:     Chief Complaint   Patient presents with    Urinary Frequency     S/s for 1 week, after change in medications.        HPI:   Zeeshan Yang is a 65 year old male who presents with symptoms of urinary frequency. Complaining of urinary frequency x 1 week. Denies urgency or dysuria. Symptoms have been same since onset.  Treatments tried: none.  Changed from ozempic to mounjaro.   Associated symptoms: none.   Denies flank pain, fever, hematuria, nausea, or vomiting.   Other  hx: no  Hx of pyelonephritis:no  Hx of kidney stone: no  No history of prostate issues.   No change in stream.     Current Medications[1]   Past Medical History[2]   Social History:  Short Social Hx on File[3]      REVIEW OF SYSTEMS:   GENERAL: Denies fever, chills, or body aches; good appetite  SKIN: no rashes, no skin wounds or ulcers.  EYES:denies blurred vision or double vision  HEENT: no congestion, rhinorrhea, sore throat or ear pain  CARDIOVASCULAR: denies chest pain or palpitations  LUNGS: denies shortness of breath, cough, or wheezing  GI: See HPI. No N/V/C/D.   : See HPI.  NEURO: no headaches.    EXAM:   /68   Pulse 87   Temp 97.4 °F (36.3 °C) (Oral)   Resp 16   Wt 181 lb (82.1 kg)   SpO2 98%   BMI 25.24 kg/m²   GENERAL: well developed, well nourished,in no apparent distress  CARDIO: RRR, no murmurs  LUNGS: clear to ausculation bilaterally, no wheezing or rhonchi  GI: BS present x 4.  No hepatosplenomegaly, no tenderness  : no suprapubic tenderness, bladder distention, or CVAT   EXTREMITIES: no edema    Recent Results (from the past 24 hours)   Urine Dip, auto without Micro    Collection Time: 07/09/25  2:22 PM   Result Value Ref Range    Glucose Urine 500 (A) Negative mg/dL    Bilirubin Urine Negative Negative    Ketones, UA Negative Negative - Trace mg/dL    Spec Gravity 1.010 1.005 - 1.030    Blood Urine Negative Negative    PH Urine 6.0 5.0 - 8.0    Protein Urine Negative Negative - Trace mg/dL     Urobilinogen Urine 0.2 0.2 - 1.0 mg/dL    Nitrite Urine Negative Negative    Leukocyte Esterase Urine Negative Negative    APPEARANCE Clear Clear    Color Urine yellow Yellow    Multistix Lot# [id number] Numeric    Multistix Expiration Date 4/30/2029 Date         ASSESSMENT AND PLAN:   Zeeshan Yang is a 65 year old male presents with UTI symptoms.    ASSESSMENT:  Encounter Diagnosis   Name Primary?    Frequent urination Yes     1. Frequent urination  UA negative. Pt on invokana.    - Urine Dip, auto without Micro    PLAN: Meds as listed below. Comfort measures as described in Patient Instructions    Meds & Refills for this Visit:  Requested Prescriptions      No prescriptions requested or ordered in this encounter       Risk and benefits of medication discussed.     There are no Patient Instructions on file for this visit.      The patient indicates understanding of these issues and agrees to the plan.  The patient is asked to follow up with PCP in 3-5 days if not better. Call if fever, vomiting, worsening symptoms.           [1]   Current Outpatient Medications   Medication Sig Dispense Refill    Continuous Glucose Sensor (FREESTYLE MIRTHA 3 PLUS SENSOR) Does not apply Misc 1 each by Other route every 15 (fifteen) days. 6 each 1    Canagliflozin (INVOKANA) 100 MG Oral Tab Take 100 mg by mouth daily. 90 tablet 1    atorvastatin 20 MG Oral Tab Take 1 tablet (20 mg total) by mouth nightly. 90 tablet 1    metFORMIN HCl 1000 MG Oral Tab Take 1 tablet (1,000 mg total) by mouth 2 (two) times daily with meals. 180 tablet 1    Tirzepatide (MOUNJARO) 7.5 MG/0.5ML Subcutaneous Solution Auto-injector Inject 7.5 mg into the skin once a week. 2 mL 1    losartan 50 MG Oral Tab TAKE 1 TABLET BY MOUTH DAILY 90 tablet 2    OMEPRAZOLE 20 MG Oral Capsule Delayed Release TAKE 1 CAPSULE BY MOUTH DAILY 90 capsule 3    hydrocortisone 2.5 % External Cream Apply sparingly as needed up to two times per day 28 g 0    ESCITALOPRAM 10 MG Oral  Tab TAKE 1 TABLET BY MOUTH DAILY 90 tablet 3    Blood Glucose Monitoring Suppl (ONETOUCH VERIO) w/Device Does not apply Kit 1 each daily. 1 kit 0    aspirin 81 MG Oral Tab EC Take 1 tablet (81 mg total) by mouth daily.      albuterol (VENTOLIN HFA) 108 (90 Base) MCG/ACT Inhalation Aero Soln Inhale 2 puffs into the lungs every 4 (four) hours as needed for Wheezing or Shortness of Breath. 1 each 3    Insulin Pen Needle (PEN NEEDLES) 32G X 4 MM Does not apply Misc 1 each by Does not apply route every 7 days. 90 each 0   [2]   Past Medical History:   Acquired hypothyroidism    Adjustment disorder    With mixed depression and anxiety- psychology dr trice mcadams 3.2017    Asthma (HCC)    Back pain    Carotid stenosis    Depression    Diabetes (HCC)    Diastolic dysfunction    Diverticula of colon    of sigmoid colon    Empty sella (HCC)    a 1.2 cm mass is identified within th pituitary gland consistent with empty sella, a benign process    GERD (gastroesophageal reflux disease)    Heartburn    High blood pressure    High cholesterol    Hyperlipidemia    Hypogonadotropic hypogonadism (HCC)    Internal hemorrhoids    Low back pain    recurrent    Mixed hyperlipidemia    Mononucleosis    Murmur, cardiac    BELLA on CPAP    Problems with swallowing    Schatzki's ring    Sleep apnea    Type 2 diabetes mellitus, uncontrolled   [3]   Social History  Socioeconomic History    Marital status:    Occupational History    Occupation: [employer]     Employer: [employer]   Tobacco Use    Smoking status: Never     Passive exposure: Never    Smokeless tobacco: Never   Vaping Use    Vaping status: Never Used   Substance and Sexual Activity    Alcohol use: Not Currently    Drug use: No   Other Topics Concern    Caffeine Concern No    Stress Concern No    Weight Concern No    Special Diet Yes    Exercise No    Seat Belt Yes

## 2025-07-19 DIAGNOSIS — E11.40 TYPE 2 DIABETES MELLITUS WITH DIABETIC NEUROPATHY, WITHOUT LONG-TERM CURRENT USE OF INSULIN (HCC): ICD-10-CM

## 2025-07-21 RX ORDER — TIRZEPATIDE 10 MG/.5ML
10 INJECTION, SOLUTION SUBCUTANEOUS WEEKLY
Qty: 2 ML | Refills: 1 | Status: SHIPPED | OUTPATIENT
Start: 2025-07-21

## 2025-07-21 RX ORDER — TIRZEPATIDE 10 MG/.5ML
10 INJECTION, SOLUTION SUBCUTANEOUS WEEKLY
Qty: 2 ML | Refills: 0 | Status: CANCELLED | OUTPATIENT
Start: 2025-07-21

## 2025-07-21 NOTE — TELEPHONE ENCOUNTER
I refilled mounjaro 10 mg, please let him know to decrease Metformin from 2000 mg per day to 500 mg per day. I sent a new script of Metformin that is 500 mg per tab. He can also cut in half his current tab of Metformin 1000 mg, to get 500 mg per day. Continue Invokana 100 mg daily. To let me know if he gets any glucose below 75 and potentially stop Metformin all together.       Last A1c value was 7.7% done 6/17/2025.  Updated meds:  Diabetic Medications              Tirzepatide (MOUNJARO) 10 MG/0.5ML Subcutaneous Solution Auto-injector (Taking) Inject 10 mg into the skin once a week.    metFORMIN 500 MG Oral Tab (Taking) Take 1 tablet (500 mg total) by mouth daily with breakfast.    Canagliflozin (INVOKANA) 100 MG Oral Tab Take 100 mg by mouth daily.

## 2025-07-21 NOTE — TELEPHONE ENCOUNTER
Received call back from patient- states no side effects with Mounjaro 7.5 mg, would like to make increase to 10 mg weekly dose.    Current medications:   Metformin 1000 mg twice daily  Invokana 100 mg     Report downloaded and routed for review.

## 2025-07-21 NOTE — TELEPHONE ENCOUNTER
Endocrine Refill protocol for oral and injectable diabetic medications    Protocol Criteria:  PASSED  Reason: N/A    If all below requirements are met, send a 90-day supply with 1 refill per provider protocol.    Verify appointment with Endocrinology completed in the last 6 months or scheduled in the next 3 months.  Verify A1C has been completed within the last 6 months and is below 8.5%     Last completed office visit: Visit date not found 6/23/2025  Next scheduled Follow up:   Future Appointments   Date Time Provider Department Center   9/30/2025  8:30 AM Francine Dorantes DO [id number]      Last A1c result: Last A1c value was 7.7% done 6/17/2025.    Telephoned patient to call office back regarding his mounjaro. Wanted an update to see how he was tolerating medication.

## 2025-08-05 DIAGNOSIS — E11.40 TYPE 2 DIABETES MELLITUS WITH DIABETIC NEUROPATHY, WITHOUT LONG-TERM CURRENT USE OF INSULIN (HCC): ICD-10-CM

## 2025-08-06 RX ORDER — TIRZEPATIDE 10 MG/.5ML
10 INJECTION, SOLUTION SUBCUTANEOUS WEEKLY
Qty: 6 ML | Refills: 0 | Status: SHIPPED | OUTPATIENT
Start: 2025-08-06

## 2025-08-22 ENCOUNTER — TELEPHONE (OUTPATIENT)
Facility: CLINIC | Age: 65
End: 2025-08-22

## (undated) DIAGNOSIS — K21.9 GASTROESOPHAGEAL REFLUX DISEASE WITHOUT ESOPHAGITIS: ICD-10-CM

## (undated) DIAGNOSIS — Z12.11 COLON CANCER SCREENING: ICD-10-CM

## (undated) DIAGNOSIS — I10 ESSENTIAL HYPERTENSION: ICD-10-CM

## (undated) DIAGNOSIS — E11.9 TYPE 2 DIABETES MELLITUS WITHOUT COMPLICATION, WITHOUT LONG-TERM CURRENT USE OF INSULIN (HCC): ICD-10-CM

## (undated) NOTE — ED AVS SNAPSHOT
Nicolasa Barbosa   MRN: [id number]    Department:  [facility] in [place]   Date of Visit:  10/5/2019           Disclosure     Insurance plans vary and the physician(s) referred by the ER may not be covered by your plan.  Please contac tell this physician (or your personal doctor if your instructions are to return to your personal doctor) about any new or lasting problems. The primary care or specialist physician will see patients referred from the [facility] Emergency Department.  Wing Hale

## (undated) NOTE — MR AVS SNAPSHOT
[facility] [address]  [facility]  [phone]               Thank you for choosing us for your health care visit with Andrez Foote PA-C.   We are glad to serve you and happy to provide you 7am-3pm plus most Saturday 830am-12p. call [phone] to schedule  •To schedule Imaging or tests at [facility] Scheduling [phone], Go to [facility] A ER Building (For example: CT scans, X rays, Ultrasound, MRI)  •Cardiac Testing in ER building Bu and must be picked up from the office in person. Narcotic medications can only be filled in 30 day increments and must be refilled at an office visit only. If your prescription is due for a refill, you may be due for a follow-up appointment.   We cannot r Generic drug:  Albuterol Sulfate HFA   INHALE 1 PUFF INTO THE LUNGS EVERY 6 HOURS AS NEEDED FOR WHEEZING           * Notice: This list has 2 medication(s) that are the same as other medications prescribed for you.  Read the directions carefully, and ask yo

## (undated) NOTE — Clinical Note
ASTHMA ACTION PLAN for Isak Ratliff     : 3/22/1960     Date: 2017  Provider:  Awais Segovia PA-C  Phone for doctor or clinic: [facility], [address] [place] [address] , [address] [id number] [facility]  [phone]

## (undated) NOTE — LETTER
ASTHMA ACTION PLAN for Elvia Shelby     : 3/22/1960     Date: 2022  Provider:  Allyson Hardin DO  Phone for doctor or clinic: [facility] [facility], [address] [facility] , [address] [place] [id number] [phone]    ACT Score: 25      You can use the colors of a traffic light to help learn about your asthma medicines. 1. Green - Go! % of Personal Best Peak Flow Use controller medicine. Breathing is good  No cough or wheeze  Can work and play Medicine How much to take When to take it    No medications needed because of good control. 2. Yellow - Caution. 50-79% Personal Best Peak  Flow. Use reliever medicine to keep an asthma attack from getting bad. Cough  Wheezing  Tight Chest  Wake up at night Medicine How much to take When to take it    Ventolin HFA (albuterol) inhaler 1-2 puffs every 4-6 hours as needed       Additional instructions         3. Red - Stop! Danger!  <50% Personal Best Peak  Flow. Take these medications until  Get help from a doctor   Medicine not helping  Breathing is hard and fast  Nose opens wide  Can't walk  Ribs show  Can't talk well Medicine How much to take When to take it    Go to the nearest Emergency Room/Department right now! Additional Instructions If your symptoms do not improve and you cannot contact your doctor, [facility] or call 911 immediately! [x] Asthma Action Plan reviewed with patient (and caregiver if necessary) and a copy of the plan was given to the patient/caregiver. [] Asthma Action Plan reviewed with patient (and caregiver if necessary) on the phone and mailed copy to patient or submitted via [address]      Signatures:  Provider  Allyson Hardin DO   Patient Caretaker

## (undated) NOTE — LETTER
Date: 6/17/2020    Patient Name: Rosi Rogers          To Whom it may concern: This letter has been written at the patient's request. The above patient was seen at the [facility] for treatment of a medical condition.     This patient jacquelin

## (undated) NOTE — LETTER
Date: 2/17/2021    Patient Name: Keyanna Huang          To Whom it may concern: This letter has been written at the patient's request. The above patient was seen at the [facility] for treatment of a medical condition.     This patient jacquelin

## (undated) NOTE — LETTER
Date & Time: 3/20/2025, 9:23 AM  Patient: Zeeshan Yang  Encounter Provider(s):    Reyes Syed MD       To Whom It May Concern:    Zeeshan Yang was seen and treated in our department on 3/20/2025. He can return to work tonight 3/20/2025.    If you have any questions or concerns, please do not hesitate to call.        _____________________________  Physician/APC Signature

## (undated) NOTE — LETTER
Date: 6/9/2020    Patient Name: Richelle Johnson          To Whom it may concern: This letter has been written at the patient's request. The above patient was seen at the [facility] for treatment of a medical condition.     The patient may r

## (undated) NOTE — LETTER
Date: 5/14/2020    Patient Name: Kwan Jordan          To Whom it may concern: This letter has been written at the patient's request. The above patient was seen at the [facility] for treatment of a medical condition.     This patient jacquelin

## (undated) NOTE — LETTER
ASTHMA ACTION PLAN for Martínez Duncan     : 3/22/1960     Date: 2019  Provider:  Dominga Gupta DO  Phone for doctor or clinic: [facility], [address] [facility] [address]  [phone]

## (undated) NOTE — LETTER
ASTHMA ACTION PLAN for Zeeshan Yang     : 3/22/1960     Date: 2024  Provider:  Alen Henson DO  Phone for doctor or clinic: [facility], [address] [place] [address] [address]  [facility] [address] [phone]    ACT Score: 25      You can use the colors of a traffic light to help learn about your asthma medicines.      1. Green - Go! % of Personal Best Peak Flow Use controller medicine.   Breathing is good  No cough or wheeze  Can work and play Medicine How much to take When to take it          2. Yellow - Caution. 50-79% Personal Best Peak  Flow.  Use reliever medicine to keep an asthma attack from getting bad.   Cough  Wheezing  Tight Chest  Wake up at night Medicine How much to take When to take it    Albuterol inhaler,  2 puffs every four hours as needed.        Additional instructions         3. Red - Stop! Danger!  <50% Personal Best Peak  Flow. Take these medications until  Get help from a doctor   Medicine not helping  Breathing is hard and fast  Nose opens wide  Can't walk  Ribs show  Can't talk well Medicine How much to take When to take it    Albuterol inhaler,  2 puffs every four hours as needed.       Additional Instructions If your symptoms do not improve and you cannot contact your doctor, [facility] or call 911 immediately!     [x] Asthma Action Plan reviewed with patient (and caregiver if necessary) and a copy of the plan was given to the patient/caregiver.   [] Asthma Action Plan reviewed with patient (and caregiver if necessary) on the phone and mailed copy to patient or submitted via [email or web address].     Signatures:  Provider  Alen Henson DO   Patient Caretaker

## (undated) NOTE — MR AVS SNAPSHOT
After Visit Summary   12/5/2022    Severiano Merino   MRN: [id number]           Visit Information     Date & Time  12/5/2022  8:45 AM Provider  [facility] Phone  [phone]      Allergies as of 12/5/2022  Review status set to Review Complete on 10/24/2022       Noted Reaction Type Reactions    Dust Mites 07/06/2020    ASTHMA    Pollen 06/01/2020    OTHER (SEE COMMENTS)    Ragweed 06/01/2020    OTHER (SEE COMMENTS)    Tree Extract 06/01/2020    OTHER (SEE COMMENTS)      Your Current Medications        Dosage    MELOXICAM 15 MG Oral Tab TAKE 1 TABLET BY MOUTH  DAILY AS NEEDED FOR PAIN    semaglutide (OZEMPIC, 1 MG/DOSE,) 4 MG/3ML Subcutaneous Solution Pen-injector INJECT SUBCUTANEOUSLY 1MG  ONCE WEEKLY    Canagliflozin (INVOKANA) 100 MG Oral Tab Take 100 mg by mouth daily. metFORMIN HCl 1000 MG Oral Tab Take 1 tablet (1,000 mg total) by mouth 2 (two) times daily with meals. omeprazole 20 MG Oral Capsule Delayed Release Take 1 capsule (20 mg total) by mouth daily. escitalopram 10 MG Oral Tab Take 1 tablet (10 mg total) by mouth daily. losartan 50 MG Oral Tab Take 1 tablet (50 mg total) by mouth daily. valACYclovir 1 G Oral Tab Take 2 tablets at first sign of cold sore then repeat in 12 hours, then stop. albuterol (VENTOLIN HFA) 108 (90 Base) MCG/ACT Inhalation Aero Soln Inhale 2 puffs into the lungs every 4 (four) hours as needed for Wheezing or Shortness of Breath. atorvastatin 40 MG Oral Tab Take 1 tablet (40 mg total) by mouth nightly. EPINEPHrine (EPIPEN 2-SELVIN) 0.3 MG/0.3ML Injection Solution Auto-injector Take as directed--brand or generic ok    Fluticasone Propionate 50 MCG/ACT Nasal Suspension 2 sprays by Each Nare route daily. Insulin Pen Needle (PEN NEEDLES) 32G X 4 MM Does not apply Misc 1 each by Does not apply route every 7 days. Aspirin 81 MG Oral Tab Take 81 mg by mouth daily.       Diagnoses for This Visit Neuropathy   [140310]             Future Appointments        Provider Department    12/27/2022 10:00 AM [facility] [address] [address] [place]    4/4/2023 9:45 AM [facility]    4/17/2023 2:00 PM Rosa Thompson 26                Did you know that [facility] primary care physicians now offer Video Visits through [address] for adult patients for a variety of conditions such as allergies, back pain and cold symptoms? Skip the drive and waiting room and online chat with a doctor face-to-face using your web-cam enabled computer or mobile device wherever you are. Video Visits cost $50 and can be paid hassle-free using a credit, debit, or health savings card. Not active on [employer]? Ask us how to get signed up today! If you receive a survey from [employer], please take a few minutes to complete it and provide feedback. We strive to deliver the best patient experience and are looking for ways to make improvements. Your feedback will help us do so. For more information on [employer], please visit [email or web address]           No text in SmartText           No text in SmartText

## (undated) NOTE — Clinical Note
Date: 4/8/2017    Patient Name: Kailash Rob          To Whom it may concern: This letter has been written at the patient's request. The above patient was seen at the [facility] for treatment of a medical condition.     This patient jacquelinu

## (undated) NOTE — LETTER
ASTHMA ACTION PLAN for Rosi Rogers     : 3/22/1960     Date: 2018  Provider:  Radha Hager MD  Phone for doctor or clinic: [facility], [address]  [address] [address]  [facility] [address] [phone]    ACT Score: 22      Y Rowan Dickson MD   Patient Caretaker

## (undated) NOTE — LETTER
ASTHMA ACTION PLAN for Leila Sheth     : 3/22/1960     Date: 2020  Provider:  Abhijeet Mesa DO  Phone for doctor or clinic: [address] [facility], [address] [place] [address] , [address] [facility]  [phone]

## (undated) NOTE — MR AVS SNAPSHOT
[facility] [address]  [facility]  [phone]               Thank you for choosing us for your health care visit with Janene Singh PA-C.   We are glad to serve you and happy to provide you •To schedule Imaging or tests at [facility] Scheduling [phone], Go to [facility] A ER Building (For example: CT scans, X rays, Ultrasound, MRI)  •Cardiac Testing in ER building Building A second floor Cardiac Testing [phone] (For example:  [employer] Narcotic medications can only be filled in 30 day increments and must be refilled at an office visit only. If your prescription is due for a refill, you may be due for a follow-up appointment.   We cannot refill your maintenance medications at a preventati INHALE 1 PUFF INTO THE LUNGS EVERY 6 HOURS AS NEEDED FOR WHEEZING                Where to Get Your Medications      These medications were sent to [address] [place], [place] [address] - [address] [facility]  [address], [facility]

## (undated) NOTE — Clinical Note
Just an FYI, things are looking really good.  Full report in your folder if you want to review further.  No changes made today